# Patient Record
Sex: MALE | Race: WHITE | Employment: OTHER | URBAN - METROPOLITAN AREA
[De-identification: names, ages, dates, MRNs, and addresses within clinical notes are randomized per-mention and may not be internally consistent; named-entity substitution may affect disease eponyms.]

---

## 2017-01-10 PROBLEM — R73.03 PREDIABETES: Status: ACTIVE | Noted: 2017-01-10

## 2018-06-05 ENCOUNTER — OFFICE VISIT (OUTPATIENT)
Dept: FAMILY MEDICINE CLINIC | Age: 63
End: 2018-06-05
Payer: COMMERCIAL

## 2018-06-05 VITALS
WEIGHT: 223 LBS | DIASTOLIC BLOOD PRESSURE: 82 MMHG | SYSTOLIC BLOOD PRESSURE: 138 MMHG | OXYGEN SATURATION: 95 % | BODY MASS INDEX: 28.62 KG/M2 | TEMPERATURE: 98.5 F | HEART RATE: 105 BPM | HEIGHT: 74 IN | RESPIRATION RATE: 18 BRPM

## 2018-06-05 DIAGNOSIS — J32.9 SINOBRONCHITIS: Primary | ICD-10-CM

## 2018-06-05 DIAGNOSIS — J40 SINOBRONCHITIS: Primary | ICD-10-CM

## 2018-06-05 PROCEDURE — 99213 OFFICE O/P EST LOW 20 MIN: CPT | Performed by: PHYSICIAN ASSISTANT

## 2018-06-05 RX ORDER — LEVOFLOXACIN 500 MG/1
500 TABLET, FILM COATED ORAL DAILY
Qty: 10 TABLET | Refills: 0 | Status: SHIPPED | OUTPATIENT
Start: 2018-06-05 | End: 2018-06-15

## 2018-06-08 ENCOUNTER — HOSPITAL ENCOUNTER (EMERGENCY)
Age: 63
Discharge: HOME | End: 2018-06-08
Payer: COMMERCIAL

## 2018-06-08 ENCOUNTER — APPOINTMENT (OUTPATIENT)
Dept: ULTRASOUND IMAGING | Age: 63
End: 2018-06-08
Payer: COMMERCIAL

## 2018-06-08 VITALS
WEIGHT: 223 LBS | SYSTOLIC BLOOD PRESSURE: 112 MMHG | HEART RATE: 101 BPM | TEMPERATURE: 97.4 F | OXYGEN SATURATION: 97 % | DIASTOLIC BLOOD PRESSURE: 78 MMHG | RESPIRATION RATE: 16 BRPM | BODY MASS INDEX: 28.63 KG/M2

## 2018-06-08 DIAGNOSIS — I80.9 PHLEBITIS: ICD-10-CM

## 2018-06-08 DIAGNOSIS — I83.92 VARICOSE VEINS OF LEFT LOWER EXTREMITY: Primary | ICD-10-CM

## 2018-06-08 LAB
ANION GAP SERPL CALCULATED.3IONS-SCNC: 13 MMOL/L (ref 7–16)
BASOPHILS ABSOLUTE: 0.14 E9/L (ref 0–0.2)
BASOPHILS RELATIVE PERCENT: 1.2 % (ref 0–2)
BUN BLDV-MCNC: 22 MG/DL (ref 8–23)
CALCIUM SERPL-MCNC: 10.3 MG/DL (ref 8.6–10.2)
CHLORIDE BLD-SCNC: 104 MMOL/L (ref 98–107)
CO2: 26 MMOL/L (ref 22–29)
CREAT SERPL-MCNC: 0.9 MG/DL (ref 0.7–1.2)
EOSINOPHILS ABSOLUTE: 0.4 E9/L (ref 0.05–0.5)
EOSINOPHILS RELATIVE PERCENT: 3.6 % (ref 0–6)
GFR AFRICAN AMERICAN: >60
GFR NON-AFRICAN AMERICAN: >60 ML/MIN/1.73
GLUCOSE BLD-MCNC: 104 MG/DL (ref 74–109)
HCT VFR BLD CALC: 45.3 % (ref 37–54)
HEMOGLOBIN: 15.1 G/DL (ref 12.5–16.5)
IMMATURE GRANULOCYTES #: 0.09 E9/L
IMMATURE GRANULOCYTES %: 0.8 % (ref 0–5)
LYMPHOCYTES ABSOLUTE: 1.18 E9/L (ref 1.5–4)
LYMPHOCYTES RELATIVE PERCENT: 10.5 % (ref 20–42)
MCH RBC QN AUTO: 30.8 PG (ref 26–35)
MCHC RBC AUTO-ENTMCNC: 33.3 % (ref 32–34.5)
MCV RBC AUTO: 92.3 FL (ref 80–99.9)
MONOCYTES ABSOLUTE: 1.13 E9/L (ref 0.1–0.95)
MONOCYTES RELATIVE PERCENT: 10.1 % (ref 2–12)
NEUTROPHILS ABSOLUTE: 8.28 E9/L (ref 1.8–7.3)
NEUTROPHILS RELATIVE PERCENT: 73.8 % (ref 43–80)
PDW BLD-RTO: 12.2 FL (ref 11.5–15)
PLATELET # BLD: 624 E9/L (ref 130–450)
PMV BLD AUTO: 10 FL (ref 7–12)
POTASSIUM SERPL-SCNC: 4.3 MMOL/L (ref 3.5–5)
RBC # BLD: 4.91 E12/L (ref 3.8–5.8)
SODIUM BLD-SCNC: 143 MMOL/L (ref 132–146)
WBC # BLD: 11.2 E9/L (ref 4.5–11.5)

## 2018-06-08 PROCEDURE — 36415 COLL VENOUS BLD VENIPUNCTURE: CPT

## 2018-06-08 PROCEDURE — 80048 BASIC METABOLIC PNL TOTAL CA: CPT

## 2018-06-08 PROCEDURE — 93971 EXTREMITY STUDY: CPT

## 2018-06-08 PROCEDURE — 85025 COMPLETE CBC W/AUTO DIFF WBC: CPT

## 2018-06-08 PROCEDURE — 99212 OFFICE O/P EST SF 10 MIN: CPT

## 2018-08-24 ENCOUNTER — HOSPITAL ENCOUNTER (OUTPATIENT)
Dept: ULTRASOUND IMAGING | Age: 63
Discharge: HOME | End: 2018-08-26
Payer: COMMERCIAL

## 2018-08-24 DIAGNOSIS — M79.89 LEFT LEG SWELLING: ICD-10-CM

## 2018-08-24 PROCEDURE — 93971 EXTREMITY STUDY: CPT

## 2020-01-01 ENCOUNTER — APPOINTMENT (OUTPATIENT)
Dept: GENERAL RADIOLOGY | Age: 65
End: 2020-01-01
Payer: COMMERCIAL

## 2020-01-01 ENCOUNTER — APPOINTMENT (OUTPATIENT)
Dept: CT IMAGING | Age: 65
DRG: 023 | End: 2020-01-01
Payer: COMMERCIAL

## 2020-01-01 ENCOUNTER — HOSPITAL ENCOUNTER (INPATIENT)
Age: 65
LOS: 7 days | Discharge: HOSPICE | DRG: 871 | End: 2020-12-29
Attending: EMERGENCY MEDICINE | Admitting: INTERNAL MEDICINE
Payer: COMMERCIAL

## 2020-01-01 ENCOUNTER — HOSPITAL ENCOUNTER (EMERGENCY)
Age: 65
Discharge: HOME | End: 2020-01-02
Payer: COMMERCIAL

## 2020-01-01 ENCOUNTER — APPOINTMENT (OUTPATIENT)
Dept: GENERAL RADIOLOGY | Age: 65
DRG: 023 | End: 2020-01-01
Payer: COMMERCIAL

## 2020-01-01 ENCOUNTER — ANESTHESIA EVENT (OUTPATIENT)
Dept: INTERVENTIONAL RADIOLOGY/VASCULAR | Age: 65
DRG: 023 | End: 2020-01-01
Payer: COMMERCIAL

## 2020-01-01 ENCOUNTER — ANESTHESIA (OUTPATIENT)
Dept: INTERVENTIONAL RADIOLOGY/VASCULAR | Age: 65
DRG: 023 | End: 2020-01-01
Payer: COMMERCIAL

## 2020-01-01 ENCOUNTER — APPOINTMENT (OUTPATIENT)
Dept: CT IMAGING | Age: 65
End: 2020-01-01
Payer: COMMERCIAL

## 2020-01-01 ENCOUNTER — APPOINTMENT (OUTPATIENT)
Dept: CT IMAGING | Age: 65
DRG: 871 | End: 2020-01-01
Payer: COMMERCIAL

## 2020-01-01 ENCOUNTER — HOSPITAL ENCOUNTER (OUTPATIENT)
Age: 65
Setting detail: OBSERVATION
Discharge: OTHER INSTITUTION | End: 2020-12-03
Attending: EMERGENCY MEDICINE | Admitting: INTERNAL MEDICINE
Payer: COMMERCIAL

## 2020-01-01 ENCOUNTER — APPOINTMENT (OUTPATIENT)
Dept: INTERVENTIONAL RADIOLOGY/VASCULAR | Age: 65
DRG: 023 | End: 2020-01-01
Payer: COMMERCIAL

## 2020-01-01 ENCOUNTER — APPOINTMENT (OUTPATIENT)
Dept: MRI IMAGING | Age: 65
DRG: 023 | End: 2020-01-01
Payer: COMMERCIAL

## 2020-01-01 ENCOUNTER — HOSPITAL ENCOUNTER (EMERGENCY)
Age: 65
Discharge: NURSING, REHAB OR LONG-TERM CARE | End: 2020-11-27
Attending: EMERGENCY MEDICINE
Payer: COMMERCIAL

## 2020-01-01 ENCOUNTER — APPOINTMENT (OUTPATIENT)
Dept: GENERAL RADIOLOGY | Age: 65
DRG: 871 | End: 2020-01-01
Payer: COMMERCIAL

## 2020-01-01 ENCOUNTER — HOSPITAL ENCOUNTER (INPATIENT)
Age: 65
LOS: 11 days | Discharge: ANOTHER HOSPITAL | DRG: 023 | End: 2020-11-03
Attending: EMERGENCY MEDICINE | Admitting: INTERNAL MEDICINE
Payer: COMMERCIAL

## 2020-01-01 ENCOUNTER — HOSPITAL ENCOUNTER (OUTPATIENT)
Age: 65
Discharge: HOME | End: 2020-06-04
Payer: COMMERCIAL

## 2020-01-01 VITALS
BODY MASS INDEX: 29.15 KG/M2 | RESPIRATION RATE: 18 BRPM | WEIGHT: 227 LBS | OXYGEN SATURATION: 94 % | DIASTOLIC BLOOD PRESSURE: 86 MMHG | HEART RATE: 95 BPM | TEMPERATURE: 98.3 F | SYSTOLIC BLOOD PRESSURE: 151 MMHG

## 2020-01-01 VITALS
WEIGHT: 184.75 LBS | TEMPERATURE: 97.9 F | HEART RATE: 102 BPM | OXYGEN SATURATION: 97 % | SYSTOLIC BLOOD PRESSURE: 125 MMHG | DIASTOLIC BLOOD PRESSURE: 83 MMHG | BODY MASS INDEX: 23.71 KG/M2 | RESPIRATION RATE: 17 BRPM | HEIGHT: 74 IN

## 2020-01-01 VITALS
HEIGHT: 74 IN | TEMPERATURE: 98 F | WEIGHT: 185 LBS | RESPIRATION RATE: 41 BRPM | BODY MASS INDEX: 23.74 KG/M2 | OXYGEN SATURATION: 93 % | SYSTOLIC BLOOD PRESSURE: 74 MMHG | HEART RATE: 129 BPM | DIASTOLIC BLOOD PRESSURE: 45 MMHG

## 2020-01-01 VITALS
SYSTOLIC BLOOD PRESSURE: 110 MMHG | HEART RATE: 85 BPM | HEIGHT: 74 IN | TEMPERATURE: 97.7 F | DIASTOLIC BLOOD PRESSURE: 80 MMHG | BODY MASS INDEX: 26.56 KG/M2 | OXYGEN SATURATION: 96 % | RESPIRATION RATE: 16 BRPM | WEIGHT: 207 LBS

## 2020-01-01 VITALS
SYSTOLIC BLOOD PRESSURE: 128 MMHG | BODY MASS INDEX: 26.56 KG/M2 | WEIGHT: 207 LBS | OXYGEN SATURATION: 95 % | RESPIRATION RATE: 16 BRPM | TEMPERATURE: 97.8 F | HEIGHT: 74 IN | DIASTOLIC BLOOD PRESSURE: 81 MMHG | HEART RATE: 88 BPM

## 2020-01-01 VITALS — SYSTOLIC BLOOD PRESSURE: 170 MMHG | DIASTOLIC BLOOD PRESSURE: 95 MMHG | OXYGEN SATURATION: 100 %

## 2020-01-01 DIAGNOSIS — I63.9 ACUTE ISCHEMIC STROKE (HCC): Primary | ICD-10-CM

## 2020-01-01 LAB
AADO2: 576.1 MMHG
ADENOVIRUS BY PCR: NOT DETECTED
ADENOVIRUS BY PCR: NOT DETECTED
ALBUMIN SERPL-MCNC: 2.4 G/DL (ref 3.5–5.2)
ALBUMIN SERPL-MCNC: 2.4 G/DL (ref 3.5–5.2)
ALBUMIN SERPL-MCNC: 2.6 G/DL (ref 3.5–5.2)
ALBUMIN SERPL-MCNC: 2.8 G/DL (ref 3.5–5.2)
ALBUMIN SERPL-MCNC: 2.9 G/DL (ref 3.5–5.2)
ALBUMIN SERPL-MCNC: 3 G/DL (ref 3.5–5.2)
ALBUMIN SERPL-MCNC: 4.3 G/DL (ref 3.5–5.2)
ALBUMIN SERPL-MCNC: 4.6 G/DL (ref 3.5–5.2)
ALP BLD-CCNC: 218 U/L (ref 40–129)
ALP BLD-CCNC: 232 U/L (ref 40–129)
ALP BLD-CCNC: 244 U/L (ref 40–129)
ALP BLD-CCNC: 290 U/L (ref 40–129)
ALP BLD-CCNC: 332 U/L (ref 40–129)
ALP BLD-CCNC: 340 U/L (ref 40–129)
ALP BLD-CCNC: 76 U/L (ref 40–129)
ALP BLD-CCNC: 79 U/L (ref 40–129)
ALT SERPL-CCNC: 102 U/L (ref 0–40)
ALT SERPL-CCNC: 162 U/L (ref 0–40)
ALT SERPL-CCNC: 22 U/L (ref 0–40)
ALT SERPL-CCNC: 33 U/L (ref 0–40)
ALT SERPL-CCNC: 65 U/L (ref 0–40)
ALT SERPL-CCNC: 70 U/L (ref 0–40)
ALT SERPL-CCNC: 94 U/L (ref 0–40)
ALT SERPL-CCNC: 95 U/L (ref 0–40)
AMORPHOUS: ABNORMAL
ANION GAP SERPL CALCULATED.3IONS-SCNC: 11 MMOL/L (ref 7–16)
ANION GAP SERPL CALCULATED.3IONS-SCNC: 11 MMOL/L (ref 7–16)
ANION GAP SERPL CALCULATED.3IONS-SCNC: 12 MMOL/L (ref 7–16)
ANION GAP SERPL CALCULATED.3IONS-SCNC: 13 MMOL/L (ref 7–16)
ANION GAP SERPL CALCULATED.3IONS-SCNC: 14 MMOL/L (ref 7–16)
ANION GAP SERPL CALCULATED.3IONS-SCNC: 15 MMOL/L (ref 7–16)
ANION GAP SERPL CALCULATED.3IONS-SCNC: 15 MMOL/L (ref 7–16)
ANION GAP SERPL CALCULATED.3IONS-SCNC: 16 MMOL/L (ref 7–16)
ANION GAP SERPL CALCULATED.3IONS-SCNC: 17 MMOL/L (ref 7–16)
ANION GAP SERPL CALCULATED.3IONS-SCNC: 17 MMOL/L (ref 7–16)
ANION GAP SERPL CALCULATED.3IONS-SCNC: 18 MMOL/L (ref 7–16)
ANION GAP SERPL CALCULATED.3IONS-SCNC: 18 MMOL/L (ref 7–16)
ANION GAP SERPL CALCULATED.3IONS-SCNC: 23 MMOL/L (ref 7–16)
ANION GAP SERPL CALCULATED.3IONS-SCNC: 9 MMOL/L (ref 7–16)
ANISOCYTOSIS: ABNORMAL
APTT: 26.3 SEC (ref 24.5–35.1)
APTT: 28.2 SEC (ref 24.5–35.1)
AST SERPL-CCNC: 26 U/L (ref 0–39)
AST SERPL-CCNC: 29 U/L (ref 0–39)
AST SERPL-CCNC: 38 U/L (ref 0–39)
AST SERPL-CCNC: 43 U/L (ref 0–39)
AST SERPL-CCNC: 46 U/L (ref 0–39)
AST SERPL-CCNC: 51 U/L (ref 0–39)
AST SERPL-CCNC: 67 U/L (ref 0–39)
AST SERPL-CCNC: 81 U/L (ref 0–39)
B.E.: -3.3 MMOL/L (ref -3–3)
B.E.: 0.2 MMOL/L (ref -3–3)
BACTERIA: ABNORMAL /HPF
BASOPHILS ABSOLUTE: 0 E9/L (ref 0–0.2)
BASOPHILS ABSOLUTE: 0.05 E9/L (ref 0–0.2)
BASOPHILS ABSOLUTE: 0.08 E9/L (ref 0–0.2)
BASOPHILS ABSOLUTE: 0.09 E9/L (ref 0–0.2)
BASOPHILS ABSOLUTE: 0.09 E9/L (ref 0–0.2)
BASOPHILS ABSOLUTE: 0.15 E9/L (ref 0–0.2)
BASOPHILS RELATIVE PERCENT: 0.1 % (ref 0–2)
BASOPHILS RELATIVE PERCENT: 0.2 % (ref 0–2)
BASOPHILS RELATIVE PERCENT: 0.2 % (ref 0–2)
BASOPHILS RELATIVE PERCENT: 0.3 % (ref 0–2)
BASOPHILS RELATIVE PERCENT: 0.4 % (ref 0–2)
BASOPHILS RELATIVE PERCENT: 0.9 % (ref 0–2)
BILIRUB SERPL-MCNC: 0.5 MG/DL (ref 0–1.2)
BILIRUB SERPL-MCNC: 0.7 MG/DL (ref 0–1.2)
BILIRUB SERPL-MCNC: 0.8 MG/DL (ref 0–1.2)
BILIRUB SERPL-MCNC: 0.8 MG/DL (ref 0–1.2)
BILIRUB SERPL-MCNC: 1 MG/DL (ref 0–1.2)
BILIRUB SERPL-MCNC: 1.8 MG/DL (ref 0–1.2)
BILIRUBIN URINE: ABNORMAL
BILIRUBIN URINE: NEGATIVE
BLOOD CULTURE, ROUTINE: NORMAL
BLOOD CULTURE, ROUTINE: NORMAL
BLOOD, URINE: ABNORMAL
BLOOD, URINE: ABNORMAL
BLOOD, URINE: NEGATIVE
BLOOD, URINE: NEGATIVE
BORDETELLA PARAPERTUSSIS BY PCR: NOT DETECTED
BORDETELLA PARAPERTUSSIS BY PCR: NOT DETECTED
BORDETELLA PERTUSSIS BY PCR: NOT DETECTED
BORDETELLA PERTUSSIS BY PCR: NOT DETECTED
BUN BLDV-MCNC: 112 MG/DL (ref 8–23)
BUN BLDV-MCNC: 13 MG/DL (ref 8–23)
BUN BLDV-MCNC: 13 MG/DL (ref 8–23)
BUN BLDV-MCNC: 14 MG/DL (ref 8–23)
BUN BLDV-MCNC: 14 MG/DL (ref 8–23)
BUN BLDV-MCNC: 141 MG/DL (ref 8–23)
BUN BLDV-MCNC: 143 MG/DL (ref 8–23)
BUN BLDV-MCNC: 148 MG/DL (ref 8–23)
BUN BLDV-MCNC: 149 MG/DL (ref 8–23)
BUN BLDV-MCNC: 157 MG/DL (ref 8–23)
BUN BLDV-MCNC: 16 MG/DL (ref 8–23)
BUN BLDV-MCNC: 162 MG/DL (ref 8–23)
BUN BLDV-MCNC: 164 MG/DL (ref 8–23)
BUN BLDV-MCNC: 17 MG/DL (ref 8–23)
BUN BLDV-MCNC: 170 MG/DL (ref 8–23)
BUN BLDV-MCNC: 18 MG/DL (ref 8–23)
BUN BLDV-MCNC: 18 MG/DL (ref 8–23)
BUN BLDV-MCNC: 20 MG/DL (ref 8–23)
BUN BLDV-MCNC: 32 MG/DL (ref 8–23)
BUN BLDV-MCNC: 33 MG/DL (ref 8–23)
BUN BLDV-MCNC: 37 MG/DL (ref 8–23)
BUN BLDV-MCNC: 45 MG/DL (ref 8–23)
BUN BLDV-MCNC: 51 MG/DL (ref 8–23)
BUN BLDV-MCNC: 80 MG/DL (ref 8–23)
BUN BLDV-MCNC: 88 MG/DL (ref 8–23)
BURR CELLS: ABNORMAL
C-REACTIVE PROTEIN: 11.7 MG/DL (ref 0–0.4)
CALCIUM SERPL-MCNC: 10.3 MG/DL (ref 8.6–10.2)
CALCIUM SERPL-MCNC: 8.3 MG/DL (ref 8.6–10.2)
CALCIUM SERPL-MCNC: 8.6 MG/DL (ref 8.6–10.2)
CALCIUM SERPL-MCNC: 8.7 MG/DL (ref 8.6–10.2)
CALCIUM SERPL-MCNC: 8.8 MG/DL (ref 8.6–10.2)
CALCIUM SERPL-MCNC: 8.9 MG/DL (ref 8.6–10.2)
CALCIUM SERPL-MCNC: 8.9 MG/DL (ref 8.6–10.2)
CALCIUM SERPL-MCNC: 9 MG/DL (ref 8.6–10.2)
CALCIUM SERPL-MCNC: 9 MG/DL (ref 8.6–10.2)
CALCIUM SERPL-MCNC: 9.1 MG/DL (ref 8.6–10.2)
CALCIUM SERPL-MCNC: 9.1 MG/DL (ref 8.6–10.2)
CALCIUM SERPL-MCNC: 9.2 MG/DL (ref 8.6–10.2)
CALCIUM SERPL-MCNC: 9.4 MG/DL (ref 8.6–10.2)
CALCIUM SERPL-MCNC: 9.4 MG/DL (ref 8.6–10.2)
CALCIUM SERPL-MCNC: 9.5 MG/DL (ref 8.6–10.2)
CALCIUM SERPL-MCNC: 9.6 MG/DL (ref 8.6–10.2)
CALCIUM SERPL-MCNC: 9.6 MG/DL (ref 8.6–10.2)
CALCIUM SERPL-MCNC: 9.7 MG/DL (ref 8.6–10.2)
CALCIUM SERPL-MCNC: 9.8 MG/DL (ref 8.6–10.2)
CALCIUM SERPL-MCNC: 9.9 MG/DL (ref 8.6–10.2)
CALCIUM SERPL-MCNC: 9.9 MG/DL (ref 8.6–10.2)
CHLAMYDOPHILIA PNEUMONIAE BY PCR: NOT DETECTED
CHLAMYDOPHILIA PNEUMONIAE BY PCR: NOT DETECTED
CHLORIDE BLD-SCNC: 103 MMOL/L (ref 98–107)
CHLORIDE BLD-SCNC: 104 MMOL/L (ref 98–107)
CHLORIDE BLD-SCNC: 105 MMOL/L (ref 98–107)
CHLORIDE BLD-SCNC: 110 MMOL/L (ref 98–107)
CHLORIDE BLD-SCNC: 112 MMOL/L (ref 98–107)
CHLORIDE BLD-SCNC: 113 MMOL/L (ref 98–107)
CHLORIDE BLD-SCNC: 114 MMOL/L (ref 98–107)
CHLORIDE BLD-SCNC: 115 MMOL/L (ref 98–107)
CHLORIDE BLD-SCNC: 116 MMOL/L (ref 98–107)
CHLORIDE BLD-SCNC: 116 MMOL/L (ref 98–107)
CHLORIDE BLD-SCNC: 117 MMOL/L (ref 98–107)
CHLORIDE BLD-SCNC: 117 MMOL/L (ref 98–107)
CHLORIDE BLD-SCNC: 118 MMOL/L (ref 98–107)
CHLORIDE BLD-SCNC: 118 MMOL/L (ref 98–107)
CHLORIDE BLD-SCNC: 119 MMOL/L (ref 98–107)
CHLORIDE BLD-SCNC: 97 MMOL/L (ref 98–107)
CHLORIDE BLD-SCNC: 99 MMOL/L (ref 98–107)
CHLORIDE URINE RANDOM: <20 MMOL/L
CHOLESTEROL, TOTAL: 159 MG/DL (ref 0–199)
CHOLESTEROL, TOTAL: 253 MG/DL (ref 0–199)
CHP ED QC CHECK: YES
CLARITY: CLEAR
CO2: 20 MMOL/L (ref 22–29)
CO2: 21 MMOL/L (ref 22–29)
CO2: 21 MMOL/L (ref 22–29)
CO2: 22 MMOL/L (ref 22–29)
CO2: 23 MMOL/L (ref 22–29)
CO2: 23 MMOL/L (ref 22–29)
CO2: 24 MMOL/L (ref 22–29)
CO2: 25 MMOL/L (ref 22–29)
CO2: 26 MMOL/L (ref 22–29)
CO2: 26 MMOL/L (ref 22–29)
CO2: 27 MMOL/L (ref 22–29)
CO2: 27 MMOL/L (ref 22–29)
CO2: 28 MMOL/L (ref 22–29)
COHB: 0.1 % (ref 0–1.5)
COHB: 0.3 % (ref 0–1.5)
COLOR: ABNORMAL
COLOR: YELLOW
CORONAVIRUS 229E BY PCR: NOT DETECTED
CORONAVIRUS 229E BY PCR: NOT DETECTED
CORONAVIRUS HKU1 BY PCR: NOT DETECTED
CORONAVIRUS HKU1 BY PCR: NOT DETECTED
CORONAVIRUS NL63 BY PCR: NOT DETECTED
CORONAVIRUS NL63 BY PCR: NOT DETECTED
CORONAVIRUS OC43 BY PCR: NOT DETECTED
CORONAVIRUS OC43 BY PCR: NOT DETECTED
CREAT SERPL-MCNC: 0.6 MG/DL (ref 0.7–1.2)
CREAT SERPL-MCNC: 0.7 MG/DL (ref 0.7–1.2)
CREAT SERPL-MCNC: 0.8 MG/DL (ref 0.7–1.2)
CREAT SERPL-MCNC: 0.9 MG/DL (ref 0.7–1.2)
CREAT SERPL-MCNC: 1.3 MG/DL (ref 0.7–1.2)
CREAT SERPL-MCNC: 1.3 MG/DL (ref 0.7–1.2)
CREAT SERPL-MCNC: 1.4 MG/DL (ref 0.7–1.2)
CREAT SERPL-MCNC: 2.4 MG/DL (ref 0.7–1.2)
CREAT SERPL-MCNC: 3.5 MG/DL (ref 0.7–1.2)
CREAT SERPL-MCNC: 4.1 MG/DL (ref 0.7–1.2)
CREAT SERPL-MCNC: 4.9 MG/DL (ref 0.7–1.2)
CREAT SERPL-MCNC: 4.9 MG/DL (ref 0.7–1.2)
CREAT SERPL-MCNC: 5.3 MG/DL (ref 0.7–1.2)
CREAT SERPL-MCNC: 5.5 MG/DL (ref 0.7–1.2)
CREAT SERPL-MCNC: 5.5 MG/DL (ref 0.7–1.2)
CREAT SERPL-MCNC: 6 MG/DL (ref 0.7–1.2)
CREATININE URINE: 194 MG/DL (ref 40–278)
CRITICAL: ABNORMAL
CRITICAL: ABNORMAL
CRYSTALS, UA: ABNORMAL /HPF
CRYSTALS, UA: ABNORMAL /HPF
CULTURE, BLOOD 2: NORMAL
CULTURE, BLOOD 2: NORMAL
D DIMER: 1557 NG/ML DDU
D DIMER: 2177 NG/ML DDU
D DIMER: 2408 NG/ML DDU
D DIMER: 2552 NG/ML DDU
D DIMER: 3330 NG/ML DDU
D DIMER: 3483 NG/ML DDU
D DIMER: 4055 NG/ML DDU
D DIMER: ABNORMAL NG/ML DDU
DATE ANALYZED: ABNORMAL
DATE ANALYZED: ABNORMAL
DATE OF COLLECTION: ABNORMAL
DATE OF COLLECTION: ABNORMAL
EKG ATRIAL RATE: 119 BPM
EKG ATRIAL RATE: 137 BPM
EKG ATRIAL RATE: 86 BPM
EKG P AXIS: 65 DEGREES
EKG P AXIS: 78 DEGREES
EKG P AXIS: 87 DEGREES
EKG P-R INTERVAL: 138 MS
EKG P-R INTERVAL: 166 MS
EKG P-R INTERVAL: 200 MS
EKG Q-T INTERVAL: 270 MS
EKG Q-T INTERVAL: 340 MS
EKG Q-T INTERVAL: 396 MS
EKG QRS DURATION: 76 MS
EKG QRS DURATION: 78 MS
EKG QRS DURATION: 94 MS
EKG QTC CALCULATION (BAZETT): 407 MS
EKG QTC CALCULATION (BAZETT): 473 MS
EKG QTC CALCULATION (BAZETT): 478 MS
EKG R AXIS: 58 DEGREES
EKG R AXIS: 94 DEGREES
EKG R AXIS: 98 DEGREES
EKG T AXIS: 62 DEGREES
EKG T AXIS: 63 DEGREES
EKG T AXIS: 80 DEGREES
EKG VENTRICULAR RATE: 119 BPM
EKG VENTRICULAR RATE: 137 BPM
EKG VENTRICULAR RATE: 86 BPM
EOSINOPHILS ABSOLUTE: 0 E9/L (ref 0.05–0.5)
EOSINOPHILS ABSOLUTE: 0.01 E9/L (ref 0.05–0.5)
EOSINOPHILS ABSOLUTE: 0.11 E9/L (ref 0.05–0.5)
EOSINOPHILS ABSOLUTE: 0.21 E9/L (ref 0.05–0.5)
EOSINOPHILS ABSOLUTE: 0.3 E9/L (ref 0.05–0.5)
EOSINOPHILS ABSOLUTE: 0.33 E9/L (ref 0.05–0.5)
EOSINOPHILS RELATIVE PERCENT: 0 % (ref 0–6)
EOSINOPHILS RELATIVE PERCENT: 0 % (ref 0–6)
EOSINOPHILS RELATIVE PERCENT: 0.2 % (ref 0–6)
EOSINOPHILS RELATIVE PERCENT: 0.3 % (ref 0–6)
EOSINOPHILS RELATIVE PERCENT: 0.5 % (ref 0–6)
EOSINOPHILS RELATIVE PERCENT: 0.9 % (ref 0–6)
EOSINOPHILS RELATIVE PERCENT: 1.4 % (ref 0–6)
EOSINOPHILS RELATIVE PERCENT: 3.3 % (ref 0–6)
EPITHELIAL CELLS, UA: ABNORMAL /HPF
FERRITIN: 1489 NG/ML
FIBRINOGEN: >700 MG/DL (ref 225–540)
FIBRINOGEN: ABNORMAL MG/DL (ref 225–540)
FIO2: 100 %
GFR AFRICAN AMERICAN: 11
GFR AFRICAN AMERICAN: 13
GFR AFRICAN AMERICAN: 15
GFR AFRICAN AMERICAN: 15
GFR AFRICAN AMERICAN: 18
GFR AFRICAN AMERICAN: 21
GFR AFRICAN AMERICAN: 33
GFR AFRICAN AMERICAN: >60
GFR NON-AFRICAN AMERICAN: 10 ML/MIN/1.73
GFR NON-AFRICAN AMERICAN: 11 ML/MIN/1.73
GFR NON-AFRICAN AMERICAN: 12 ML/MIN/1.73
GFR NON-AFRICAN AMERICAN: 12 ML/MIN/1.73
GFR NON-AFRICAN AMERICAN: 15 ML/MIN/1.73
GFR NON-AFRICAN AMERICAN: 18 ML/MIN/1.73
GFR NON-AFRICAN AMERICAN: 27 ML/MIN/1.73
GFR NON-AFRICAN AMERICAN: 51 ML/MIN/1.73
GFR NON-AFRICAN AMERICAN: 56 ML/MIN/1.73
GFR NON-AFRICAN AMERICAN: 56 ML/MIN/1.73
GFR NON-AFRICAN AMERICAN: >60 ML/MIN/1.73
GLUCOSE BLD-MCNC: 105 MG/DL (ref 74–99)
GLUCOSE BLD-MCNC: 105 MG/DL (ref 74–99)
GLUCOSE BLD-MCNC: 106 MG/DL (ref 74–99)
GLUCOSE BLD-MCNC: 109 MG/DL (ref 74–99)
GLUCOSE BLD-MCNC: 110 MG/DL (ref 74–99)
GLUCOSE BLD-MCNC: 112 MG/DL (ref 74–99)
GLUCOSE BLD-MCNC: 114 MG/DL (ref 74–99)
GLUCOSE BLD-MCNC: 119 MG/DL (ref 74–99)
GLUCOSE BLD-MCNC: 121 MG/DL
GLUCOSE BLD-MCNC: 129 MG/DL (ref 74–99)
GLUCOSE BLD-MCNC: 131 MG/DL (ref 74–99)
GLUCOSE BLD-MCNC: 136 MG/DL (ref 74–99)
GLUCOSE BLD-MCNC: 138 MG/DL (ref 74–99)
GLUCOSE BLD-MCNC: 138 MG/DL (ref 74–99)
GLUCOSE BLD-MCNC: 140 MG/DL (ref 74–99)
GLUCOSE BLD-MCNC: 141 MG/DL (ref 74–99)
GLUCOSE BLD-MCNC: 141 MG/DL (ref 74–99)
GLUCOSE BLD-MCNC: 142 MG/DL (ref 74–99)
GLUCOSE BLD-MCNC: 143 MG/DL (ref 74–99)
GLUCOSE BLD-MCNC: 154 MG/DL (ref 74–99)
GLUCOSE BLD-MCNC: 174 MG/DL (ref 74–99)
GLUCOSE BLD-MCNC: 243 MG/DL (ref 74–99)
GLUCOSE BLD-MCNC: 84 MG/DL (ref 74–99)
GLUCOSE BLD-MCNC: 86 MG/DL (ref 74–99)
GLUCOSE BLD-MCNC: 95 MG/DL (ref 74–99)
GLUCOSE URINE: 100 MG/DL
GLUCOSE URINE: 100 MG/DL
GLUCOSE URINE: NEGATIVE MG/DL
GLUCOSE URINE: NEGATIVE MG/DL
HBA1C MFR BLD: 5.3 % (ref 4–5.6)
HBA1C MFR BLD: 5.8 % (ref 4–5.6)
HCO3: 20.4 MMOL/L (ref 22–26)
HCO3: 22.4 MMOL/L (ref 22–26)
HCT VFR BLD CALC: 38.5 % (ref 37–54)
HCT VFR BLD CALC: 39.6 % (ref 37–54)
HCT VFR BLD CALC: 39.6 % (ref 37–54)
HCT VFR BLD CALC: 39.7 % (ref 37–54)
HCT VFR BLD CALC: 41.9 % (ref 37–54)
HCT VFR BLD CALC: 42.7 % (ref 37–54)
HCT VFR BLD CALC: 42.9 % (ref 37–54)
HCT VFR BLD CALC: 43.1 % (ref 37–54)
HCT VFR BLD CALC: 43.4 % (ref 37–54)
HCT VFR BLD CALC: 43.5 % (ref 37–54)
HCT VFR BLD CALC: 44.1 % (ref 37–54)
HCT VFR BLD CALC: 44.6 % (ref 37–54)
HCT VFR BLD CALC: 44.9 % (ref 37–54)
HCT VFR BLD CALC: 46.3 % (ref 37–54)
HCT VFR BLD CALC: 46.4 % (ref 37–54)
HCT VFR BLD CALC: 48.7 % (ref 37–54)
HCT VFR BLD CALC: 51.4 % (ref 37–54)
HDLC SERPL-MCNC: 47 MG/DL
HDLC SERPL-MCNC: 48 MG/DL
HEMOGLOBIN: 12.6 G/DL (ref 12.5–16.5)
HEMOGLOBIN: 12.8 G/DL (ref 12.5–16.5)
HEMOGLOBIN: 12.9 G/DL (ref 12.5–16.5)
HEMOGLOBIN: 13.2 G/DL (ref 12.5–16.5)
HEMOGLOBIN: 13.2 G/DL (ref 12.5–16.5)
HEMOGLOBIN: 13.4 G/DL (ref 12.5–16.5)
HEMOGLOBIN: 13.5 G/DL (ref 12.5–16.5)
HEMOGLOBIN: 13.6 G/DL (ref 12.5–16.5)
HEMOGLOBIN: 14 G/DL (ref 12.5–16.5)
HEMOGLOBIN: 14.1 G/DL (ref 12.5–16.5)
HEMOGLOBIN: 14.3 G/DL (ref 12.5–16.5)
HEMOGLOBIN: 14.7 G/DL (ref 12.5–16.5)
HEMOGLOBIN: 14.7 G/DL (ref 12.5–16.5)
HEMOGLOBIN: 15.2 G/DL (ref 12.5–16.5)
HEMOGLOBIN: 16.4 G/DL (ref 12.5–16.5)
HHB: 4.7 % (ref 0–5)
HHB: 8.3 % (ref 0–5)
HUMAN METAPNEUMOVIRUS BY PCR: NOT DETECTED
HUMAN METAPNEUMOVIRUS BY PCR: NOT DETECTED
HUMAN RHINOVIRUS/ENTEROVIRUS BY PCR: NOT DETECTED
HUMAN RHINOVIRUS/ENTEROVIRUS BY PCR: NOT DETECTED
HYALINE CASTS: ABNORMAL /LPF (ref 0–2)
IMMATURE GRANULOCYTES #: 0.03 E9/L
IMMATURE GRANULOCYTES #: 0.11 E9/L
IMMATURE GRANULOCYTES #: 0.2 E9/L
IMMATURE GRANULOCYTES #: 0.34 E9/L
IMMATURE GRANULOCYTES #: 0.46 E9/L
IMMATURE GRANULOCYTES %: 0.3 % (ref 0–5)
IMMATURE GRANULOCYTES %: 0.5 % (ref 0–5)
IMMATURE GRANULOCYTES %: 0.8 % (ref 0–5)
IMMATURE GRANULOCYTES %: 1.1 % (ref 0–5)
IMMATURE GRANULOCYTES %: 1.4 % (ref 0–5)
INFLUENZA A BY PCR: NOT DETECTED
INFLUENZA A BY PCR: NOT DETECTED
INFLUENZA B BY PCR: NOT DETECTED
INFLUENZA B BY PCR: NOT DETECTED
INR BLD: 1
INR BLD: 1.3
KETONES, URINE: 40 MG/DL
KETONES, URINE: ABNORMAL MG/DL
KETONES, URINE: NEGATIVE MG/DL
KETONES, URINE: NEGATIVE MG/DL
LAB: ABNORMAL
LAB: ABNORMAL
LACTATE DEHYDROGENASE: 258 U/L (ref 135–225)
LACTIC ACID, SEPSIS: 2.2 MMOL/L (ref 0.5–1.9)
LACTIC ACID, SEPSIS: 3 MMOL/L (ref 0.5–1.9)
LACTIC ACID, SEPSIS: 3.2 MMOL/L (ref 0.5–1.9)
LACTIC ACID: 1 MMOL/L (ref 0.5–2.2)
LACTIC ACID: 1 MMOL/L (ref 0.5–2.2)
LDL CHOLESTEROL CALCULATED: 181 MG/DL (ref 0–99)
LDL CHOLESTEROL CALCULATED: 91 MG/DL (ref 0–99)
LEUKOCYTE ESTERASE, URINE: ABNORMAL
LEUKOCYTE ESTERASE, URINE: NEGATIVE
LV EF: 75 %
LVEF MODALITY: NORMAL
LYMPHOCYTES ABSOLUTE: 0 E9/L (ref 1.5–4)
LYMPHOCYTES ABSOLUTE: 0.47 E9/L (ref 1.5–4)
LYMPHOCYTES ABSOLUTE: 0.64 E9/L (ref 1.5–4)
LYMPHOCYTES ABSOLUTE: 0.66 E9/L (ref 1.5–4)
LYMPHOCYTES ABSOLUTE: 0.85 E9/L (ref 1.5–4)
LYMPHOCYTES ABSOLUTE: 0.98 E9/L (ref 1.5–4)
LYMPHOCYTES ABSOLUTE: 1.24 E9/L (ref 1.5–4)
LYMPHOCYTES ABSOLUTE: 1.59 E9/L (ref 1.5–4)
LYMPHOCYTES RELATIVE PERCENT: 1.7 % (ref 20–42)
LYMPHOCYTES RELATIVE PERCENT: 1.7 % (ref 20–42)
LYMPHOCYTES RELATIVE PERCENT: 15.8 % (ref 20–42)
LYMPHOCYTES RELATIVE PERCENT: 2.9 % (ref 20–42)
LYMPHOCYTES RELATIVE PERCENT: 3 % (ref 20–42)
LYMPHOCYTES RELATIVE PERCENT: 3 % (ref 20–42)
LYMPHOCYTES RELATIVE PERCENT: 3.6 % (ref 20–42)
LYMPHOCYTES RELATIVE PERCENT: 3.7 % (ref 20–42)
Lab: ABNORMAL
Lab: ABNORMAL
MAGNESIUM: 1.9 MG/DL (ref 1.6–2.6)
MAGNESIUM: 1.9 MG/DL (ref 1.6–2.6)
MAGNESIUM: 2.1 MG/DL (ref 1.6–2.6)
MAGNESIUM: 2.2 MG/DL (ref 1.6–2.6)
MAGNESIUM: 2.3 MG/DL (ref 1.6–2.6)
MAGNESIUM: 2.3 MG/DL (ref 1.6–2.6)
MAGNESIUM: 2.5 MG/DL (ref 1.6–2.6)
MAGNESIUM: 2.6 MG/DL (ref 1.6–2.6)
MAGNESIUM: 2.6 MG/DL (ref 1.6–2.6)
MAGNESIUM: 2.7 MG/DL (ref 1.6–2.6)
MCH RBC QN AUTO: 29 PG (ref 26–35)
MCH RBC QN AUTO: 29.2 PG (ref 26–35)
MCH RBC QN AUTO: 29.3 PG (ref 26–35)
MCH RBC QN AUTO: 29.4 PG (ref 26–35)
MCH RBC QN AUTO: 29.7 PG (ref 26–35)
MCH RBC QN AUTO: 29.8 PG (ref 26–35)
MCH RBC QN AUTO: 30.2 PG (ref 26–35)
MCH RBC QN AUTO: 30.6 PG (ref 26–35)
MCH RBC QN AUTO: 31.1 PG (ref 26–35)
MCH RBC QN AUTO: 31.3 PG (ref 26–35)
MCH RBC QN AUTO: 31.4 PG (ref 26–35)
MCH RBC QN AUTO: 31.5 PG (ref 26–35)
MCH RBC QN AUTO: 31.7 PG (ref 26–35)
MCH RBC QN AUTO: 31.8 PG (ref 26–35)
MCH RBC QN AUTO: 31.8 PG (ref 26–35)
MCHC RBC AUTO-ENTMCNC: 30.9 % (ref 32–34.5)
MCHC RBC AUTO-ENTMCNC: 30.9 % (ref 32–34.5)
MCHC RBC AUTO-ENTMCNC: 31.2 % (ref 32–34.5)
MCHC RBC AUTO-ENTMCNC: 31.2 % (ref 32–34.5)
MCHC RBC AUTO-ENTMCNC: 31.3 % (ref 32–34.5)
MCHC RBC AUTO-ENTMCNC: 31.4 % (ref 32–34.5)
MCHC RBC AUTO-ENTMCNC: 31.7 % (ref 32–34.5)
MCHC RBC AUTO-ENTMCNC: 31.8 % (ref 32–34.5)
MCHC RBC AUTO-ENTMCNC: 31.9 % (ref 32–34.5)
MCHC RBC AUTO-ENTMCNC: 32 % (ref 32–34.5)
MCHC RBC AUTO-ENTMCNC: 32.2 % (ref 32–34.5)
MCHC RBC AUTO-ENTMCNC: 32.3 % (ref 32–34.5)
MCHC RBC AUTO-ENTMCNC: 32.6 % (ref 32–34.5)
MCHC RBC AUTO-ENTMCNC: 32.7 % (ref 32–34.5)
MCHC RBC AUTO-ENTMCNC: 33.2 % (ref 32–34.5)
MCHC RBC AUTO-ENTMCNC: 33.2 % (ref 32–34.5)
MCHC RBC AUTO-ENTMCNC: 33.9 % (ref 32–34.5)
MCV RBC AUTO: 100 FL (ref 80–99.9)
MCV RBC AUTO: 100.5 FL (ref 80–99.9)
MCV RBC AUTO: 90.2 FL (ref 80–99.9)
MCV RBC AUTO: 91.5 FL (ref 80–99.9)
MCV RBC AUTO: 93.4 FL (ref 80–99.9)
MCV RBC AUTO: 93.8 FL (ref 80–99.9)
MCV RBC AUTO: 93.9 FL (ref 80–99.9)
MCV RBC AUTO: 94.1 FL (ref 80–99.9)
MCV RBC AUTO: 94.3 FL (ref 80–99.9)
MCV RBC AUTO: 95.1 FL (ref 80–99.9)
MCV RBC AUTO: 95.3 FL (ref 80–99.9)
MCV RBC AUTO: 95.4 FL (ref 80–99.9)
MCV RBC AUTO: 96 FL (ref 80–99.9)
MCV RBC AUTO: 97.5 FL (ref 80–99.9)
MCV RBC AUTO: 97.6 FL (ref 80–99.9)
MCV RBC AUTO: 97.7 FL (ref 80–99.9)
MCV RBC AUTO: 99.5 FL (ref 80–99.9)
METAMYELOCYTES RELATIVE PERCENT: 0.9 % (ref 0–1)
METER GLUCOSE: 107 MG/DL (ref 74–99)
METER GLUCOSE: 121 MG/DL (ref 74–99)
METHB: 0.2 % (ref 0–1.5)
METHB: 0.3 % (ref 0–1.5)
MICROALBUMIN UR-MCNC: 39.8 MG/L
MICROALBUMIN/CREAT UR-RTO: 20.5 (ref 0–30)
MODE: ABNORMAL
MODE: ABNORMAL
MONOCYTES ABSOLUTE: 0.62 E9/L (ref 0.1–0.95)
MONOCYTES ABSOLUTE: 0.7 E9/L (ref 0.1–0.95)
MONOCYTES ABSOLUTE: 0.71 E9/L (ref 0.1–0.95)
MONOCYTES ABSOLUTE: 1.05 E9/L (ref 0.1–0.95)
MONOCYTES ABSOLUTE: 1.28 E9/L (ref 0.1–0.95)
MONOCYTES ABSOLUTE: 1.44 E9/L (ref 0.1–0.95)
MONOCYTES ABSOLUTE: 1.57 E9/L (ref 0.1–0.95)
MONOCYTES ABSOLUTE: 1.59 E9/L (ref 0.1–0.95)
MONOCYTES RELATIVE PERCENT: 2.6 % (ref 2–12)
MONOCYTES RELATIVE PERCENT: 2.6 % (ref 2–12)
MONOCYTES RELATIVE PERCENT: 3.5 % (ref 2–12)
MONOCYTES RELATIVE PERCENT: 4.7 % (ref 2–12)
MONOCYTES RELATIVE PERCENT: 4.8 % (ref 2–12)
MONOCYTES RELATIVE PERCENT: 4.9 % (ref 2–12)
MONOCYTES RELATIVE PERCENT: 6 % (ref 2–12)
MONOCYTES RELATIVE PERCENT: 6.2 % (ref 2–12)
MYCOPLASMA PNEUMONIAE BY PCR: NOT DETECTED
MYCOPLASMA PNEUMONIAE BY PCR: NOT DETECTED
NEUTROPHILS ABSOLUTE: 19.61 E9/L (ref 1.8–7.3)
NEUTROPHILS ABSOLUTE: 21.23 E9/L (ref 1.8–7.3)
NEUTROPHILS ABSOLUTE: 22.66 E9/L (ref 1.8–7.3)
NEUTROPHILS ABSOLUTE: 22.7 E9/L (ref 1.8–7.3)
NEUTROPHILS ABSOLUTE: 29.24 E9/L (ref 1.8–7.3)
NEUTROPHILS ABSOLUTE: 29.98 E9/L (ref 1.8–7.3)
NEUTROPHILS ABSOLUTE: 30.5 E9/L (ref 1.8–7.3)
NEUTROPHILS ABSOLUTE: 7.41 E9/L (ref 1.8–7.3)
NEUTROPHILS RELATIVE PERCENT: 73.5 % (ref 43–80)
NEUTROPHILS RELATIVE PERCENT: 88.8 % (ref 43–80)
NEUTROPHILS RELATIVE PERCENT: 89.8 % (ref 43–80)
NEUTROPHILS RELATIVE PERCENT: 90.1 % (ref 43–80)
NEUTROPHILS RELATIVE PERCENT: 90.7 % (ref 43–80)
NEUTROPHILS RELATIVE PERCENT: 94.8 % (ref 43–80)
NEUTROPHILS RELATIVE PERCENT: 94.8 % (ref 43–80)
NEUTROPHILS RELATIVE PERCENT: 96.5 % (ref 43–80)
NITRITE, URINE: NEGATIVE
NITRITE, URINE: NEGATIVE
NITRITE, URINE: POSITIVE
NITRITE, URINE: POSITIVE
O2 CONTENT: 22.2 ML/DL
O2 CONTENT: 22.8 ML/DL
O2 SATURATION: 91.7 % (ref 92–98.5)
O2 SATURATION: 95.3 % (ref 92–98.5)
O2HB: 91.4 % (ref 94–97)
O2HB: 94.7 % (ref 94–97)
OPERATOR ID: 2577
OPERATOR ID: 2577
ORGANISM: ABNORMAL
ORGANISM: ABNORMAL
OSMOLALITY: 292 MOSM/KG (ref 285–310)
OSMOLALITY: 298 MOSM/KG (ref 285–310)
OSMOLALITY: 303 MOSM/KG (ref 285–310)
OSMOLALITY: 313 MOSM/KG (ref 285–310)
OSMOLALITY: 321 MOSM/KG (ref 285–310)
OSMOLALITY: 322 MOSM/KG (ref 285–310)
OSMOLALITY: 332 MOSM/KG (ref 285–310)
OSMOLALITY: 336 MOSM/KG (ref 285–310)
OSMOLALITY: 340 MOSM/KG (ref 285–310)
OSMOLALITY: 342 MOSM/KG (ref 285–310)
OSMOLALITY: 346 MOSM/KG (ref 285–310)
OVALOCYTES: ABNORMAL
PARAINFLUENZA VIRUS 1 BY PCR: NOT DETECTED
PARAINFLUENZA VIRUS 1 BY PCR: NOT DETECTED
PARAINFLUENZA VIRUS 2 BY PCR: NOT DETECTED
PARAINFLUENZA VIRUS 2 BY PCR: NOT DETECTED
PARAINFLUENZA VIRUS 3 BY PCR: NOT DETECTED
PARAINFLUENZA VIRUS 3 BY PCR: NOT DETECTED
PARAINFLUENZA VIRUS 4 BY PCR: NOT DETECTED
PARAINFLUENZA VIRUS 4 BY PCR: NOT DETECTED
PATIENT TEMP: 37 C
PATIENT TEMP: 37 C
PCO2: 30.9 MMHG (ref 35–45)
PCO2: 33.5 MMHG (ref 35–45)
PDW BLD-RTO: 11.9 FL (ref 11.5–15)
PDW BLD-RTO: 12.4 FL (ref 11.5–15)
PDW BLD-RTO: 12.6 FL (ref 11.5–15)
PDW BLD-RTO: 12.8 FL (ref 11.5–15)
PDW BLD-RTO: 12.9 FL (ref 11.5–15)
PDW BLD-RTO: 13 FL (ref 11.5–15)
PDW BLD-RTO: 13.2 FL (ref 11.5–15)
PDW BLD-RTO: 13.2 FL (ref 11.5–15)
PDW BLD-RTO: 13.5 FL (ref 11.5–15)
PDW BLD-RTO: 13.6 FL (ref 11.5–15)
PDW BLD-RTO: 14.1 FL (ref 11.5–15)
PDW BLD-RTO: 14.2 FL (ref 11.5–15)
PDW BLD-RTO: 14.3 FL (ref 11.5–15)
PDW BLD-RTO: 14.4 FL (ref 11.5–15)
PDW BLD-RTO: 14.6 FL (ref 11.5–15)
PERFORMED ON: ABNORMAL
PFO2: 0.78 MMHG/%
PH BLOOD GAS: 7.4 (ref 7.35–7.45)
PH BLOOD GAS: 7.48 (ref 7.35–7.45)
PH UA: 5 (ref 5–9)
PH UA: 5 (ref 5–9)
PH UA: 6 (ref 5–9)
PH UA: 6 (ref 5–9)
PHOSPHORUS: 2 MG/DL (ref 2.5–4.5)
PHOSPHORUS: 2.9 MG/DL (ref 2.5–4.5)
PHOSPHORUS: 3.4 MG/DL (ref 2.5–4.5)
PHOSPHORUS: 3.6 MG/DL (ref 2.5–4.5)
PHOSPHORUS: 3.7 MG/DL (ref 2.5–4.5)
PHOSPHORUS: 3.9 MG/DL (ref 2.5–4.5)
PHOSPHORUS: 3.9 MG/DL (ref 2.5–4.5)
PHOSPHORUS: 4.5 MG/DL (ref 2.5–4.5)
PLATELET # BLD: 195 E9/L (ref 130–450)
PLATELET # BLD: 195 E9/L (ref 130–450)
PLATELET # BLD: 212 E9/L (ref 130–450)
PLATELET # BLD: 241 E9/L (ref 130–450)
PLATELET # BLD: 259 E9/L (ref 130–450)
PLATELET # BLD: 259 E9/L (ref 130–450)
PLATELET # BLD: 261 E9/L (ref 130–450)
PLATELET # BLD: 262 E9/L (ref 130–450)
PLATELET # BLD: 268 E9/L (ref 130–450)
PLATELET # BLD: 279 E9/L (ref 130–450)
PLATELET # BLD: 282 E9/L (ref 130–450)
PLATELET # BLD: 299 E9/L (ref 130–450)
PLATELET # BLD: 305 E9/L (ref 130–450)
PLATELET # BLD: 313 E9/L (ref 130–450)
PLATELET # BLD: 314 E9/L (ref 130–450)
PLATELET # BLD: 337 E9/L (ref 130–450)
PLATELET # BLD: 394 E9/L (ref 130–450)
PMV BLD AUTO: 10 FL (ref 7–12)
PMV BLD AUTO: 10.2 FL (ref 7–12)
PMV BLD AUTO: 10.2 FL (ref 7–12)
PMV BLD AUTO: 11 FL (ref 7–12)
PMV BLD AUTO: 11.1 FL (ref 7–12)
PMV BLD AUTO: 11.2 FL (ref 7–12)
PMV BLD AUTO: 12.1 FL (ref 7–12)
PMV BLD AUTO: 12.3 FL (ref 7–12)
PMV BLD AUTO: 12.4 FL (ref 7–12)
PMV BLD AUTO: 12.7 FL (ref 7–12)
PMV BLD AUTO: 12.9 FL (ref 7–12)
PMV BLD AUTO: 13.1 FL (ref 7–12)
PMV BLD AUTO: 13.7 FL (ref 7–12)
PMV BLD AUTO: 9.7 FL (ref 7–12)
PO2: 61.1 MMHG (ref 75–100)
PO2: 78.4 MMHG (ref 75–100)
POC CHLORIDE: 107 MMOL/L (ref 100–108)
POC CREATININE: 0.6 MG/DL (ref 0.7–1.2)
POC POTASSIUM: 4.6 MMOL/L (ref 3.5–5)
POC SODIUM: 142 MMOL/L (ref 132–146)
POIKILOCYTES: ABNORMAL
POLYCHROMASIA: ABNORMAL
POTASSIUM REFLEX MAGNESIUM: 3.9 MMOL/L (ref 3.5–5)
POTASSIUM REFLEX MAGNESIUM: 4.8 MMOL/L (ref 3.5–5)
POTASSIUM REFLEX MAGNESIUM: 4.8 MMOL/L (ref 3.5–5)
POTASSIUM REFLEX MAGNESIUM: 5.2 MMOL/L (ref 3.5–5)
POTASSIUM SERPL-SCNC: 2.9 MMOL/L (ref 3.5–5)
POTASSIUM SERPL-SCNC: 3.1 MMOL/L (ref 3.5–5)
POTASSIUM SERPL-SCNC: 3.1 MMOL/L (ref 3.5–5)
POTASSIUM SERPL-SCNC: 3.2 MMOL/L (ref 3.5–5)
POTASSIUM SERPL-SCNC: 3.5 MMOL/L (ref 3.5–5)
POTASSIUM SERPL-SCNC: 3.6 MMOL/L (ref 3.5–5)
POTASSIUM SERPL-SCNC: 3.6 MMOL/L (ref 3.5–5)
POTASSIUM SERPL-SCNC: 3.8 MMOL/L (ref 3.5–5)
POTASSIUM SERPL-SCNC: 3.9 MMOL/L (ref 3.5–5)
POTASSIUM SERPL-SCNC: 4 MMOL/L (ref 3.5–5)
POTASSIUM SERPL-SCNC: 4.1 MMOL/L (ref 3.5–5)
POTASSIUM SERPL-SCNC: 4.2 MMOL/L (ref 3.5–5)
POTASSIUM SERPL-SCNC: 4.3 MMOL/L (ref 3.5–5)
POTASSIUM SERPL-SCNC: 4.3 MMOL/L (ref 3.5–5)
POTASSIUM SERPL-SCNC: 4.34 MMOL/L (ref 3.5–5)
POTASSIUM SERPL-SCNC: 4.7 MMOL/L (ref 3.5–5)
POTASSIUM SERPL-SCNC: 5 MMOL/L (ref 3.5–5)
POTASSIUM, UR: >100 MMOL/L
PRO-BNP: 120 PG/ML (ref 0–125)
PRO-BNP: 1385 PG/ML (ref 0–125)
PROCALCITONIN: 0.08 NG/ML (ref 0–0.08)
PROCALCITONIN: 0.08 NG/ML (ref 0–0.08)
PROCALCITONIN: 1.26 NG/ML (ref 0–0.08)
PROCALCITONIN: 1.46 NG/ML (ref 0–0.08)
PROSTATE SPECIFIC ANTIGEN: 1.61 NG/ML (ref 0–4)
PROTEIN UA: ABNORMAL MG/DL
PROTEIN UA: ABNORMAL MG/DL
PROTEIN UA: NEGATIVE MG/DL
PROTEIN UA: NEGATIVE MG/DL
PROTHROMBIN TIME: 11.4 SEC (ref 9.3–12.4)
PROTHROMBIN TIME: 14.6 SEC (ref 9.3–12.4)
RBC # BLD: 4.01 E12/L (ref 3.8–5.8)
RBC # BLD: 4.06 E12/L (ref 3.8–5.8)
RBC # BLD: 4.16 E12/L (ref 3.8–5.8)
RBC # BLD: 4.24 E12/L (ref 3.8–5.8)
RBC # BLD: 4.29 E12/L (ref 3.8–5.8)
RBC # BLD: 4.33 E12/L (ref 3.8–5.8)
RBC # BLD: 4.43 E12/L (ref 3.8–5.8)
RBC # BLD: 4.46 E12/L (ref 3.8–5.8)
RBC # BLD: 4.55 E12/L (ref 3.8–5.8)
RBC # BLD: 4.57 E12/L (ref 3.8–5.8)
RBC # BLD: 4.57 E12/L (ref 3.8–5.8)
RBC # BLD: 4.6 E12/L (ref 3.8–5.8)
RBC # BLD: 4.81 E12/L (ref 3.8–5.8)
RBC # BLD: 4.87 E12/L (ref 3.8–5.8)
RBC # BLD: 4.93 E12/L (ref 3.8–5.8)
RBC # BLD: 5.11 E12/L (ref 3.8–5.8)
RBC # BLD: 5.62 E12/L (ref 3.8–5.8)
RBC # BLD: NORMAL 10*6/UL
RBC UA: ABNORMAL /HPF (ref 0–2)
REASON FOR REJECTION: NORMAL
REJECTED TEST: NORMAL
RESPIRATORY SYNCYTIAL VIRUS BY PCR: NOT DETECTED
RESPIRATORY SYNCYTIAL VIRUS BY PCR: NOT DETECTED
RI(T): 735 %
SARS-COV-2, NAAT: NOT DETECTED
SARS-COV-2, PCR: DETECTED
SARS-COV-2, PCR: DETECTED
SARS-COV-2, PCR: NOT DETECTED
SARS-COV-2: NOT DETECTED
SARS-COV-2: NOT DETECTED
SEDIMENTATION RATE, ERYTHROCYTE: 24 MM/HR (ref 0–15)
SODIUM BLD-SCNC: 135 MMOL/L (ref 132–146)
SODIUM BLD-SCNC: 135 MMOL/L (ref 132–146)
SODIUM BLD-SCNC: 136 MMOL/L (ref 132–146)
SODIUM BLD-SCNC: 136 MMOL/L (ref 132–146)
SODIUM BLD-SCNC: 139 MMOL/L (ref 132–146)
SODIUM BLD-SCNC: 139 MMOL/L (ref 132–146)
SODIUM BLD-SCNC: 140 MMOL/L (ref 132–146)
SODIUM BLD-SCNC: 141 MMOL/L (ref 132–146)
SODIUM BLD-SCNC: 141 MMOL/L (ref 132–146)
SODIUM BLD-SCNC: 142 MMOL/L (ref 132–146)
SODIUM BLD-SCNC: 142 MMOL/L (ref 132–146)
SODIUM BLD-SCNC: 143 MMOL/L (ref 132–146)
SODIUM BLD-SCNC: 145 MMOL/L (ref 132–146)
SODIUM BLD-SCNC: 148 MMOL/L (ref 132–146)
SODIUM BLD-SCNC: 148 MMOL/L (ref 132–146)
SODIUM BLD-SCNC: 149 MMOL/L (ref 132–146)
SODIUM BLD-SCNC: 149 MMOL/L (ref 132–146)
SODIUM BLD-SCNC: 150 MMOL/L (ref 132–146)
SODIUM BLD-SCNC: 150 MMOL/L (ref 132–146)
SODIUM BLD-SCNC: 152 MMOL/L (ref 132–146)
SODIUM BLD-SCNC: 153 MMOL/L (ref 132–146)
SODIUM BLD-SCNC: 153 MMOL/L (ref 132–146)
SODIUM BLD-SCNC: 154 MMOL/L (ref 132–146)
SODIUM BLD-SCNC: 154 MMOL/L (ref 132–146)
SODIUM BLD-SCNC: 155 MMOL/L (ref 132–146)
SODIUM BLD-SCNC: 155 MMOL/L (ref 132–146)
SODIUM BLD-SCNC: 156 MMOL/L (ref 132–146)
SODIUM BLD-SCNC: 157 MMOL/L (ref 132–146)
SODIUM BLD-SCNC: 157 MMOL/L (ref 132–146)
SODIUM BLD-SCNC: 158 MMOL/L (ref 132–146)
SODIUM BLD-SCNC: 158 MMOL/L (ref 132–146)
SODIUM BLD-SCNC: 159 MMOL/L (ref 132–146)
SODIUM BLD-SCNC: 160 MMOL/L (ref 132–146)
SODIUM BLD-SCNC: 161 MMOL/L (ref 132–146)
SODIUM BLD-SCNC: 163 MMOL/L (ref 132–146)
SODIUM URINE: <20 MMOL/L
SOURCE, BLOOD GAS: ABNORMAL
SOURCE, BLOOD GAS: ABNORMAL
SOURCE: NORMAL
SOURCE: NORMAL
SPECIFIC GRAVITY UA: 1.02 (ref 1–1.03)
SPECIFIC GRAVITY UA: >=1.03 (ref 1–1.03)
T3 FREE: 3.3 PG/ML (ref 2–4.4)
T4 FREE: 0.99 NG/DL (ref 0.93–1.7)
THB: 17.1 G/DL (ref 11.5–16.5)
THB: 17.3 G/DL (ref 11.5–16.5)
TIME ANALYZED: 350
TIME ANALYZED: 556
TOTAL PROTEIN: 5.7 G/DL (ref 6.4–8.3)
TOTAL PROTEIN: 5.9 G/DL (ref 6.4–8.3)
TOTAL PROTEIN: 5.9 G/DL (ref 6.4–8.3)
TOTAL PROTEIN: 6.1 G/DL (ref 6.4–8.3)
TOTAL PROTEIN: 6.2 G/DL (ref 6.4–8.3)
TOTAL PROTEIN: 6.7 G/DL (ref 6.4–8.3)
TOTAL PROTEIN: 6.9 G/DL (ref 6.4–8.3)
TOTAL PROTEIN: 7 G/DL (ref 6.4–8.3)
TRIGL SERPL-MCNC: 101 MG/DL (ref 0–149)
TRIGL SERPL-MCNC: 127 MG/DL (ref 0–149)
TROPONIN: 0.1 NG/ML (ref 0–0.03)
TROPONIN: <0.01 NG/ML (ref 0–0.03)
TSH SERPL DL<=0.05 MIU/L-ACNC: 0.96 UIU/ML (ref 0.27–4.2)
TSH SERPL DL<=0.05 MIU/L-ACNC: 2.92 UIU/ML (ref 0.27–4.2)
UREA NITROGEN, UR: 767 MG/DL (ref 800–1666)
URINE CULTURE, ROUTINE: ABNORMAL
URINE CULTURE, ROUTINE: ABNORMAL
URINE CULTURE, ROUTINE: NORMAL
UROBILINOGEN, URINE: 0.2 E.U./DL
UROBILINOGEN, URINE: 1 E.U./DL
VANCOMYCIN RANDOM: 15.5 MCG/ML (ref 5–40)
VANCOMYCIN RANDOM: 21 MCG/ML (ref 5–40)
VANCOMYCIN TROUGH: 12.6 MCG/ML (ref 5–16)
VLDLC SERPL CALC-MCNC: 20 MG/DL
VLDLC SERPL CALC-MCNC: 25 MG/DL
WBC # BLD: 10.1 E9/L (ref 4.5–11.5)
WBC # BLD: 13.2 E9/L (ref 4.5–11.5)
WBC # BLD: 13.8 E9/L (ref 4.5–11.5)
WBC # BLD: 13.9 E9/L (ref 4.5–11.5)
WBC # BLD: 14.2 E9/L (ref 4.5–11.5)
WBC # BLD: 14.6 E9/L (ref 4.5–11.5)
WBC # BLD: 15.7 E9/L (ref 4.5–11.5)
WBC # BLD: 15.9 E9/L (ref 4.5–11.5)
WBC # BLD: 17.9 E9/L (ref 4.5–11.5)
WBC # BLD: 21.8 E9/L (ref 4.5–11.5)
WBC # BLD: 23.4 E9/L (ref 4.5–11.5)
WBC # BLD: 23.6 E9/L (ref 4.5–11.5)
WBC # BLD: 23.9 E9/L (ref 4.5–11.5)
WBC # BLD: 32.1 E9/L (ref 4.5–11.5)
WBC # BLD: 32.2 E9/L (ref 4.5–11.5)
WBC # BLD: 33.4 E9/L (ref 4.5–11.5)
WBC # BLD: 7.8 E9/L (ref 4.5–11.5)
WBC UA: ABNORMAL /HPF (ref 0–5)

## 2020-01-01 PROCEDURE — 84540 ASSAY OF URINE/UREA-N: CPT

## 2020-01-01 PROCEDURE — 84484 ASSAY OF TROPONIN QUANT: CPT

## 2020-01-01 PROCEDURE — 85730 THROMBOPLASTIN TIME PARTIAL: CPT

## 2020-01-01 PROCEDURE — 87077 CULTURE AEROBIC IDENTIFY: CPT

## 2020-01-01 PROCEDURE — 84300 ASSAY OF URINE SODIUM: CPT

## 2020-01-01 PROCEDURE — 6360000002 HC RX W HCPCS: Performed by: INTERNAL MEDICINE

## 2020-01-01 PROCEDURE — 84100 ASSAY OF PHOSPHORUS: CPT

## 2020-01-01 PROCEDURE — 03CH3ZZ EXTIRPATION OF MATTER FROM RIGHT COMMON CAROTID ARTERY, PERCUTANEOUS APPROACH: ICD-10-PCS | Performed by: PSYCHIATRY & NEUROLOGY

## 2020-01-01 PROCEDURE — 85610 PROTHROMBIN TIME: CPT

## 2020-01-01 PROCEDURE — 96375 TX/PRO/DX INJ NEW DRUG ADDON: CPT

## 2020-01-01 PROCEDURE — 2580000003 HC RX 258: Performed by: NURSE PRACTITIONER

## 2020-01-01 PROCEDURE — 87186 SC STD MICRODIL/AGAR DIL: CPT

## 2020-01-01 PROCEDURE — 2580000003 HC RX 258: Performed by: PSYCHIATRY & NEUROLOGY

## 2020-01-01 PROCEDURE — 99223 1ST HOSP IP/OBS HIGH 75: CPT | Performed by: PSYCHIATRY & NEUROLOGY

## 2020-01-01 PROCEDURE — 96374 THER/PROPH/DIAG INJ IV PUSH: CPT

## 2020-01-01 PROCEDURE — 80048 BASIC METABOLIC PNL TOTAL CA: CPT

## 2020-01-01 PROCEDURE — 87088 URINE BACTERIA CULTURE: CPT

## 2020-01-01 PROCEDURE — 82962 GLUCOSE BLOOD TEST: CPT

## 2020-01-01 PROCEDURE — 84295 ASSAY OF SERUM SODIUM: CPT

## 2020-01-01 PROCEDURE — 6370000000 HC RX 637 (ALT 250 FOR IP): Performed by: NURSE PRACTITIONER

## 2020-01-01 PROCEDURE — 6360000004 HC RX CONTRAST MEDICATION: Performed by: PSYCHIATRY & NEUROLOGY

## 2020-01-01 PROCEDURE — B31R1ZZ FLUOROSCOPY OF INTRACRANIAL ARTERIES USING LOW OSMOLAR CONTRAST: ICD-10-PCS | Performed by: PSYCHIATRY & NEUROLOGY

## 2020-01-01 PROCEDURE — 84132 ASSAY OF SERUM POTASSIUM: CPT

## 2020-01-01 PROCEDURE — 80053 COMPREHEN METABOLIC PANEL: CPT

## 2020-01-01 PROCEDURE — 6370000000 HC RX 637 (ALT 250 FOR IP): Performed by: INTERNAL MEDICINE

## 2020-01-01 PROCEDURE — 97530 THERAPEUTIC ACTIVITIES: CPT

## 2020-01-01 PROCEDURE — 2580000003 HC RX 258: Performed by: INTERNAL MEDICINE

## 2020-01-01 PROCEDURE — 2500000003 HC RX 250 WO HCPCS: Performed by: NURSE PRACTITIONER

## 2020-01-01 PROCEDURE — 99254 IP/OBS CNSLTJ NEW/EST MOD 60: CPT | Performed by: PHYSICAL MEDICINE & REHABILITATION

## 2020-01-01 PROCEDURE — 36415 COLL VENOUS BLD VENIPUNCTURE: CPT

## 2020-01-01 PROCEDURE — 96365 THER/PROPH/DIAG IV INF INIT: CPT

## 2020-01-01 PROCEDURE — 85384 FIBRINOGEN ACTIVITY: CPT

## 2020-01-01 PROCEDURE — 87040 BLOOD CULTURE FOR BACTERIA: CPT

## 2020-01-01 PROCEDURE — 6360000002 HC RX W HCPCS: Performed by: NURSE PRACTITIONER

## 2020-01-01 PROCEDURE — 99233 SBSQ HOSP IP/OBS HIGH 50: CPT | Performed by: NURSE PRACTITIONER

## 2020-01-01 PROCEDURE — 71045 X-RAY EXAM CHEST 1 VIEW: CPT

## 2020-01-01 PROCEDURE — 6370000000 HC RX 637 (ALT 250 FOR IP): Performed by: SURGERY

## 2020-01-01 PROCEDURE — 36410 VNPNXR 3YR/> PHY/QHP DX/THER: CPT

## 2020-01-01 PROCEDURE — 83735 ASSAY OF MAGNESIUM: CPT

## 2020-01-01 PROCEDURE — 82436 ASSAY OF URINE CHLORIDE: CPT

## 2020-01-01 PROCEDURE — 2700000000 HC OXYGEN THERAPY PER DAY

## 2020-01-01 PROCEDURE — 97129 THER IVNTJ 1ST 15 MIN: CPT

## 2020-01-01 PROCEDURE — 83880 ASSAY OF NATRIURETIC PEPTIDE: CPT

## 2020-01-01 PROCEDURE — 84443 ASSAY THYROID STIM HORMONE: CPT

## 2020-01-01 PROCEDURE — 6370000000 HC RX 637 (ALT 250 FOR IP): Performed by: PSYCHIATRY & NEUROLOGY

## 2020-01-01 PROCEDURE — 2500000003 HC RX 250 WO HCPCS

## 2020-01-01 PROCEDURE — 85025 COMPLETE CBC W/AUTO DIFF WBC: CPT

## 2020-01-01 PROCEDURE — 2000000000 HC ICU R&B

## 2020-01-01 PROCEDURE — 99284 EMERGENCY DEPT VISIT MOD MDM: CPT

## 2020-01-01 PROCEDURE — 6360000002 HC RX W HCPCS: Performed by: PSYCHIATRY & NEUROLOGY

## 2020-01-01 PROCEDURE — 80202 ASSAY OF VANCOMYCIN: CPT

## 2020-01-01 PROCEDURE — 97166 OT EVAL MOD COMPLEX 45 MIN: CPT

## 2020-01-01 PROCEDURE — 2060000000 HC ICU INTERMEDIATE R&B

## 2020-01-01 PROCEDURE — 0042T CT BRAIN PERFUSION: CPT | Performed by: RADIOLOGY

## 2020-01-01 PROCEDURE — 2140000000 HC CCU INTERMEDIATE R&B

## 2020-01-01 PROCEDURE — 1200000000 HC SEMI PRIVATE

## 2020-01-01 PROCEDURE — G0378 HOSPITAL OBSERVATION PER HR: HCPCS

## 2020-01-01 PROCEDURE — 70496 CT ANGIOGRAPHY HEAD: CPT | Performed by: RADIOLOGY

## 2020-01-01 PROCEDURE — 84145 PROCALCITONIN (PCT): CPT

## 2020-01-01 PROCEDURE — 2500000003 HC RX 250 WO HCPCS: Performed by: PSYCHIATRY & NEUROLOGY

## 2020-01-01 PROCEDURE — 85378 FIBRIN DEGRADE SEMIQUANT: CPT

## 2020-01-01 PROCEDURE — 99232 SBSQ HOSP IP/OBS MODERATE 35: CPT | Performed by: SURGERY

## 2020-01-01 PROCEDURE — 99291 CRITICAL CARE FIRST HOUR: CPT | Performed by: SURGERY

## 2020-01-01 PROCEDURE — 05HB33Z INSERTION OF INFUSION DEVICE INTO RIGHT BASILIC VEIN, PERCUTANEOUS APPROACH: ICD-10-PCS | Performed by: INTERNAL MEDICINE

## 2020-01-01 PROCEDURE — APPSS15 APP SPLIT SHARED TIME 0-15 MINUTES: Performed by: NURSE PRACTITIONER

## 2020-01-01 PROCEDURE — 6360000002 HC RX W HCPCS: Performed by: FAMILY MEDICINE

## 2020-01-01 PROCEDURE — 85027 COMPLETE CBC AUTOMATED: CPT

## 2020-01-01 PROCEDURE — 83036 HEMOGLOBIN GLYCOSYLATED A1C: CPT

## 2020-01-01 PROCEDURE — 82044 UR ALBUMIN SEMIQUANTITATIVE: CPT

## 2020-01-01 PROCEDURE — 6360000002 HC RX W HCPCS: Performed by: SURGERY

## 2020-01-01 PROCEDURE — 99285 EMERGENCY DEPT VISIT HI MDM: CPT

## 2020-01-01 PROCEDURE — 6360000002 HC RX W HCPCS: Performed by: PHYSICIAN ASSISTANT

## 2020-01-01 PROCEDURE — 0042T CT BRAIN PERFUSION: CPT

## 2020-01-01 PROCEDURE — 83605 ASSAY OF LACTIC ACID: CPT

## 2020-01-01 PROCEDURE — 82570 ASSAY OF URINE CREATININE: CPT

## 2020-01-01 PROCEDURE — 2500000003 HC RX 250 WO HCPCS: Performed by: RADIOLOGY

## 2020-01-01 PROCEDURE — 97535 SELF CARE MNGMENT TRAINING: CPT

## 2020-01-01 PROCEDURE — 99291 CRITICAL CARE FIRST HOUR: CPT | Performed by: PSYCHIATRY & NEUROLOGY

## 2020-01-01 PROCEDURE — 99231 SBSQ HOSP IP/OBS SF/LOW 25: CPT | Performed by: FAMILY MEDICINE

## 2020-01-01 PROCEDURE — 80061 LIPID PANEL: CPT

## 2020-01-01 PROCEDURE — 94760 N-INVAS EAR/PLS OXIMETRY 1: CPT

## 2020-01-01 PROCEDURE — 6360000004 HC RX CONTRAST MEDICATION: Performed by: RADIOLOGY

## 2020-01-01 PROCEDURE — G0103 PSA SCREENING: HCPCS

## 2020-01-01 PROCEDURE — 83930 ASSAY OF BLOOD OSMOLALITY: CPT

## 2020-01-01 PROCEDURE — 6360000002 HC RX W HCPCS

## 2020-01-01 PROCEDURE — 4A03X5D MEASUREMENT OF ARTERIAL FLOW, INTRACRANIAL, EXTERNAL APPROACH: ICD-10-PCS | Performed by: PSYCHIATRY & NEUROLOGY

## 2020-01-01 PROCEDURE — 70450 CT HEAD/BRAIN W/O DYE: CPT

## 2020-01-01 PROCEDURE — 74018 RADEX ABDOMEN 1 VIEW: CPT

## 2020-01-01 PROCEDURE — 93010 ELECTROCARDIOGRAM REPORT: CPT | Performed by: INTERNAL MEDICINE

## 2020-01-01 PROCEDURE — 76937 US GUIDE VASCULAR ACCESS: CPT

## 2020-01-01 PROCEDURE — 0202U NFCT DS 22 TRGT SARS-COV-2: CPT

## 2020-01-01 PROCEDURE — 70498 CT ANGIOGRAPHY NECK: CPT

## 2020-01-01 PROCEDURE — 93005 ELECTROCARDIOGRAM TRACING: CPT | Performed by: EMERGENCY MEDICINE

## 2020-01-01 PROCEDURE — 81001 URINALYSIS AUTO W/SCOPE: CPT

## 2020-01-01 PROCEDURE — 70551 MRI BRAIN STEM W/O DYE: CPT

## 2020-01-01 PROCEDURE — 51702 INSERT TEMP BLADDER CATH: CPT

## 2020-01-01 PROCEDURE — 92526 ORAL FUNCTION THERAPY: CPT | Performed by: SPEECH-LANGUAGE PATHOLOGIST

## 2020-01-01 PROCEDURE — 2580000003 HC RX 258: Performed by: PHYSICIAN ASSISTANT

## 2020-01-01 PROCEDURE — 70498 CT ANGIOGRAPHY NECK: CPT | Performed by: RADIOLOGY

## 2020-01-01 PROCEDURE — 73030 X-RAY EXAM OF SHOULDER: CPT

## 2020-01-01 PROCEDURE — 37799 UNLISTED PX VASCULAR SURGERY: CPT

## 2020-01-01 PROCEDURE — 82728 ASSAY OF FERRITIN: CPT

## 2020-01-01 PROCEDURE — 92523 SPEECH SOUND LANG COMPREHEN: CPT

## 2020-01-01 PROCEDURE — 3700000001 HC ADD 15 MINUTES (ANESTHESIA)

## 2020-01-01 PROCEDURE — 7100000000 HC PACU RECOVERY - FIRST 15 MIN

## 2020-01-01 PROCEDURE — 83615 LACTATE (LD) (LDH) ENZYME: CPT

## 2020-01-01 PROCEDURE — 86140 C-REACTIVE PROTEIN: CPT

## 2020-01-01 PROCEDURE — 99232 SBSQ HOSP IP/OBS MODERATE 35: CPT | Performed by: INTERNAL MEDICINE

## 2020-01-01 PROCEDURE — 99255 IP/OBS CONSLTJ NEW/EST HI 80: CPT | Performed by: INTERNAL MEDICINE

## 2020-01-01 PROCEDURE — 99212 OFFICE O/P EST SF 10 MIN: CPT

## 2020-01-01 PROCEDURE — 85651 RBC SED RATE NONAUTOMATED: CPT

## 2020-01-01 PROCEDURE — 96376 TX/PRO/DX INJ SAME DRUG ADON: CPT

## 2020-01-01 PROCEDURE — 6370000000 HC RX 637 (ALT 250 FOR IP): Performed by: EMERGENCY MEDICINE

## 2020-01-01 PROCEDURE — 99233 SBSQ HOSP IP/OBS HIGH 50: CPT | Performed by: INTERNAL MEDICINE

## 2020-01-01 PROCEDURE — 93306 TTE W/DOPPLER COMPLETE: CPT

## 2020-01-01 PROCEDURE — 82947 ASSAY GLUCOSE BLOOD QUANT: CPT

## 2020-01-01 PROCEDURE — 2580000003 HC RX 258: Performed by: EMERGENCY MEDICINE

## 2020-01-01 PROCEDURE — 99232 SBSQ HOSP IP/OBS MODERATE 35: CPT | Performed by: FAMILY MEDICINE

## 2020-01-01 PROCEDURE — U0003 INFECTIOUS AGENT DETECTION BY NUCLEIC ACID (DNA OR RNA); SEVERE ACUTE RESPIRATORY SYNDROME CORONAVIRUS 2 (SARS-COV-2) (CORONAVIRUS DISEASE [COVID-19]), AMPLIFIED PROBE TECHNIQUE, MAKING USE OF HIGH THROUGHPUT TECHNOLOGIES AS DESCRIBED BY CMS-2020-01-R: HCPCS

## 2020-01-01 PROCEDURE — 99283 EMERGENCY DEPT VISIT LOW MDM: CPT

## 2020-01-01 PROCEDURE — 6360000002 HC RX W HCPCS: Performed by: NURSE ANESTHETIST, CERTIFIED REGISTERED

## 2020-01-01 PROCEDURE — 36592 COLLECT BLOOD FROM PICC: CPT

## 2020-01-01 PROCEDURE — 82565 ASSAY OF CREATININE: CPT

## 2020-01-01 PROCEDURE — 6360000002 HC RX W HCPCS: Performed by: EMERGENCY MEDICINE

## 2020-01-01 PROCEDURE — 96360 HYDRATION IV INFUSION INIT: CPT

## 2020-01-01 PROCEDURE — 74230 X-RAY XM SWLNG FUNCJ C+: CPT

## 2020-01-01 PROCEDURE — 61645 PERQ ART M-THROMBECT &/NFS: CPT

## 2020-01-01 PROCEDURE — 82435 ASSAY OF BLOOD CHLORIDE: CPT

## 2020-01-01 PROCEDURE — 99233 SBSQ HOSP IP/OBS HIGH 50: CPT | Performed by: FAMILY MEDICINE

## 2020-01-01 PROCEDURE — 71250 CT THORAX DX C-: CPT

## 2020-01-01 PROCEDURE — 97530 THERAPEUTIC ACTIVITIES: CPT | Performed by: PHYSICAL THERAPIST

## 2020-01-01 PROCEDURE — APPSS30 APP SPLIT SHARED TIME 16-30 MINUTES: Performed by: NURSE PRACTITIONER

## 2020-01-01 PROCEDURE — 92611 MOTION FLUOROSCOPY/SWALLOW: CPT | Performed by: SPEECH-LANGUAGE PATHOLOGIST

## 2020-01-01 PROCEDURE — B3161ZZ FLUOROSCOPY OF RIGHT INTERNAL CAROTID ARTERY USING LOW OSMOLAR CONTRAST: ICD-10-PCS | Performed by: PSYCHIATRY & NEUROLOGY

## 2020-01-01 PROCEDURE — U0002 COVID-19 LAB TEST NON-CDC: HCPCS

## 2020-01-01 PROCEDURE — 97110 THERAPEUTIC EXERCISES: CPT

## 2020-01-01 PROCEDURE — 99232 SBSQ HOSP IP/OBS MODERATE 35: CPT | Performed by: PHYSICIAN ASSISTANT

## 2020-01-01 PROCEDURE — 99253 IP/OBS CNSLTJ NEW/EST LOW 45: CPT | Performed by: STUDENT IN AN ORGANIZED HEALTH CARE EDUCATION/TRAINING PROGRAM

## 2020-01-01 PROCEDURE — 03CG3ZZ EXTIRPATION OF MATTER FROM INTRACRANIAL ARTERY, PERCUTANEOUS APPROACH: ICD-10-PCS | Performed by: PSYCHIATRY & NEUROLOGY

## 2020-01-01 PROCEDURE — 84439 ASSAY OF FREE THYROXINE: CPT

## 2020-01-01 PROCEDURE — 84133 ASSAY OF URINE POTASSIUM: CPT

## 2020-01-01 PROCEDURE — 3700000000 HC ANESTHESIA ATTENDED CARE

## 2020-01-01 PROCEDURE — 72125 CT NECK SPINE W/O DYE: CPT

## 2020-01-01 PROCEDURE — 97161 PT EVAL LOW COMPLEX 20 MIN: CPT | Performed by: PHYSICAL THERAPIST

## 2020-01-01 PROCEDURE — 70496 CT ANGIOGRAPHY HEAD: CPT

## 2020-01-01 PROCEDURE — 84481 FREE ASSAY (FT-3): CPT

## 2020-01-01 PROCEDURE — 99448 NTRPROF PH1/NTRNET/EHR 21-30: CPT | Performed by: PSYCHIATRY & NEUROLOGY

## 2020-01-01 PROCEDURE — 7100000001 HC PACU RECOVERY - ADDTL 15 MIN

## 2020-01-01 PROCEDURE — 99233 SBSQ HOSP IP/OBS HIGH 50: CPT | Performed by: SURGERY

## 2020-01-01 PROCEDURE — C1751 CATH, INF, PER/CENT/MIDLINE: HCPCS

## 2020-01-01 PROCEDURE — 97162 PT EVAL MOD COMPLEX 30 MIN: CPT

## 2020-01-01 PROCEDURE — 73502 X-RAY EXAM HIP UNI 2-3 VIEWS: CPT

## 2020-01-01 PROCEDURE — 74176 CT ABD & PELVIS W/O CONTRAST: CPT

## 2020-01-01 PROCEDURE — 92610 EVALUATE SWALLOWING FUNCTION: CPT

## 2020-01-01 PROCEDURE — B3131ZZ FLUOROSCOPY OF RIGHT COMMON CAROTID ARTERY USING LOW OSMOLAR CONTRAST: ICD-10-PCS | Performed by: PSYCHIATRY & NEUROLOGY

## 2020-01-01 PROCEDURE — 82805 BLOOD GASES W/O2 SATURATION: CPT

## 2020-01-01 PROCEDURE — 2580000003 HC RX 258: Performed by: NURSE ANESTHETIST, CERTIFIED REGISTERED

## 2020-01-01 PROCEDURE — 2500000003 HC RX 250 WO HCPCS: Performed by: NURSE ANESTHETIST, CERTIFIED REGISTERED

## 2020-01-01 PROCEDURE — C1769 GUIDE WIRE: HCPCS

## 2020-01-01 PROCEDURE — 36569 INSJ PICC 5 YR+ W/O IMAGING: CPT

## 2020-01-01 PROCEDURE — 92526 ORAL FUNCTION THERAPY: CPT

## 2020-01-01 PROCEDURE — 99222 1ST HOSP IP/OBS MODERATE 55: CPT | Performed by: CLINICAL NURSE SPECIALIST

## 2020-01-01 RX ORDER — DEXAMETHASONE 4 MG/1
6 TABLET ORAL DAILY
Status: DISCONTINUED | OUTPATIENT
Start: 2020-01-01 | End: 2020-01-01

## 2020-01-01 RX ORDER — MIRTAZAPINE 30 MG/1
30 TABLET, FILM COATED ORAL NIGHTLY
Status: ON HOLD | COMMUNITY
End: 2020-01-01 | Stop reason: HOSPADM

## 2020-01-01 RX ORDER — ACYCLOVIR 200 MG/5ML
400 SUSPENSION ORAL 3 TIMES DAILY
Status: CANCELLED | OUTPATIENT
Start: 2020-01-01

## 2020-01-01 RX ORDER — DEXAMETHASONE 1 MG
3 TABLET ORAL DAILY
Status: DISCONTINUED | OUTPATIENT
Start: 2020-01-01 | End: 2020-01-01

## 2020-01-01 RX ORDER — SENNA PLUS 8.6 MG/1
1 TABLET ORAL NIGHTLY
Status: DISCONTINUED | OUTPATIENT
Start: 2020-01-01 | End: 2020-01-01 | Stop reason: HOSPADM

## 2020-01-01 RX ORDER — SODIUM CHLORIDE 0.9 % (FLUSH) 0.9 %
10 SYRINGE (ML) INJECTION PRN
Status: CANCELLED | OUTPATIENT
Start: 2020-01-01

## 2020-01-01 RX ORDER — SODIUM CHLORIDE 0.9 % (FLUSH) 0.9 %
10 SYRINGE (ML) INJECTION PRN
Status: DISCONTINUED | OUTPATIENT
Start: 2020-01-01 | End: 2020-01-01 | Stop reason: HOSPADM

## 2020-01-01 RX ORDER — 3% SODIUM CHLORIDE 3 G/100ML
65 INJECTION, SOLUTION INTRAVENOUS CONTINUOUS
Status: DISCONTINUED | OUTPATIENT
Start: 2020-01-01 | End: 2020-01-01

## 2020-01-01 RX ORDER — ACETAMINOPHEN 650 MG/1
650 SUPPOSITORY RECTAL EVERY 6 HOURS PRN
Status: CANCELLED | OUTPATIENT
Start: 2020-01-01

## 2020-01-01 RX ORDER — BISACODYL 10 MG
10 SUPPOSITORY, RECTAL RECTAL DAILY
Status: DISCONTINUED | OUTPATIENT
Start: 2020-01-01 | End: 2020-01-01 | Stop reason: HOSPADM

## 2020-01-01 RX ORDER — AMOXICILLIN AND CLAVULANATE POTASSIUM 250; 62.5 MG/5ML; MG/5ML
1000 POWDER, FOR SUSPENSION ORAL 2 TIMES DAILY
Qty: 400 ML | Refills: 0
Start: 2020-01-01 | End: 2020-01-01

## 2020-01-01 RX ORDER — ATORVASTATIN CALCIUM 80 MG/1
80 TABLET, FILM COATED ORAL NIGHTLY
Status: DISCONTINUED | OUTPATIENT
Start: 2020-01-01 | End: 2020-01-01

## 2020-01-01 RX ORDER — MORPHINE SULFATE 2 MG/ML
1 INJECTION, SOLUTION INTRAMUSCULAR; INTRAVENOUS
Status: DISCONTINUED | OUTPATIENT
Start: 2020-01-01 | End: 2020-01-01

## 2020-01-01 RX ORDER — POTASSIUM CHLORIDE 29.8 MG/ML
40 INJECTION INTRAVENOUS
Status: COMPLETED | OUTPATIENT
Start: 2020-01-01 | End: 2020-01-01

## 2020-01-01 RX ORDER — LEVOTHYROXINE SODIUM 0.05 MG/1
50 TABLET ORAL DAILY
Status: DISCONTINUED | OUTPATIENT
Start: 2020-01-01 | End: 2020-01-01 | Stop reason: HOSPADM

## 2020-01-01 RX ORDER — ACETAMINOPHEN 325 MG/1
650 TABLET ORAL ONCE
Status: DISCONTINUED | OUTPATIENT
Start: 2020-01-01 | End: 2020-01-01 | Stop reason: SDUPTHER

## 2020-01-01 RX ORDER — CHOLECALCIFEROL (VITAMIN D3) 50 MCG
2000 TABLET ORAL DAILY
Status: DISCONTINUED | OUTPATIENT
Start: 2020-01-01 | End: 2020-01-01 | Stop reason: HOSPADM

## 2020-01-01 RX ORDER — HEPARIN SODIUM 10000 [USP'U]/ML
5000 INJECTION, SOLUTION INTRAVENOUS; SUBCUTANEOUS EVERY 8 HOURS
Status: DISCONTINUED | OUTPATIENT
Start: 2020-01-01 | End: 2020-01-01 | Stop reason: HOSPADM

## 2020-01-01 RX ORDER — ACYCLOVIR 200 MG/5ML
400 SUSPENSION ORAL 3 TIMES DAILY
Status: DISCONTINUED | OUTPATIENT
Start: 2020-01-01 | End: 2020-01-01 | Stop reason: HOSPADM

## 2020-01-01 RX ORDER — CYCLOBENZAPRINE HCL 5 MG
5 TABLET ORAL 3 TIMES DAILY
Status: ON HOLD | COMMUNITY
End: 2020-01-01 | Stop reason: HOSPADM

## 2020-01-01 RX ORDER — FEBUXOSTAT 40 MG/1
80 TABLET, FILM COATED ORAL DAILY
Status: DISCONTINUED | OUTPATIENT
Start: 2020-01-01 | End: 2020-01-01 | Stop reason: HOSPADM

## 2020-01-01 RX ORDER — HEPARIN SODIUM (PORCINE) LOCK FLUSH IV SOLN 100 UNIT/ML 100 UNIT/ML
3 SOLUTION INTRAVENOUS EVERY 12 HOURS SCHEDULED
Status: DISCONTINUED | OUTPATIENT
Start: 2020-01-01 | End: 2020-01-01 | Stop reason: HOSPADM

## 2020-01-01 RX ORDER — POTASSIUM CHLORIDE 29.8 MG/ML
40 INJECTION INTRAVENOUS ONCE
Status: COMPLETED | OUTPATIENT
Start: 2020-01-01 | End: 2020-01-01

## 2020-01-01 RX ORDER — FENTANYL CITRATE 50 UG/ML
INJECTION, SOLUTION INTRAMUSCULAR; INTRAVENOUS PRN
Status: DISCONTINUED | OUTPATIENT
Start: 2020-01-01 | End: 2020-01-01 | Stop reason: SDUPTHER

## 2020-01-01 RX ORDER — COLCHICINE 0.6 MG/1
0.6 TABLET ORAL DAILY
Status: DISCONTINUED | OUTPATIENT
Start: 2020-01-01 | End: 2020-01-01 | Stop reason: HOSPADM

## 2020-01-01 RX ORDER — PROMETHAZINE HYDROCHLORIDE 25 MG/1
12.5 TABLET ORAL EVERY 6 HOURS PRN
Status: DISCONTINUED | OUTPATIENT
Start: 2020-01-01 | End: 2020-01-01

## 2020-01-01 RX ORDER — ACETAMINOPHEN 325 MG/1
650 TABLET ORAL EVERY 6 HOURS PRN
Status: CANCELLED | OUTPATIENT
Start: 2020-01-01

## 2020-01-01 RX ORDER — HEPARIN SODIUM (PORCINE) LOCK FLUSH IV SOLN 100 UNIT/ML 100 UNIT/ML
3 SOLUTION INTRAVENOUS EVERY 12 HOURS SCHEDULED
Status: CANCELLED | OUTPATIENT
Start: 2020-01-01

## 2020-01-01 RX ORDER — AMLODIPINE BESYLATE 10 MG/1
10 TABLET ORAL DAILY
Status: CANCELLED | OUTPATIENT
Start: 2020-01-01

## 2020-01-01 RX ORDER — FEBUXOSTAT 80 MG/1
80 TABLET, FILM COATED ORAL DAILY
Status: ON HOLD | COMMUNITY
End: 2020-01-01 | Stop reason: HOSPADM

## 2020-01-01 RX ORDER — 0.9 % SODIUM CHLORIDE 0.9 %
500 INTRAVENOUS SOLUTION INTRAVENOUS ONCE
Status: COMPLETED | OUTPATIENT
Start: 2020-01-01 | End: 2020-01-01

## 2020-01-01 RX ORDER — LANSOPRAZOLE
30 KIT
Status: CANCELLED | OUTPATIENT
Start: 2020-01-01

## 2020-01-01 RX ORDER — SENNA PLUS 8.6 MG/1
1 TABLET ORAL NIGHTLY
Status: CANCELLED | OUTPATIENT
Start: 2020-01-01

## 2020-01-01 RX ORDER — AMOXICILLIN AND CLAVULANATE POTASSIUM 875; 125 MG/1; MG/1
1 TABLET, FILM COATED ORAL 2 TIMES DAILY
Qty: 14 TABLET | Refills: 0 | Status: SHIPPED | OUTPATIENT
Start: 2020-01-01 | End: 2020-01-01

## 2020-01-01 RX ORDER — SODIUM CHLORIDE, SODIUM LACTATE, POTASSIUM CHLORIDE, CALCIUM CHLORIDE 600; 310; 30; 20 MG/100ML; MG/100ML; MG/100ML; MG/100ML
1000 INJECTION, SOLUTION INTRAVENOUS CONTINUOUS
Status: DISCONTINUED | OUTPATIENT
Start: 2020-01-01 | End: 2020-01-01

## 2020-01-01 RX ORDER — SODIUM CHLORIDE 0.9 % (FLUSH) 0.9 %
10 SYRINGE (ML) INJECTION PRN
Status: DISCONTINUED | OUTPATIENT
Start: 2020-01-01 | End: 2020-01-01

## 2020-01-01 RX ORDER — HEPARIN SODIUM (PORCINE) LOCK FLUSH IV SOLN 100 UNIT/ML 100 UNIT/ML
3 SOLUTION INTRAVENOUS PRN
Status: CANCELLED | OUTPATIENT
Start: 2020-01-01

## 2020-01-01 RX ORDER — 0.9 % SODIUM CHLORIDE 0.9 %
1000 INTRAVENOUS SOLUTION INTRAVENOUS ONCE
Status: COMPLETED | OUTPATIENT
Start: 2020-01-01 | End: 2020-01-01

## 2020-01-01 RX ORDER — DIPHENHYDRAMINE HYDROCHLORIDE 50 MG/ML
25 INJECTION INTRAMUSCULAR; INTRAVENOUS ONCE
Status: COMPLETED | OUTPATIENT
Start: 2020-01-01 | End: 2020-01-01

## 2020-01-01 RX ORDER — FLUTICASONE PROPIONATE 50 MCG
SPRAY, SUSPENSION (ML) NASAL
Qty: 1 BOTTLE | Refills: 0 | Status: SHIPPED | OUTPATIENT
Start: 2020-01-01 | End: 2020-01-01 | Stop reason: ALTCHOICE

## 2020-01-01 RX ORDER — LABETALOL HYDROCHLORIDE 5 MG/ML
10 INJECTION, SOLUTION INTRAVENOUS EVERY 4 HOURS PRN
Status: CANCELLED | OUTPATIENT
Start: 2020-01-01

## 2020-01-01 RX ORDER — DIPHENHYDRAMINE HCL 25 MG
25 TABLET ORAL ONCE
Status: DISCONTINUED | OUTPATIENT
Start: 2020-01-01 | End: 2020-01-01 | Stop reason: SDUPTHER

## 2020-01-01 RX ORDER — ASPIRIN 81 MG/1
81 TABLET, CHEWABLE ORAL DAILY
Status: ON HOLD | COMMUNITY
End: 2020-01-01 | Stop reason: HOSPADM

## 2020-01-01 RX ORDER — MORPHINE SULFATE 2 MG/ML
2 INJECTION, SOLUTION INTRAMUSCULAR; INTRAVENOUS
Status: DISCONTINUED | OUTPATIENT
Start: 2020-01-01 | End: 2020-01-01 | Stop reason: HOSPADM

## 2020-01-01 RX ORDER — HEPARIN SODIUM 10000 [USP'U]/ML
5000 INJECTION, SOLUTION INTRAVENOUS; SUBCUTANEOUS EVERY 8 HOURS SCHEDULED
Status: DISCONTINUED | OUTPATIENT
Start: 2020-01-01 | End: 2020-01-01 | Stop reason: HOSPADM

## 2020-01-01 RX ORDER — ACETAMINOPHEN 325 MG/1
650 TABLET ORAL EVERY 4 HOURS PRN
Status: ON HOLD | COMMUNITY
End: 2020-01-01 | Stop reason: HOSPADM

## 2020-01-01 RX ORDER — MORPHINE SULFATE 100 MG/5ML
10 SOLUTION ORAL
Qty: 30 ML | Refills: 0 | Status: SHIPPED | OUTPATIENT
Start: 2020-01-01 | End: 2021-01-01

## 2020-01-01 RX ORDER — CEFAZOLIN SODIUM 1 G/3ML
INJECTION, POWDER, FOR SOLUTION INTRAMUSCULAR; INTRAVENOUS PRN
Status: DISCONTINUED | OUTPATIENT
Start: 2020-01-01 | End: 2020-01-01 | Stop reason: SDUPTHER

## 2020-01-01 RX ORDER — POLYETHYLENE GLYCOL 3350 17 G/17G
17 POWDER, FOR SOLUTION ORAL DAILY
Status: DISCONTINUED | OUTPATIENT
Start: 2020-01-01 | End: 2020-01-01 | Stop reason: HOSPADM

## 2020-01-01 RX ORDER — PETROLATUM 42 G/100G
OINTMENT TOPICAL 2 TIMES DAILY PRN
Status: DISCONTINUED | OUTPATIENT
Start: 2020-01-01 | End: 2020-01-01 | Stop reason: HOSPADM

## 2020-01-01 RX ORDER — 3% SODIUM CHLORIDE 3 G/100ML
125 INJECTION, SOLUTION INTRAVENOUS CONTINUOUS
Status: DISCONTINUED | OUTPATIENT
Start: 2020-01-01 | End: 2020-01-01

## 2020-01-01 RX ORDER — SODIUM CHLORIDE 0.9 % (FLUSH) 0.9 %
10 SYRINGE (ML) INJECTION EVERY 12 HOURS SCHEDULED
Status: DISCONTINUED | OUTPATIENT
Start: 2020-01-01 | End: 2020-01-01 | Stop reason: HOSPADM

## 2020-01-01 RX ORDER — ACETAMINOPHEN 650 MG/1
650 SUPPOSITORY RECTAL EVERY 6 HOURS PRN
Status: DISCONTINUED | OUTPATIENT
Start: 2020-01-01 | End: 2020-01-01 | Stop reason: HOSPADM

## 2020-01-01 RX ORDER — GLYCOPYRROLATE 0.2 MG/ML
0.2 INJECTION INTRAMUSCULAR; INTRAVENOUS ONCE
Status: COMPLETED | OUTPATIENT
Start: 2020-01-01 | End: 2020-01-01

## 2020-01-01 RX ORDER — ASPIRIN 81 MG/1
81 TABLET, CHEWABLE ORAL DAILY
Status: CANCELLED | OUTPATIENT
Start: 2020-01-01

## 2020-01-01 RX ORDER — SODIUM CHLORIDE 9 MG/ML
INJECTION, SOLUTION INTRAVENOUS CONTINUOUS PRN
Status: DISCONTINUED | OUTPATIENT
Start: 2020-01-01 | End: 2020-01-01 | Stop reason: SDUPTHER

## 2020-01-01 RX ORDER — SODIUM CHLORIDE 0.9 % (FLUSH) 0.9 %
10 SYRINGE (ML) INJECTION EVERY 12 HOURS SCHEDULED
Status: CANCELLED | OUTPATIENT
Start: 2020-01-01

## 2020-01-01 RX ORDER — LORAZEPAM 2 MG/ML
1 INJECTION INTRAMUSCULAR EVERY 4 HOURS PRN
Status: DISCONTINUED | OUTPATIENT
Start: 2020-01-01 | End: 2020-01-01 | Stop reason: HOSPADM

## 2020-01-01 RX ORDER — FEBUXOSTAT 80 MG/1
80 TABLET, FILM COATED ORAL DAILY
Status: CANCELLED | OUTPATIENT
Start: 2020-01-01

## 2020-01-01 RX ORDER — ATORVASTATIN CALCIUM 40 MG/1
40 TABLET, FILM COATED ORAL NIGHTLY
Status: DISCONTINUED | OUTPATIENT
Start: 2020-01-01 | End: 2020-01-01 | Stop reason: HOSPADM

## 2020-01-01 RX ORDER — 3% SODIUM CHLORIDE 3 G/100ML
25 INJECTION, SOLUTION INTRAVENOUS CONTINUOUS
Status: DISCONTINUED | OUTPATIENT
Start: 2020-01-01 | End: 2020-01-01

## 2020-01-01 RX ORDER — ACETAMINOPHEN 325 MG/1
650 TABLET ORAL EVERY 6 HOURS PRN
Status: DISCONTINUED | OUTPATIENT
Start: 2020-01-01 | End: 2020-01-01 | Stop reason: HOSPADM

## 2020-01-01 RX ORDER — LACTULOSE 10 G/15ML
20 SOLUTION ORAL 3 TIMES DAILY
Status: DISCONTINUED | OUTPATIENT
Start: 2020-01-01 | End: 2020-01-01 | Stop reason: HOSPADM

## 2020-01-01 RX ORDER — VALACYCLOVIR HYDROCHLORIDE 1 G/1
1000 TABLET, FILM COATED ORAL DAILY
Status: ON HOLD | COMMUNITY
End: 2020-01-01 | Stop reason: HOSPADM

## 2020-01-01 RX ORDER — 3% SODIUM CHLORIDE 3 G/100ML
75 INJECTION, SOLUTION INTRAVENOUS CONTINUOUS
Status: DISCONTINUED | OUTPATIENT
Start: 2020-01-01 | End: 2020-01-01

## 2020-01-01 RX ORDER — ONDANSETRON 2 MG/ML
4 INJECTION INTRAMUSCULAR; INTRAVENOUS EVERY 6 HOURS PRN
Status: DISCONTINUED | OUTPATIENT
Start: 2020-01-01 | End: 2020-01-01 | Stop reason: HOSPADM

## 2020-01-01 RX ORDER — ACETAMINOPHEN 325 MG/1
650 TABLET ORAL EVERY 4 HOURS PRN
Status: DISCONTINUED | OUTPATIENT
Start: 2020-01-01 | End: 2020-01-01 | Stop reason: SDUPTHER

## 2020-01-01 RX ORDER — SODIUM CHLORIDE 9 MG/ML
INJECTION, SOLUTION INTRAVENOUS CONTINUOUS
Status: DISCONTINUED | OUTPATIENT
Start: 2020-01-01 | End: 2020-01-01

## 2020-01-01 RX ORDER — LABETALOL HYDROCHLORIDE 5 MG/ML
10 INJECTION, SOLUTION INTRAVENOUS EVERY 10 MIN PRN
Status: DISCONTINUED | OUTPATIENT
Start: 2020-01-01 | End: 2020-01-01

## 2020-01-01 RX ORDER — LEVOTHYROXINE SODIUM 0.05 MG/1
50 TABLET ORAL DAILY
Status: ON HOLD | COMMUNITY
End: 2020-01-01 | Stop reason: HOSPADM

## 2020-01-01 RX ORDER — HYDRALAZINE HYDROCHLORIDE 20 MG/ML
10 INJECTION INTRAMUSCULAR; INTRAVENOUS EVERY 4 HOURS PRN
Status: DISCONTINUED | OUTPATIENT
Start: 2020-01-01 | End: 2020-01-01 | Stop reason: HOSPADM

## 2020-01-01 RX ORDER — HEPARIN SODIUM (PORCINE) LOCK FLUSH IV SOLN 100 UNIT/ML 100 UNIT/ML
1 SOLUTION INTRAVENOUS EVERY 12 HOURS SCHEDULED
Status: DISCONTINUED | OUTPATIENT
Start: 2020-01-01 | End: 2020-01-01 | Stop reason: HOSPADM

## 2020-01-01 RX ORDER — LACTULOSE 10 G/15ML
20 SOLUTION ORAL 3 TIMES DAILY
Status: CANCELLED | OUTPATIENT
Start: 2020-01-01

## 2020-01-01 RX ORDER — DEXAMETHASONE SODIUM PHOSPHATE 10 MG/ML
6 INJECTION INTRAMUSCULAR; INTRAVENOUS ONCE
Status: COMPLETED | OUTPATIENT
Start: 2020-01-01 | End: 2020-01-01

## 2020-01-01 RX ORDER — LIDOCAINE HYDROCHLORIDE 10 MG/ML
5 INJECTION, SOLUTION EPIDURAL; INFILTRATION; INTRACAUDAL; PERINEURAL ONCE
Status: DISCONTINUED | OUTPATIENT
Start: 2020-01-01 | End: 2020-01-01

## 2020-01-01 RX ORDER — BISACODYL 10 MG
10 SUPPOSITORY, RECTAL RECTAL DAILY
Status: CANCELLED | OUTPATIENT
Start: 2020-01-01

## 2020-01-01 RX ORDER — FEBUXOSTAT 80 MG/1
80 TABLET, FILM COATED ORAL DAILY
Status: DISCONTINUED | OUTPATIENT
Start: 2020-01-01 | End: 2020-01-01 | Stop reason: CLARIF

## 2020-01-01 RX ORDER — AMLODIPINE BESYLATE 10 MG/1
10 TABLET ORAL DAILY
Status: DISCONTINUED | OUTPATIENT
Start: 2020-01-01 | End: 2020-01-01 | Stop reason: HOSPADM

## 2020-01-01 RX ORDER — DRONABINOL 2.5 MG/1
5 CAPSULE ORAL 2 TIMES DAILY
Status: DISCONTINUED | OUTPATIENT
Start: 2020-01-01 | End: 2020-01-01 | Stop reason: HOSPADM

## 2020-01-01 RX ORDER — ASPIRIN 300 MG/1
300 SUPPOSITORY RECTAL ONCE
Status: COMPLETED | OUTPATIENT
Start: 2020-01-01 | End: 2020-01-01

## 2020-01-01 RX ORDER — HEPARIN SODIUM (PORCINE) LOCK FLUSH IV SOLN 100 UNIT/ML 100 UNIT/ML
3 SOLUTION INTRAVENOUS PRN
Status: DISCONTINUED | OUTPATIENT
Start: 2020-01-01 | End: 2020-01-01 | Stop reason: HOSPADM

## 2020-01-01 RX ORDER — LABETALOL HYDROCHLORIDE 5 MG/ML
INJECTION, SOLUTION INTRAVENOUS PRN
Status: DISCONTINUED | OUTPATIENT
Start: 2020-01-01 | End: 2020-01-01 | Stop reason: SDUPTHER

## 2020-01-01 RX ORDER — LANSOPRAZOLE
30 KIT
Status: DISCONTINUED | OUTPATIENT
Start: 2020-01-01 | End: 2020-01-01 | Stop reason: HOSPADM

## 2020-01-01 RX ORDER — POLYETHYLENE GLYCOL 3350 17 G/17G
17 POWDER, FOR SOLUTION ORAL DAILY
Status: CANCELLED | OUTPATIENT
Start: 2020-01-01

## 2020-01-01 RX ORDER — HEPARIN SODIUM (PORCINE) LOCK FLUSH IV SOLN 100 UNIT/ML 100 UNIT/ML
100 SOLUTION INTRAVENOUS PRN
Status: DISCONTINUED | OUTPATIENT
Start: 2020-01-01 | End: 2020-01-01 | Stop reason: HOSPADM

## 2020-01-01 RX ORDER — LANSOPRAZOLE
15 KIT
Status: DISCONTINUED | OUTPATIENT
Start: 2020-01-01 | End: 2020-01-01

## 2020-01-01 RX ORDER — ASPIRIN 81 MG/1
81 TABLET, CHEWABLE ORAL DAILY
Status: DISCONTINUED | OUTPATIENT
Start: 2020-01-01 | End: 2020-01-01 | Stop reason: HOSPADM

## 2020-01-01 RX ORDER — ATORVASTATIN CALCIUM 40 MG/1
40 TABLET, FILM COATED ORAL NIGHTLY
Status: CANCELLED | OUTPATIENT
Start: 2020-01-01

## 2020-01-01 RX ORDER — ACETAMINOPHEN 650 MG/1
650 SUPPOSITORY RECTAL ONCE
Status: COMPLETED | OUTPATIENT
Start: 2020-01-01 | End: 2020-01-01

## 2020-01-01 RX ORDER — TRAMADOL HYDROCHLORIDE 50 MG/1
50 TABLET ORAL EVERY 4 HOURS PRN
Status: DISCONTINUED | OUTPATIENT
Start: 2020-01-01 | End: 2020-01-01 | Stop reason: HOSPADM

## 2020-01-01 RX ORDER — POLYETHYLENE GLYCOL 3350 17 G/17G
17 POWDER, FOR SOLUTION ORAL DAILY PRN
Status: DISCONTINUED | OUTPATIENT
Start: 2020-01-01 | End: 2020-01-01

## 2020-01-01 RX ORDER — TRAMADOL HYDROCHLORIDE 50 MG/1
50 TABLET ORAL EVERY 4 HOURS PRN
Status: ON HOLD | COMMUNITY
End: 2020-01-01 | Stop reason: HOSPADM

## 2020-01-01 RX ORDER — HYDRALAZINE HYDROCHLORIDE 20 MG/ML
10 INJECTION INTRAMUSCULAR; INTRAVENOUS EVERY 4 HOURS PRN
Status: CANCELLED | OUTPATIENT
Start: 2020-01-01

## 2020-01-01 RX ORDER — DEXTROSE MONOHYDRATE 50 MG/ML
INJECTION, SOLUTION INTRAVENOUS CONTINUOUS
Status: DISCONTINUED | OUTPATIENT
Start: 2020-01-01 | End: 2020-01-01 | Stop reason: HOSPADM

## 2020-01-01 RX ORDER — FEBUXOSTAT 80 MG/1
80 TABLET, FILM COATED ORAL DAILY
Status: DISCONTINUED | OUTPATIENT
Start: 2020-01-01 | End: 2020-01-01 | Stop reason: HOSPADM

## 2020-01-01 RX ORDER — BISACODYL 10 MG
10 SUPPOSITORY, RECTAL RECTAL DAILY PRN
Status: DISCONTINUED | OUTPATIENT
Start: 2020-01-01 | End: 2020-01-01 | Stop reason: HOSPADM

## 2020-01-01 RX ORDER — HEPARIN SODIUM (PORCINE) LOCK FLUSH IV SOLN 100 UNIT/ML 100 UNIT/ML
1 SOLUTION INTRAVENOUS PRN
Status: DISCONTINUED | OUTPATIENT
Start: 2020-01-01 | End: 2020-01-01 | Stop reason: HOSPADM

## 2020-01-01 RX ORDER — SODIUM CHLORIDE 0.9 % (FLUSH) 0.9 %
10 SYRINGE (ML) INJECTION
Status: DISCONTINUED | OUTPATIENT
Start: 2020-01-01 | End: 2020-01-01

## 2020-01-01 RX ORDER — BISACODYL 10 MG
10 SUPPOSITORY, RECTAL RECTAL DAILY PRN
Status: ON HOLD | COMMUNITY
End: 2020-01-01 | Stop reason: HOSPADM

## 2020-01-01 RX ORDER — HYDRALAZINE HYDROCHLORIDE 20 MG/ML
10 INJECTION INTRAMUSCULAR; INTRAVENOUS EVERY 10 MIN PRN
Status: DISCONTINUED | OUTPATIENT
Start: 2020-01-01 | End: 2020-01-01

## 2020-01-01 RX ORDER — COLCHICINE 0.6 MG/1
0.6 TABLET ORAL DAILY
Status: ON HOLD | COMMUNITY
End: 2020-01-01 | Stop reason: HOSPADM

## 2020-01-01 RX ORDER — 3% SODIUM CHLORIDE 3 G/100ML
50 INJECTION, SOLUTION INTRAVENOUS CONTINUOUS
Status: DISCONTINUED | OUTPATIENT
Start: 2020-01-01 | End: 2020-01-01

## 2020-01-01 RX ORDER — ONDANSETRON 2 MG/ML
4 INJECTION INTRAMUSCULAR; INTRAVENOUS EVERY 6 HOURS PRN
Status: CANCELLED | OUTPATIENT
Start: 2020-01-01

## 2020-01-01 RX ORDER — ATORVASTATIN CALCIUM 40 MG/1
40 TABLET, FILM COATED ORAL NIGHTLY
Status: DISCONTINUED | OUTPATIENT
Start: 2020-01-01 | End: 2020-01-01

## 2020-01-01 RX ORDER — 3% SODIUM CHLORIDE 3 G/100ML
50 INJECTION, SOLUTION INTRAVENOUS ONCE
Status: COMPLETED | OUTPATIENT
Start: 2020-01-01 | End: 2020-01-01

## 2020-01-01 RX ORDER — PROPOFOL 10 MG/ML
INJECTION, EMULSION INTRAVENOUS PRN
Status: DISCONTINUED | OUTPATIENT
Start: 2020-01-01 | End: 2020-01-01 | Stop reason: SDUPTHER

## 2020-01-01 RX ORDER — LABETALOL HYDROCHLORIDE 5 MG/ML
10 INJECTION, SOLUTION INTRAVENOUS EVERY 4 HOURS PRN
Status: DISCONTINUED | OUTPATIENT
Start: 2020-01-01 | End: 2020-01-01 | Stop reason: HOSPADM

## 2020-01-01 RX ORDER — SODIUM CHLORIDE 450 MG/100ML
INJECTION, SOLUTION INTRAVENOUS CONTINUOUS
Status: DISCONTINUED | OUTPATIENT
Start: 2020-01-01 | End: 2020-01-01

## 2020-01-01 RX ORDER — LIDOCAINE HYDROCHLORIDE 10 MG/ML
5 INJECTION, SOLUTION INFILTRATION; PERINEURAL ONCE
Status: DISCONTINUED | OUTPATIENT
Start: 2020-01-01 | End: 2020-01-01 | Stop reason: HOSPADM

## 2020-01-01 RX ORDER — HEPARIN SODIUM 10000 [USP'U]/ML
5000 INJECTION, SOLUTION INTRAVENOUS; SUBCUTANEOUS EVERY 8 HOURS
Status: CANCELLED | OUTPATIENT
Start: 2020-01-01

## 2020-01-01 RX ORDER — 3% SODIUM CHLORIDE 3 G/100ML
100 INJECTION, SOLUTION INTRAVENOUS CONTINUOUS
Status: DISPENSED | OUTPATIENT
Start: 2020-01-01 | End: 2020-01-01

## 2020-01-01 RX ORDER — COLCHICINE 0.6 MG/1
0.6 TABLET ORAL DAILY
Status: CANCELLED | OUTPATIENT
Start: 2020-01-01

## 2020-01-01 RX ORDER — PETROLATUM 42 G/100G
OINTMENT TOPICAL 2 TIMES DAILY
Status: DISCONTINUED | OUTPATIENT
Start: 2020-01-01 | End: 2020-01-01 | Stop reason: HOSPADM

## 2020-01-01 RX ORDER — LEVOTHYROXINE SODIUM 0.05 MG/1
50 TABLET ORAL DAILY
Status: CANCELLED | OUTPATIENT
Start: 2020-01-01

## 2020-01-01 RX ORDER — PETROLATUM 42 G/100G
OINTMENT TOPICAL 3 TIMES DAILY PRN
Status: DISCONTINUED | OUTPATIENT
Start: 2020-01-01 | End: 2020-01-01 | Stop reason: HOSPADM

## 2020-01-01 RX ADMIN — Medication 10 ML: at 22:39

## 2020-01-01 RX ADMIN — ACYCLOVIR 400 MG: 200 SUSPENSION ORAL at 21:35

## 2020-01-01 RX ADMIN — ACYCLOVIR 400 MG: 200 SUSPENSION ORAL at 14:47

## 2020-01-01 RX ADMIN — HEPARIN SODIUM 5000 UNITS: 10000 INJECTION INTRAVENOUS; SUBCUTANEOUS at 23:35

## 2020-01-01 RX ADMIN — FEBUXOSTAT 80 MG: 80 TABLET, FILM COATED ORAL at 08:03

## 2020-01-01 RX ADMIN — ACYCLOVIR 400 MG: 200 SUSPENSION ORAL at 14:45

## 2020-01-01 RX ADMIN — BISACODYL 10 MG: 10 SUPPOSITORY RECTAL at 09:37

## 2020-01-01 RX ADMIN — POLYETHYLENE GLYCOL 3350 17 G: 17 POWDER, FOR SOLUTION ORAL at 09:56

## 2020-01-01 RX ADMIN — Medication 10 ML: at 10:32

## 2020-01-01 RX ADMIN — BISACODYL 10 MG: 10 SUPPOSITORY RECTAL at 09:46

## 2020-01-01 RX ADMIN — SODIUM CHLORIDE: 9 INJECTION, SOLUTION INTRAVENOUS at 18:33

## 2020-01-01 RX ADMIN — PETROLATUM: 42 OINTMENT TOPICAL at 20:36

## 2020-01-01 RX ADMIN — CEFEPIME HYDROCHLORIDE 1 G: 1 INJECTION, POWDER, FOR SOLUTION INTRAMUSCULAR; INTRAVENOUS at 12:05

## 2020-01-01 RX ADMIN — HEPARIN SODIUM 5000 UNITS: 10000 INJECTION INTRAVENOUS; SUBCUTANEOUS at 16:47

## 2020-01-01 RX ADMIN — COLCHICINE 0.6 MG: 0.6 TABLET, FILM COATED ORAL at 07:36

## 2020-01-01 RX ADMIN — ASPIRIN 81 MG: 81 TABLET, CHEWABLE ORAL at 10:38

## 2020-01-01 RX ADMIN — ATORVASTATIN CALCIUM 40 MG: 40 TABLET, FILM COATED ORAL at 20:10

## 2020-01-01 RX ADMIN — COLCHICINE 0.6 MG: 0.6 TABLET, FILM COATED ORAL at 09:57

## 2020-01-01 RX ADMIN — CEFTRIAXONE 1 G: 1 INJECTION, POWDER, FOR SOLUTION INTRAMUSCULAR; INTRAVENOUS at 20:21

## 2020-01-01 RX ADMIN — HEPARIN SODIUM 5000 UNITS: 10000 INJECTION INTRAVENOUS; SUBCUTANEOUS at 06:18

## 2020-01-01 RX ADMIN — COLCHICINE 0.6 MG: 0.6 TABLET, FILM COATED ORAL at 08:56

## 2020-01-01 RX ADMIN — ACETAMINOPHEN 650 MG: 325 TABLET ORAL at 17:37

## 2020-01-01 RX ADMIN — SODIUM CHLORIDE, PRESERVATIVE FREE 300 UNITS: 5 INJECTION INTRAVENOUS at 07:36

## 2020-01-01 RX ADMIN — POLYETHYLENE GLYCOL 3350 17 G: 17 POWDER, FOR SOLUTION ORAL at 09:40

## 2020-01-01 RX ADMIN — BARIUM SULFATE 15 ML: 400 SUSPENSION ORAL at 14:51

## 2020-01-01 RX ADMIN — SODIUM CHLORIDE 125 ML/HR: 3 INJECTION, SOLUTION INTRAVENOUS at 12:57

## 2020-01-01 RX ADMIN — SODIUM CHLORIDE, PRESERVATIVE FREE 300 UNITS: 5 INJECTION INTRAVENOUS at 10:10

## 2020-01-01 RX ADMIN — CEFTRIAXONE SODIUM 1 G: 1 INJECTION, POWDER, FOR SOLUTION INTRAMUSCULAR; INTRAVENOUS at 21:18

## 2020-01-01 RX ADMIN — ACYCLOVIR 400 MG: 200 SUSPENSION ORAL at 21:15

## 2020-01-01 RX ADMIN — FEBUXOSTAT 80 MG: 80 TABLET, FILM COATED ORAL at 09:36

## 2020-01-01 RX ADMIN — AMLODIPINE BESYLATE 10 MG: 10 TABLET ORAL at 09:43

## 2020-01-01 RX ADMIN — Medication 10 ML: at 09:58

## 2020-01-01 RX ADMIN — VANCOMYCIN HYDROCHLORIDE 1750 MG: 10 INJECTION, POWDER, LYOPHILIZED, FOR SOLUTION INTRAVENOUS at 13:17

## 2020-01-01 RX ADMIN — ACYCLOVIR 400 MG: 200 SUSPENSION ORAL at 21:26

## 2020-01-01 RX ADMIN — LABETALOL HYDROCHLORIDE 10 MG: 5 INJECTION INTRAVENOUS at 14:48

## 2020-01-01 RX ADMIN — Medication 10 ML: at 22:00

## 2020-01-01 RX ADMIN — Medication 10 ML: at 08:30

## 2020-01-01 RX ADMIN — SODIUM CHLORIDE, PRESERVATIVE FREE 300 UNITS: 5 INJECTION INTRAVENOUS at 21:15

## 2020-01-01 RX ADMIN — PETROLATUM: 42 OINTMENT TOPICAL at 20:20

## 2020-01-01 RX ADMIN — IOPAMIDOL 60 ML: 612 INJECTION, SOLUTION INTRATHECAL at 14:13

## 2020-01-01 RX ADMIN — SERTRALINE 50 MG: 50 TABLET, FILM COATED ORAL at 10:38

## 2020-01-01 RX ADMIN — HEPARIN SODIUM 5000 UNITS: 10000 INJECTION INTRAVENOUS; SUBCUTANEOUS at 23:11

## 2020-01-01 RX ADMIN — ACETAMINOPHEN 650 MG: 650 SUPPOSITORY RECTAL at 17:30

## 2020-01-01 RX ADMIN — NICARDIPINE HYDROCHLORIDE 5 MG/HR: 2.5 INJECTION, SOLUTION INTRAVENOUS at 18:13

## 2020-01-01 RX ADMIN — Medication 10 ML: at 20:37

## 2020-01-01 RX ADMIN — HEPARIN SODIUM 5000 UNITS: 10000 INJECTION INTRAVENOUS; SUBCUTANEOUS at 13:50

## 2020-01-01 RX ADMIN — FENTANYL CITRATE 50 MCG: 50 INJECTION, SOLUTION INTRAMUSCULAR; INTRAVENOUS at 13:23

## 2020-01-01 RX ADMIN — HEPARIN SODIUM 5000 UNITS: 10000 INJECTION INTRAVENOUS; SUBCUTANEOUS at 08:47

## 2020-01-01 RX ADMIN — Medication 10 ML: at 09:00

## 2020-01-01 RX ADMIN — LABETALOL HYDROCHLORIDE 10 MG: 5 INJECTION INTRAVENOUS at 17:07

## 2020-01-01 RX ADMIN — ASPIRIN 81 MG CHEWABLE TABLET 81 MG: 81 TABLET CHEWABLE at 10:08

## 2020-01-01 RX ADMIN — LEVOTHYROXINE SODIUM 50 MCG: 0.05 TABLET ORAL at 05:58

## 2020-01-01 RX ADMIN — SENNOSIDES 8.6 MG: 8.6 TABLET, FILM COATED ORAL at 19:40

## 2020-01-01 RX ADMIN — POTASSIUM PHOSPHATE, MONOBASIC AND POTASSIUM PHOSPHATE, DIBASIC 30 MMOL: 224; 236 INJECTION, SOLUTION, CONCENTRATE INTRAVENOUS at 08:33

## 2020-01-01 RX ADMIN — HYDRALAZINE HYDROCHLORIDE 10 MG: 20 INJECTION, SOLUTION INTRAMUSCULAR; INTRAVENOUS at 05:23

## 2020-01-01 RX ADMIN — ACYCLOVIR 400 MG: 200 SUSPENSION ORAL at 09:46

## 2020-01-01 RX ADMIN — POLYETHYLENE GLYCOL 3350 17 G: 17 POWDER, FOR SOLUTION ORAL at 07:36

## 2020-01-01 RX ADMIN — SODIUM CHLORIDE 50 ML/HR: 3 INJECTION, SOLUTION INTRAVENOUS at 15:22

## 2020-01-01 RX ADMIN — VANCOMYCIN HYDROCHLORIDE 1000 MG: 1 INJECTION, POWDER, LYOPHILIZED, FOR SOLUTION INTRAVENOUS at 13:02

## 2020-01-01 RX ADMIN — HEPARIN SODIUM 5000 UNITS: 10000 INJECTION INTRAVENOUS; SUBCUTANEOUS at 15:02

## 2020-01-01 RX ADMIN — SODIUM CHLORIDE: 4.5 INJECTION, SOLUTION INTRAVENOUS at 03:00

## 2020-01-01 RX ADMIN — SODIUM CHLORIDE 1000 ML: 9 INJECTION, SOLUTION INTRAVENOUS at 17:30

## 2020-01-01 RX ADMIN — ASPIRIN 81 MG CHEWABLE TABLET 81 MG: 81 TABLET CHEWABLE at 08:34

## 2020-01-01 RX ADMIN — Medication 10 ML: at 21:06

## 2020-01-01 RX ADMIN — Medication 10 ML: at 22:13

## 2020-01-01 RX ADMIN — Medication 10 ML: at 20:20

## 2020-01-01 RX ADMIN — SODIUM CHLORIDE, PRESERVATIVE FREE 300 UNITS: 5 INJECTION INTRAVENOUS at 21:28

## 2020-01-01 RX ADMIN — Medication 10 ML: at 20:48

## 2020-01-01 RX ADMIN — LEVOTHYROXINE SODIUM 50 MCG: 0.05 TABLET ORAL at 06:48

## 2020-01-01 RX ADMIN — PETROLATUM: 42 OINTMENT TOPICAL at 12:38

## 2020-01-01 RX ADMIN — MORPHINE SULFATE 2 MG: 2 INJECTION, SOLUTION INTRAMUSCULAR; INTRAVENOUS at 16:43

## 2020-01-01 RX ADMIN — HYDRALAZINE HYDROCHLORIDE 10 MG: 20 INJECTION, SOLUTION INTRAMUSCULAR; INTRAVENOUS at 23:25

## 2020-01-01 RX ADMIN — MEROPENEM 500 MG: 500 INJECTION, POWDER, FOR SOLUTION INTRAVENOUS at 13:10

## 2020-01-01 RX ADMIN — ATORVASTATIN CALCIUM 40 MG: 80 TABLET, FILM COATED ORAL at 22:12

## 2020-01-01 RX ADMIN — LABETALOL HYDROCHLORIDE 10 MG: 5 INJECTION INTRAVENOUS at 00:35

## 2020-01-01 RX ADMIN — COLCHICINE 0.6 MG: 0.6 TABLET, FILM COATED ORAL at 09:40

## 2020-01-01 RX ADMIN — HEPARIN SODIUM 5000 UNITS: 10000 INJECTION INTRAVENOUS; SUBCUTANEOUS at 13:23

## 2020-01-01 RX ADMIN — ASPIRIN 81 MG CHEWABLE TABLET 81 MG: 81 TABLET CHEWABLE at 17:42

## 2020-01-01 RX ADMIN — Medication 10 ML: at 19:47

## 2020-01-01 RX ADMIN — CEFAZOLIN 2000 MG: 1 INJECTION, POWDER, FOR SOLUTION INTRAMUSCULAR; INTRAVENOUS at 13:23

## 2020-01-01 RX ADMIN — LEVOTHYROXINE SODIUM 50 MCG: 0.05 TABLET ORAL at 05:39

## 2020-01-01 RX ADMIN — COLCHICINE 0.6 MG: 0.6 TABLET, FILM COATED ORAL at 08:47

## 2020-01-01 RX ADMIN — POTASSIUM CHLORIDE 40 MEQ: 400 INJECTION, SOLUTION INTRAVENOUS at 20:48

## 2020-01-01 RX ADMIN — NICARDIPINE HYDROCHLORIDE 7.5 MG/HR: 2.5 INJECTION, SOLUTION INTRAVENOUS at 04:43

## 2020-01-01 RX ADMIN — SODIUM CHLORIDE: 4.5 INJECTION, SOLUTION INTRAVENOUS at 09:54

## 2020-01-01 RX ADMIN — LORAZEPAM 1 MG: 2 INJECTION INTRAMUSCULAR; INTRAVENOUS at 17:45

## 2020-01-01 RX ADMIN — COLCHICINE 0.6 MG: 0.6 TABLET, FILM COATED ORAL at 08:41

## 2020-01-01 RX ADMIN — POLYETHYLENE GLYCOL 3350 17 G: 17 POWDER, FOR SOLUTION ORAL at 09:44

## 2020-01-01 RX ADMIN — LABETALOL HYDROCHLORIDE 10 MG: 5 INJECTION INTRAVENOUS at 18:07

## 2020-01-01 RX ADMIN — HEPARIN SODIUM 5000 UNITS: 10000 INJECTION INTRAVENOUS; SUBCUTANEOUS at 06:38

## 2020-01-01 RX ADMIN — SENNOSIDES 8.6 MG: 8.6 TABLET, FILM COATED ORAL at 21:28

## 2020-01-01 RX ADMIN — LEVOTHYROXINE SODIUM 50 MCG: 0.05 TABLET ORAL at 06:02

## 2020-01-01 RX ADMIN — Medication 10 ML: at 20:08

## 2020-01-01 RX ADMIN — LACTULOSE 20 G: 10 SOLUTION ORAL at 14:09

## 2020-01-01 RX ADMIN — ASPIRIN 81 MG CHEWABLE TABLET 81 MG: 81 TABLET CHEWABLE at 08:29

## 2020-01-01 RX ADMIN — Medication 10 ML: at 21:15

## 2020-01-01 RX ADMIN — LABETALOL HYDROCHLORIDE 2.5 MG: 5 INJECTION INTRAVENOUS at 14:16

## 2020-01-01 RX ADMIN — LABETALOL HYDROCHLORIDE 10 MG: 5 INJECTION INTRAVENOUS at 15:26

## 2020-01-01 RX ADMIN — LABETALOL HYDROCHLORIDE 10 MG: 5 INJECTION INTRAVENOUS at 18:15

## 2020-01-01 RX ADMIN — Medication 2000 UNITS: at 10:38

## 2020-01-01 RX ADMIN — SODIUM CHLORIDE, PRESERVATIVE FREE 300 UNITS: 5 INJECTION INTRAVENOUS at 09:58

## 2020-01-01 RX ADMIN — Medication 10 ML: at 21:35

## 2020-01-01 RX ADMIN — Medication 1 MG: at 13:26

## 2020-01-01 RX ADMIN — LANSOPRAZOLE 30 MG: KIT at 05:39

## 2020-01-01 RX ADMIN — HEPARIN SODIUM 5000 UNITS: 10000 INJECTION INTRAVENOUS; SUBCUTANEOUS at 14:48

## 2020-01-01 RX ADMIN — POLYETHYLENE GLYCOL 3350 17 G: 17 POWDER, FOR SOLUTION ORAL at 16:01

## 2020-01-01 RX ADMIN — HEPARIN SODIUM 5000 UNITS: 10000 INJECTION INTRAVENOUS; SUBCUTANEOUS at 23:31

## 2020-01-01 RX ADMIN — SODIUM CHLORIDE 50 ML: 3 INJECTION, SOLUTION INTRAVENOUS at 12:08

## 2020-01-01 RX ADMIN — LACTULOSE 20 G: 10 SOLUTION ORAL at 21:26

## 2020-01-01 RX ADMIN — LABETALOL HYDROCHLORIDE 10 MG: 5 INJECTION INTRAVENOUS at 09:55

## 2020-01-01 RX ADMIN — FEBUXOSTAT 80 MG: 80 TABLET, FILM COATED ORAL at 08:33

## 2020-01-01 RX ADMIN — LACTULOSE 20 G: 10 SOLUTION ORAL at 10:10

## 2020-01-01 RX ADMIN — POLYETHYLENE GLYCOL 3350 17 G: 17 POWDER, FOR SOLUTION ORAL at 08:33

## 2020-01-01 RX ADMIN — SODIUM CHLORIDE, PRESERVATIVE FREE 300 UNITS: 5 INJECTION INTRAVENOUS at 09:00

## 2020-01-01 RX ADMIN — LEVOTHYROXINE SODIUM 50 MCG: 0.03 TABLET ORAL at 05:13

## 2020-01-01 RX ADMIN — HEPARIN SODIUM 5000 UNITS: 10000 INJECTION INTRAVENOUS; SUBCUTANEOUS at 05:27

## 2020-01-01 RX ADMIN — HEPARIN SODIUM 5000 UNITS: 10000 INJECTION INTRAVENOUS; SUBCUTANEOUS at 06:16

## 2020-01-01 RX ADMIN — SODIUM CHLORIDE, PRESERVATIVE FREE 300 UNITS: 5 INJECTION INTRAVENOUS at 20:08

## 2020-01-01 RX ADMIN — SODIUM CHLORIDE, PRESERVATIVE FREE 300 UNITS: 5 INJECTION INTRAVENOUS at 09:44

## 2020-01-01 RX ADMIN — NICARDIPINE HYDROCHLORIDE 7 MG/HR: 2.5 INJECTION, SOLUTION INTRAVENOUS at 19:04

## 2020-01-01 RX ADMIN — POLYETHYLENE GLYCOL 3350 17 G: 17 POWDER, FOR SOLUTION ORAL at 08:56

## 2020-01-01 RX ADMIN — HEPARIN SODIUM 5000 UNITS: 10000 INJECTION INTRAVENOUS; SUBCUTANEOUS at 15:13

## 2020-01-01 RX ADMIN — DEXAMETHASONE SODIUM PHOSPHATE 6 MG: 10 INJECTION INTRAMUSCULAR; INTRAVENOUS at 06:04

## 2020-01-01 RX ADMIN — AMLODIPINE BESYLATE 10 MG: 10 TABLET ORAL at 09:57

## 2020-01-01 RX ADMIN — COLCHICINE 0.6 MG: 0.6 TABLET, FILM COATED ORAL at 08:29

## 2020-01-01 RX ADMIN — LABETALOL HYDROCHLORIDE 10 MG: 5 INJECTION INTRAVENOUS at 08:19

## 2020-01-01 RX ADMIN — SENNOSIDES 8.6 MG: 8.6 TABLET, FILM COATED ORAL at 21:26

## 2020-01-01 RX ADMIN — ACYCLOVIR 400 MG: 200 SUSPENSION ORAL at 08:58

## 2020-01-01 RX ADMIN — LABETALOL HYDROCHLORIDE 10 MG: 5 INJECTION INTRAVENOUS at 16:06

## 2020-01-01 RX ADMIN — ACETAMINOPHEN 650 MG: 650 SUPPOSITORY RECTAL at 22:30

## 2020-01-01 RX ADMIN — ASPIRIN 81 MG CHEWABLE TABLET 81 MG: 81 TABLET CHEWABLE at 09:57

## 2020-01-01 RX ADMIN — LACTULOSE 20 G: 10 SOLUTION ORAL at 21:31

## 2020-01-01 RX ADMIN — FEBUXOSTAT 80 MG: 80 TABLET, FILM COATED ORAL at 08:41

## 2020-01-01 RX ADMIN — COLCHICINE 0.6 MG: 0.6 TABLET, FILM COATED ORAL at 08:34

## 2020-01-01 RX ADMIN — PETROLATUM: 42 OINTMENT TOPICAL at 22:18

## 2020-01-01 RX ADMIN — LEVOTHYROXINE SODIUM 50 MCG: 0.05 TABLET ORAL at 08:41

## 2020-01-01 RX ADMIN — BARIUM SULFATE 15 ML: 0.81 POWDER, FOR SUSPENSION ORAL at 14:51

## 2020-01-01 RX ADMIN — HEPARIN 100 UNITS: 100 SYRINGE at 13:43

## 2020-01-01 RX ADMIN — SODIUM CHLORIDE 75 ML/HR: 3 INJECTION, SOLUTION INTRAVENOUS at 17:00

## 2020-01-01 RX ADMIN — ASPIRIN 81 MG CHEWABLE TABLET 81 MG: 81 TABLET CHEWABLE at 08:47

## 2020-01-01 RX ADMIN — LABETALOL HYDROCHLORIDE 10 MG: 5 INJECTION INTRAVENOUS at 18:42

## 2020-01-01 RX ADMIN — POTASSIUM CHLORIDE 40 MEQ: 400 INJECTION, SOLUTION INTRAVENOUS at 08:46

## 2020-01-01 RX ADMIN — ACYCLOVIR 400 MG: 200 SUSPENSION ORAL at 20:09

## 2020-01-01 RX ADMIN — LACTULOSE 20 G: 10 SOLUTION ORAL at 09:59

## 2020-01-01 RX ADMIN — ATORVASTATIN CALCIUM 40 MG: 40 TABLET, FILM COATED ORAL at 21:27

## 2020-01-01 RX ADMIN — HEPARIN 300 UNITS: 100 SYRINGE at 06:36

## 2020-01-01 RX ADMIN — ASPIRIN 81 MG CHEWABLE TABLET 81 MG: 81 TABLET CHEWABLE at 08:46

## 2020-01-01 RX ADMIN — HEPARIN SODIUM 5000 UNITS: 10000 INJECTION INTRAVENOUS; SUBCUTANEOUS at 21:45

## 2020-01-01 RX ADMIN — FEBUXOSTAT 80 MG: 80 TABLET, FILM COATED ORAL at 09:45

## 2020-01-01 RX ADMIN — SODIUM CHLORIDE, PRESERVATIVE FREE 300 UNITS: 5 INJECTION INTRAVENOUS at 22:01

## 2020-01-01 RX ADMIN — SODIUM CHLORIDE 100 ML/HR: 3 INJECTION, SOLUTION INTRAVENOUS at 07:33

## 2020-01-01 RX ADMIN — ACYCLOVIR 400 MG: 200 SUSPENSION ORAL at 08:33

## 2020-01-01 RX ADMIN — ATORVASTATIN CALCIUM 40 MG: 40 TABLET, FILM COATED ORAL at 20:48

## 2020-01-01 RX ADMIN — LABETALOL HYDROCHLORIDE 10 MG: 5 INJECTION INTRAVENOUS at 17:25

## 2020-01-01 RX ADMIN — SODIUM CHLORIDE 100 ML/HR: 3 INJECTION, SOLUTION INTRAVENOUS at 14:55

## 2020-01-01 RX ADMIN — HEPARIN 100 UNITS: 100 SYRINGE at 20:37

## 2020-01-01 RX ADMIN — DEXTROSE MONOHYDRATE: 50 INJECTION, SOLUTION INTRAVENOUS at 13:43

## 2020-01-01 RX ADMIN — SODIUM CHLORIDE 50 ML/HR: 3 INJECTION, SOLUTION INTRAVENOUS at 23:20

## 2020-01-01 RX ADMIN — HEPARIN SODIUM 5000 UNITS: 10000 INJECTION INTRAVENOUS; SUBCUTANEOUS at 13:15

## 2020-01-01 RX ADMIN — LACTULOSE 20 G: 10 SOLUTION ORAL at 10:42

## 2020-01-01 RX ADMIN — POTASSIUM CHLORIDE 40 MEQ: 400 INJECTION, SOLUTION INTRAVENOUS at 16:50

## 2020-01-01 RX ADMIN — HEPARIN SODIUM 5000 UNITS: 10000 INJECTION INTRAVENOUS; SUBCUTANEOUS at 16:19

## 2020-01-01 RX ADMIN — MORPHINE SULFATE 2 MG: 2 INJECTION, SOLUTION INTRAMUSCULAR; INTRAVENOUS at 15:21

## 2020-01-01 RX ADMIN — LEVOTHYROXINE SODIUM 50 MCG: 0.05 TABLET ORAL at 06:05

## 2020-01-01 RX ADMIN — ONDANSETRON 4 MG: 2 INJECTION INTRAMUSCULAR; INTRAVENOUS at 16:43

## 2020-01-01 RX ADMIN — NICARDIPINE HYDROCHLORIDE 5 MG/HR: 2.5 INJECTION, SOLUTION INTRAVENOUS at 13:43

## 2020-01-01 RX ADMIN — LABETALOL HYDROCHLORIDE 10 MG: 5 INJECTION INTRAVENOUS at 18:28

## 2020-01-01 RX ADMIN — SODIUM CHLORIDE, PRESERVATIVE FREE 100 UNITS: 5 INJECTION INTRAVENOUS at 11:07

## 2020-01-01 RX ADMIN — SODIUM CHLORIDE 1000 ML: 9 INJECTION, SOLUTION INTRAVENOUS at 05:05

## 2020-01-01 RX ADMIN — ASPIRIN 81 MG CHEWABLE TABLET 81 MG: 81 TABLET CHEWABLE at 09:37

## 2020-01-01 RX ADMIN — LABETALOL HYDROCHLORIDE 10 MG: 5 INJECTION INTRAVENOUS at 14:38

## 2020-01-01 RX ADMIN — ATORVASTATIN CALCIUM 40 MG: 40 TABLET, FILM COATED ORAL at 21:15

## 2020-01-01 RX ADMIN — Medication 10 ML: at 10:10

## 2020-01-01 RX ADMIN — HEPARIN 100 UNITS: 100 SYRINGE at 20:32

## 2020-01-01 RX ADMIN — SODIUM CHLORIDE, PRESERVATIVE FREE 300 UNITS: 5 INJECTION INTRAVENOUS at 21:36

## 2020-01-01 RX ADMIN — HEPARIN SODIUM 5000 UNITS: 10000 INJECTION INTRAVENOUS; SUBCUTANEOUS at 21:35

## 2020-01-01 RX ADMIN — LEVOTHYROXINE SODIUM 50 MCG: 0.05 TABLET ORAL at 06:29

## 2020-01-01 RX ADMIN — LEVOTHYROXINE SODIUM 50 MCG: 0.03 TABLET ORAL at 06:12

## 2020-01-01 RX ADMIN — ACETAMINOPHEN 650 MG: 325 TABLET ORAL at 19:47

## 2020-01-01 RX ADMIN — ASPIRIN 300 MG: 300 SUPPOSITORY RECTAL at 10:10

## 2020-01-01 RX ADMIN — GLYCOPYRROLATE 0.2 MG: 0.2 INJECTION, SOLUTION INTRAMUSCULAR; INTRAVENOUS at 16:43

## 2020-01-01 RX ADMIN — HEPARIN SODIUM 5000 UNITS: 10000 INJECTION INTRAVENOUS; SUBCUTANEOUS at 14:52

## 2020-01-01 RX ADMIN — LACTULOSE 20 G: 10 SOLUTION ORAL at 08:34

## 2020-01-01 RX ADMIN — HEPARIN SODIUM 5000 UNITS: 10000 INJECTION INTRAVENOUS; SUBCUTANEOUS at 21:06

## 2020-01-01 RX ADMIN — Medication 10 ML: at 09:44

## 2020-01-01 RX ADMIN — COLCHICINE 0.6 MG: 0.6 TABLET, FILM COATED ORAL at 10:08

## 2020-01-01 RX ADMIN — PETROLATUM: 42 OINTMENT TOPICAL at 15:23

## 2020-01-01 RX ADMIN — PETROLATUM: 42 OINTMENT TOPICAL at 20:32

## 2020-01-01 RX ADMIN — LABETALOL HYDROCHLORIDE 10 MG: 5 INJECTION INTRAVENOUS at 19:06

## 2020-01-01 RX ADMIN — NICARDIPINE HYDROCHLORIDE 5 MG/HR: 2.5 INJECTION, SOLUTION INTRAVENOUS at 18:19

## 2020-01-01 RX ADMIN — HYDRALAZINE HYDROCHLORIDE 10 MG: 20 INJECTION, SOLUTION INTRAMUSCULAR; INTRAVENOUS at 17:35

## 2020-01-01 RX ADMIN — HEPARIN SODIUM 5000 UNITS: 10000 INJECTION INTRAVENOUS; SUBCUTANEOUS at 14:18

## 2020-01-01 RX ADMIN — NICARDIPINE HYDROCHLORIDE 7.5 MG/HR: 2.5 INJECTION, SOLUTION INTRAVENOUS at 21:17

## 2020-01-01 RX ADMIN — ACYCLOVIR 400 MG: 200 SUSPENSION ORAL at 09:37

## 2020-01-01 RX ADMIN — SODIUM CHLORIDE, PRESERVATIVE FREE 10 ML: 5 INJECTION INTRAVENOUS at 17:35

## 2020-01-01 RX ADMIN — HEPARIN SODIUM 5000 UNITS: 10000 INJECTION INTRAVENOUS; SUBCUTANEOUS at 00:00

## 2020-01-01 RX ADMIN — LEVOTHYROXINE SODIUM 50 MCG: 0.05 TABLET ORAL at 06:38

## 2020-01-01 RX ADMIN — PETROLATUM: 42 OINTMENT TOPICAL at 10:15

## 2020-01-01 RX ADMIN — NICARDIPINE HYDROCHLORIDE 6 MG/HR: 2.5 INJECTION, SOLUTION INTRAVENOUS at 01:24

## 2020-01-01 RX ADMIN — LANSOPRAZOLE 30 MG: KIT at 09:45

## 2020-01-01 RX ADMIN — POLYETHYLENE GLYCOL 3350 17 G: 17 POWDER, FOR SOLUTION ORAL at 09:37

## 2020-01-01 RX ADMIN — Medication 10 ML: at 21:28

## 2020-01-01 RX ADMIN — DEXTROSE MONOHYDRATE: 50 INJECTION, SOLUTION INTRAVENOUS at 02:11

## 2020-01-01 RX ADMIN — SODIUM CHLORIDE, PRESERVATIVE FREE 100 UNITS: 5 INJECTION INTRAVENOUS at 22:19

## 2020-01-01 RX ADMIN — HEPARIN SODIUM 5000 UNITS: 10000 INJECTION INTRAVENOUS; SUBCUTANEOUS at 07:01

## 2020-01-01 RX ADMIN — HEPARIN SODIUM 5000 UNITS: 10000 INJECTION INTRAVENOUS; SUBCUTANEOUS at 13:04

## 2020-01-01 RX ADMIN — SODIUM CHLORIDE: 9 INJECTION, SOLUTION INTRAVENOUS at 13:05

## 2020-01-01 RX ADMIN — AMLODIPINE BESYLATE 10 MG: 10 TABLET ORAL at 07:37

## 2020-01-01 RX ADMIN — DIPHENHYDRAMINE HYDROCHLORIDE 25 MG: 50 INJECTION, SOLUTION INTRAMUSCULAR; INTRAVENOUS at 22:30

## 2020-01-01 RX ADMIN — SODIUM CHLORIDE, PRESERVATIVE FREE 300 UNITS: 5 INJECTION INTRAVENOUS at 19:40

## 2020-01-01 RX ADMIN — AMLODIPINE BESYLATE 10 MG: 10 TABLET ORAL at 08:34

## 2020-01-01 RX ADMIN — SENNOSIDES 8.6 MG: 8.6 TABLET, FILM COATED ORAL at 21:35

## 2020-01-01 RX ADMIN — HEPARIN SODIUM 5000 UNITS: 10000 INJECTION INTRAVENOUS; SUBCUTANEOUS at 22:54

## 2020-01-01 RX ADMIN — LABETALOL HYDROCHLORIDE 10 MG: 5 INJECTION INTRAVENOUS at 23:50

## 2020-01-01 RX ADMIN — LABETALOL HYDROCHLORIDE 10 MG: 5 INJECTION INTRAVENOUS at 08:35

## 2020-01-01 RX ADMIN — SODIUM CHLORIDE, PRESERVATIVE FREE 300 UNITS: 5 INJECTION INTRAVENOUS at 08:33

## 2020-01-01 RX ADMIN — ASPIRIN 81 MG CHEWABLE TABLET 81 MG: 81 TABLET CHEWABLE at 08:03

## 2020-01-01 RX ADMIN — NICARDIPINE HYDROCHLORIDE 7 MG/HR: 2.5 INJECTION, SOLUTION INTRAVENOUS at 10:32

## 2020-01-01 RX ADMIN — BISACODYL 10 MG: 10 SUPPOSITORY RECTAL at 16:01

## 2020-01-01 RX ADMIN — HEPARIN SODIUM 5000 UNITS: 10000 INJECTION INTRAVENOUS; SUBCUTANEOUS at 23:50

## 2020-01-01 RX ADMIN — SODIUM CHLORIDE, PRESERVATIVE FREE 10 ML: 5 INJECTION INTRAVENOUS at 06:36

## 2020-01-01 RX ADMIN — LEVOTHYROXINE SODIUM 50 MCG: 0.05 TABLET ORAL at 05:30

## 2020-01-01 RX ADMIN — FEBUXOSTAT 80 MG: 80 TABLET, FILM COATED ORAL at 08:29

## 2020-01-01 RX ADMIN — FEBUXOSTAT 80 MG: 80 TABLET, FILM COATED ORAL at 09:56

## 2020-01-01 RX ADMIN — BARIUM SULFATE 15 ML: 400 PASTE ORAL at 14:50

## 2020-01-01 RX ADMIN — HEPARIN SODIUM 5000 UNITS: 10000 INJECTION INTRAVENOUS; SUBCUTANEOUS at 05:26

## 2020-01-01 RX ADMIN — SODIUM CHLORIDE, PRESERVATIVE FREE 100 UNITS: 5 INJECTION INTRAVENOUS at 09:25

## 2020-01-01 RX ADMIN — Medication 1 MG: at 13:22

## 2020-01-01 RX ADMIN — LEVOTHYROXINE SODIUM 50 MCG: 0.05 TABLET ORAL at 07:04

## 2020-01-01 RX ADMIN — VANCOMYCIN HYDROCHLORIDE 1250 MG: 10 INJECTION, POWDER, LYOPHILIZED, FOR SOLUTION INTRAVENOUS at 10:16

## 2020-01-01 RX ADMIN — LACTULOSE 20 G: 10 SOLUTION ORAL at 21:15

## 2020-01-01 RX ADMIN — LANSOPRAZOLE 30 MG: KIT at 06:48

## 2020-01-01 RX ADMIN — POTASSIUM BICARBONATE 20 MEQ: 782 TABLET, EFFERVESCENT ORAL at 16:01

## 2020-01-01 RX ADMIN — LABETALOL HYDROCHLORIDE 10 MG: 5 INJECTION INTRAVENOUS at 19:47

## 2020-01-01 RX ADMIN — SENNOSIDES 8.6 MG: 8.6 TABLET, FILM COATED ORAL at 21:14

## 2020-01-01 RX ADMIN — SODIUM CHLORIDE, PRESERVATIVE FREE 10 ML: 5 INJECTION INTRAVENOUS at 14:46

## 2020-01-01 RX ADMIN — PROPOFOL 420 MG: 10 INJECTION, EMULSION INTRAVENOUS at 13:19

## 2020-01-01 RX ADMIN — HEPARIN SODIUM 5000 UNITS: 10000 INJECTION INTRAVENOUS; SUBCUTANEOUS at 06:10

## 2020-01-01 RX ADMIN — LACTULOSE 20 G: 10 SOLUTION ORAL at 14:45

## 2020-01-01 RX ADMIN — HEPARIN SODIUM 5000 UNITS: 10000 INJECTION INTRAVENOUS; SUBCUTANEOUS at 05:36

## 2020-01-01 RX ADMIN — IOPAMIDOL 100 ML: 755 INJECTION, SOLUTION INTRAVENOUS at 12:30

## 2020-01-01 RX ADMIN — NICARDIPINE HYDROCHLORIDE 4 MG/HR: 2.5 INJECTION, SOLUTION INTRAVENOUS at 14:46

## 2020-01-01 RX ADMIN — Medication 10 ML: at 08:41

## 2020-01-01 RX ADMIN — HEPARIN SODIUM 5000 UNITS: 10000 INJECTION INTRAVENOUS; SUBCUTANEOUS at 23:16

## 2020-01-01 RX ADMIN — NICARDIPINE HYDROCHLORIDE 7.5 MG/HR: 2.5 INJECTION, SOLUTION INTRAVENOUS at 11:44

## 2020-01-01 RX ADMIN — ENOXAPARIN SODIUM 40 MG: 40 INJECTION SUBCUTANEOUS at 10:10

## 2020-01-01 RX ADMIN — SODIUM CHLORIDE: 4.5 INJECTION, SOLUTION INTRAVENOUS at 14:51

## 2020-01-01 RX ADMIN — SODIUM CHLORIDE 500 ML: 9 INJECTION, SOLUTION INTRAVENOUS at 13:20

## 2020-01-01 RX ADMIN — SODIUM CHLORIDE, PRESERVATIVE FREE 10 ML: 5 INJECTION INTRAVENOUS at 21:18

## 2020-01-01 RX ADMIN — MORPHINE SULFATE 2 MG: 2 INJECTION, SOLUTION INTRAMUSCULAR; INTRAVENOUS at 13:06

## 2020-01-01 RX ADMIN — ACYCLOVIR 400 MG: 200 SUSPENSION ORAL at 09:56

## 2020-01-01 RX ADMIN — Medication 10 ML: at 10:38

## 2020-01-01 RX ADMIN — FEBUXOSTAT 80 MG: 80 TABLET, FILM COATED ORAL at 08:47

## 2020-01-01 RX ADMIN — ACETAMINOPHEN 650 MG: 325 TABLET ORAL at 10:42

## 2020-01-01 RX ADMIN — COLCHICINE 0.6 MG: 0.6 TABLET, FILM COATED ORAL at 09:36

## 2020-01-01 RX ADMIN — ACYCLOVIR 400 MG: 200 SUSPENSION ORAL at 14:52

## 2020-01-01 RX ADMIN — HEPARIN SODIUM 5000 UNITS: 10000 INJECTION INTRAVENOUS; SUBCUTANEOUS at 18:14

## 2020-01-01 RX ADMIN — SODIUM CHLORIDE 75 ML/HR: 3 INJECTION, SOLUTION INTRAVENOUS at 09:09

## 2020-01-01 RX ADMIN — SODIUM CHLORIDE 100 ML/HR: 3 INJECTION, SOLUTION INTRAVENOUS at 09:54

## 2020-01-01 RX ADMIN — ATORVASTATIN CALCIUM 40 MG: 40 TABLET, FILM COATED ORAL at 21:35

## 2020-01-01 RX ADMIN — POTASSIUM BICARBONATE 20 MEQ: 782 TABLET, EFFERVESCENT ORAL at 07:26

## 2020-01-01 RX ADMIN — LANSOPRAZOLE 30 MG: KIT at 05:31

## 2020-01-01 RX ADMIN — ATORVASTATIN CALCIUM 40 MG: 40 TABLET, FILM COATED ORAL at 19:47

## 2020-01-01 RX ADMIN — PETROLATUM: 42 OINTMENT TOPICAL at 09:25

## 2020-01-01 RX ADMIN — ACETAMINOPHEN 650 MG: 325 TABLET ORAL at 10:10

## 2020-01-01 RX ADMIN — LABETALOL HYDROCHLORIDE 10 MG: 5 INJECTION INTRAVENOUS at 12:10

## 2020-01-01 RX ADMIN — LACTULOSE 20 G: 10 SOLUTION ORAL at 14:53

## 2020-01-01 RX ADMIN — HEPARIN SODIUM 5000 UNITS: 10000 INJECTION INTRAVENOUS; SUBCUTANEOUS at 15:07

## 2020-01-01 RX ADMIN — MEROPENEM 500 MG: 500 INJECTION, POWDER, FOR SOLUTION INTRAVENOUS at 17:05

## 2020-01-01 RX ADMIN — COLCHICINE 0.6 MG: 0.6 TABLET, FILM COATED ORAL at 09:07

## 2020-01-01 RX ADMIN — PETROLATUM: 42 OINTMENT TOPICAL at 01:21

## 2020-01-01 RX ADMIN — LABETALOL HYDROCHLORIDE 10 MG: 5 INJECTION INTRAVENOUS at 21:28

## 2020-01-01 RX ADMIN — PETROLATUM: 42 OINTMENT TOPICAL at 20:25

## 2020-01-01 RX ADMIN — ENOXAPARIN SODIUM 40 MG: 40 INJECTION SUBCUTANEOUS at 08:29

## 2020-01-01 RX ADMIN — LORAZEPAM 1 MG: 2 INJECTION INTRAMUSCULAR; INTRAVENOUS at 11:45

## 2020-01-01 RX ADMIN — SODIUM CHLORIDE, PRESERVATIVE FREE 10 ML: 5 INJECTION INTRAVENOUS at 15:27

## 2020-01-01 RX ADMIN — SODIUM CHLORIDE, POTASSIUM CHLORIDE, SODIUM LACTATE AND CALCIUM CHLORIDE 1000 ML: 600; 310; 30; 20 INJECTION, SOLUTION INTRAVENOUS at 09:29

## 2020-01-01 RX ADMIN — DEXTROSE MONOHYDRATE: 50 INJECTION, SOLUTION INTRAVENOUS at 20:32

## 2020-01-01 RX ADMIN — LACTULOSE 20 G: 10 SOLUTION ORAL at 09:45

## 2020-01-01 RX ADMIN — CEFEPIME HYDROCHLORIDE 1 G: 1 INJECTION, POWDER, FOR SOLUTION INTRAMUSCULAR; INTRAVENOUS at 22:30

## 2020-01-01 RX ADMIN — ATORVASTATIN CALCIUM 40 MG: 40 TABLET, FILM COATED ORAL at 20:30

## 2020-01-01 RX ADMIN — HEPARIN SODIUM 5000 UNITS: 10000 INJECTION INTRAVENOUS; SUBCUTANEOUS at 21:42

## 2020-01-01 RX ADMIN — ACYCLOVIR 400 MG: 200 SUSPENSION ORAL at 14:18

## 2020-01-01 RX ADMIN — Medication 1 MG: at 12:17

## 2020-01-01 RX ADMIN — ATORVASTATIN CALCIUM 40 MG: 40 TABLET, FILM COATED ORAL at 21:31

## 2020-01-01 RX ADMIN — Medication 10 ML: at 20:32

## 2020-01-01 RX ADMIN — SODIUM CHLORIDE, PRESERVATIVE FREE 300 UNITS: 5 INJECTION INTRAVENOUS at 22:00

## 2020-01-01 RX ADMIN — COLCHICINE 0.6 MG: 0.6 TABLET, FILM COATED ORAL at 17:41

## 2020-01-01 RX ADMIN — ACYCLOVIR 400 MG: 200 SUSPENSION ORAL at 10:07

## 2020-01-01 RX ADMIN — ASPIRIN 81 MG CHEWABLE TABLET 81 MG: 81 TABLET CHEWABLE at 09:43

## 2020-01-01 RX ADMIN — FEBUXOSTAT 80 MG: 80 TABLET, FILM COATED ORAL at 07:36

## 2020-01-01 RX ADMIN — DEXTROSE MONOHYDRATE: 50 INJECTION, SOLUTION INTRAVENOUS at 12:17

## 2020-01-01 RX ADMIN — ACYCLOVIR 400 MG: 200 SUSPENSION ORAL at 21:28

## 2020-01-01 RX ADMIN — ACETAMINOPHEN 650 MG: 325 TABLET ORAL at 20:48

## 2020-01-01 RX ADMIN — Medication 10 ML: at 20:30

## 2020-01-01 RX ADMIN — ATORVASTATIN CALCIUM 40 MG: 40 TABLET, FILM COATED ORAL at 19:40

## 2020-01-01 RX ADMIN — AMLODIPINE BESYLATE 10 MG: 10 TABLET ORAL at 10:07

## 2020-01-01 RX ADMIN — DEXAMETHASONE 3 MG: 1 TABLET ORAL at 10:38

## 2020-01-01 RX ADMIN — SODIUM CHLORIDE, POTASSIUM CHLORIDE, SODIUM LACTATE AND CALCIUM CHLORIDE 1000 ML: 600; 310; 30; 20 INJECTION, SOLUTION INTRAVENOUS at 06:04

## 2020-01-01 RX ADMIN — MORPHINE SULFATE 2 MG: 2 INJECTION, SOLUTION INTRAMUSCULAR; INTRAVENOUS at 17:45

## 2020-01-01 RX ADMIN — ASPIRIN 81 MG CHEWABLE TABLET 81 MG: 81 TABLET CHEWABLE at 07:36

## 2020-01-01 RX ADMIN — COLCHICINE 0.6 MG: 0.6 TABLET, FILM COATED ORAL at 08:03

## 2020-01-01 RX ADMIN — LACTULOSE 20 G: 10 SOLUTION ORAL at 14:47

## 2020-01-01 RX ADMIN — HEPARIN SODIUM 5000 UNITS: 10000 INJECTION INTRAVENOUS; SUBCUTANEOUS at 05:52

## 2020-01-01 RX ADMIN — HYDRALAZINE HYDROCHLORIDE 10 MG: 20 INJECTION, SOLUTION INTRAMUSCULAR; INTRAVENOUS at 21:35

## 2020-01-01 RX ADMIN — SODIUM CHLORIDE, PRESERVATIVE FREE 300 UNITS: 5 INJECTION INTRAVENOUS at 22:14

## 2020-01-01 RX ADMIN — HEPARIN SODIUM 5000 UNITS: 10000 INJECTION INTRAVENOUS; SUBCUTANEOUS at 22:31

## 2020-01-01 RX ADMIN — HEPARIN SODIUM 5000 UNITS: 10000 INJECTION INTRAVENOUS; SUBCUTANEOUS at 06:52

## 2020-01-01 RX ADMIN — HEPARIN SODIUM 5000 UNITS: 10000 INJECTION INTRAVENOUS; SUBCUTANEOUS at 06:48

## 2020-01-01 RX ADMIN — HEPARIN SODIUM 5000 UNITS: 10000 INJECTION INTRAVENOUS; SUBCUTANEOUS at 22:14

## 2020-01-01 RX ADMIN — SODIUM CHLORIDE: 9 INJECTION, SOLUTION INTRAVENOUS at 17:14

## 2020-01-01 RX ADMIN — ACETAMINOPHEN 650 MG: 325 TABLET ORAL at 16:54

## 2020-01-01 RX ADMIN — ACETAMINOPHEN 650 MG: 325 TABLET ORAL at 09:56

## 2020-01-01 RX ADMIN — HEPARIN SODIUM 5000 UNITS: 10000 INJECTION INTRAVENOUS; SUBCUTANEOUS at 05:25

## 2020-01-01 RX ADMIN — ATORVASTATIN CALCIUM 40 MG: 40 TABLET, FILM COATED ORAL at 22:02

## 2020-01-01 RX ADMIN — POTASSIUM CHLORIDE 40 MEQ: 400 INJECTION, SOLUTION INTRAVENOUS at 08:25

## 2020-01-01 RX ADMIN — TOCILIZUMAB 400 MG: 20 INJECTION, SOLUTION, CONCENTRATE INTRAVENOUS at 23:41

## 2020-01-01 RX ADMIN — Medication 1 MG: at 11:42

## 2020-01-01 RX ADMIN — AMLODIPINE BESYLATE 10 MG: 10 TABLET ORAL at 16:01

## 2020-01-01 RX ADMIN — DEXTROSE MONOHYDRATE: 50 INJECTION, SOLUTION INTRAVENOUS at 05:13

## 2020-01-01 RX ADMIN — CEFEPIME HYDROCHLORIDE 1 G: 1 INJECTION, POWDER, FOR SOLUTION INTRAMUSCULAR; INTRAVENOUS at 12:21

## 2020-01-01 RX ADMIN — Medication 10 ML: at 19:40

## 2020-01-01 RX ADMIN — SODIUM CHLORIDE 50 ML: 3 INJECTION, SOLUTION INTRAVENOUS at 09:13

## 2020-01-01 RX ADMIN — DEXTROSE MONOHYDRATE: 50 INJECTION, SOLUTION INTRAVENOUS at 00:41

## 2020-01-01 RX ADMIN — LABETALOL HYDROCHLORIDE 2.5 MG: 5 INJECTION INTRAVENOUS at 13:55

## 2020-01-01 RX ADMIN — SODIUM CHLORIDE 125 ML/HR: 3 INJECTION, SOLUTION INTRAVENOUS at 18:03

## 2020-01-01 RX ADMIN — HEPARIN 100 UNITS: 100 SYRINGE at 20:21

## 2020-01-01 RX ADMIN — FENTANYL CITRATE 50 MCG: 50 INJECTION, SOLUTION INTRAMUSCULAR; INTRAVENOUS at 13:19

## 2020-01-01 RX ADMIN — PETROLATUM: 42 OINTMENT TOPICAL at 10:31

## 2020-01-01 RX ADMIN — CEFEPIME HYDROCHLORIDE 1 G: 1 INJECTION, POWDER, FOR SOLUTION INTRAMUSCULAR; INTRAVENOUS at 12:53

## 2020-01-01 RX ADMIN — Medication 10 ML: at 09:54

## 2020-01-01 RX ADMIN — Medication 10 ML: at 07:37

## 2020-01-01 RX ADMIN — ACYCLOVIR 400 MG: 200 SUSPENSION ORAL at 14:09

## 2020-01-01 RX ADMIN — SENNOSIDES 8.6 MG: 8.6 TABLET, FILM COATED ORAL at 20:07

## 2020-01-01 RX ADMIN — LEVOTHYROXINE SODIUM 50 MCG: 0.05 TABLET ORAL at 05:44

## 2020-01-01 RX ADMIN — HEPARIN SODIUM 5000 UNITS: 10000 INJECTION INTRAVENOUS; SUBCUTANEOUS at 06:02

## 2020-01-01 RX ADMIN — LEVOTHYROXINE SODIUM 50 MCG: 0.05 TABLET ORAL at 05:42

## 2020-01-01 RX ADMIN — COLCHICINE 0.6 MG: 0.6 TABLET, FILM COATED ORAL at 09:43

## 2020-01-01 RX ADMIN — AMLODIPINE BESYLATE 10 MG: 10 TABLET ORAL at 09:37

## 2020-01-01 RX ADMIN — CEFTRIAXONE SODIUM 1 G: 1 INJECTION, POWDER, FOR SOLUTION INTRAMUSCULAR; INTRAVENOUS at 20:14

## 2020-01-01 RX ADMIN — HEPARIN SODIUM 5000 UNITS: 10000 INJECTION INTRAVENOUS; SUBCUTANEOUS at 22:09

## 2020-01-01 RX ADMIN — Medication 10 ML: at 08:33

## 2020-01-01 RX ADMIN — FEBUXOSTAT 80 MG: 80 TABLET, FILM COATED ORAL at 10:07

## 2020-01-01 RX ADMIN — HEPARIN SODIUM 5000 UNITS: 10000 INJECTION INTRAVENOUS; SUBCUTANEOUS at 06:58

## 2020-01-01 RX ADMIN — LACTULOSE 20 G: 10 SOLUTION ORAL at 21:35

## 2020-01-01 RX ADMIN — LANSOPRAZOLE 30 MG: KIT at 06:46

## 2020-01-01 RX ADMIN — CEFTRIAXONE 1 G: 1 INJECTION, POWDER, FOR SOLUTION INTRAMUSCULAR; INTRAVENOUS at 05:13

## 2020-01-01 RX ADMIN — SODIUM CHLORIDE, PRESERVATIVE FREE 10 ML: 5 INJECTION INTRAVENOUS at 20:14

## 2020-01-01 RX ADMIN — LABETALOL HYDROCHLORIDE 10 MG: 5 INJECTION INTRAVENOUS at 17:45

## 2020-01-01 RX ADMIN — SODIUM CHLORIDE 100 ML/HR: 3 INJECTION, SOLUTION INTRAVENOUS at 20:24

## 2020-01-01 RX ADMIN — Medication 10 ML: at 22:01

## 2020-01-01 RX ADMIN — CEFEPIME HYDROCHLORIDE 1 G: 1 INJECTION, POWDER, FOR SOLUTION INTRAMUSCULAR; INTRAVENOUS at 13:12

## 2020-01-01 RX ADMIN — LANSOPRAZOLE 30 MG: KIT at 06:30

## 2020-01-01 RX ADMIN — PETROLATUM: 42 OINTMENT TOPICAL at 09:00

## 2020-01-01 RX ADMIN — HEPARIN SODIUM 5000 UNITS: 10000 INJECTION INTRAVENOUS; SUBCUTANEOUS at 15:22

## 2020-01-01 RX ADMIN — DEXTROSE MONOHYDRATE: 50 INJECTION, SOLUTION INTRAVENOUS at 13:10

## 2020-01-01 RX ADMIN — ENOXAPARIN SODIUM 40 MG: 40 INJECTION SUBCUTANEOUS at 08:03

## 2020-01-01 RX ADMIN — SODIUM CHLORIDE: 9 INJECTION, SOLUTION INTRAVENOUS at 02:30

## 2020-01-01 RX ADMIN — HEPARIN SODIUM 5000 UNITS: 10000 INJECTION INTRAVENOUS; SUBCUTANEOUS at 15:18

## 2020-01-01 RX ADMIN — FEBUXOSTAT 80 MG: 80 TABLET, FILM COATED ORAL at 17:42

## 2020-01-01 RX ADMIN — LEVOTHYROXINE SODIUM 50 MCG: 0.05 TABLET ORAL at 06:50

## 2020-01-01 RX ADMIN — Medication 10 ML: at 08:03

## 2020-01-01 RX ADMIN — PETROLATUM: 42 OINTMENT TOPICAL at 10:37

## 2020-01-01 RX ADMIN — SODIUM CHLORIDE: 9 INJECTION, SOLUTION INTRAVENOUS at 17:06

## 2020-01-01 RX ADMIN — NICARDIPINE HYDROCHLORIDE 7 MG/HR: 2.5 INJECTION, SOLUTION INTRAVENOUS at 03:00

## 2020-01-01 RX ADMIN — Medication 10 ML: at 10:16

## 2020-01-01 ASSESSMENT — PAIN SCALES - GENERAL
PAINLEVEL_OUTOF10: 5
PAINLEVEL_OUTOF10: 0
PAINLEVEL_OUTOF10: 4
PAINLEVEL_OUTOF10: 0
PAINLEVEL_OUTOF10: 6
PAINLEVEL_OUTOF10: 0
PAINLEVEL_OUTOF10: 0
PAINLEVEL_OUTOF10: 5
PAINLEVEL_OUTOF10: 0
PAINLEVEL_OUTOF10: 3
PAINLEVEL_OUTOF10: 0
PAINLEVEL_OUTOF10: 3
PAINLEVEL_OUTOF10: 0
PAINLEVEL_OUTOF10: 8
PAINLEVEL_OUTOF10: 0
PAINLEVEL_OUTOF10: 3
PAINLEVEL_OUTOF10: 0
PAINLEVEL_OUTOF10: 4
PAINLEVEL_OUTOF10: 10
PAINLEVEL_OUTOF10: 0
PAINLEVEL_OUTOF10: 7
PAINLEVEL_OUTOF10: 0
PAINLEVEL_OUTOF10: 5
PAINLEVEL_OUTOF10: 0

## 2020-01-01 ASSESSMENT — PULMONARY FUNCTION TESTS
PIF_VALUE: 1
PIF_VALUE: 1
PIF_VALUE: 0
PIF_VALUE: 1
PIF_VALUE: 0
PIF_VALUE: 2
PIF_VALUE: 0
PIF_VALUE: 3
PIF_VALUE: 0
PIF_VALUE: 1
PIF_VALUE: 0
PIF_VALUE: 1
PIF_VALUE: 0
PIF_VALUE: 1
PIF_VALUE: 0
PIF_VALUE: 1
PIF_VALUE: 0

## 2020-01-01 ASSESSMENT — PAIN SCALES - WONG BAKER
WONGBAKER_NUMERICALRESPONSE: 0

## 2020-01-01 ASSESSMENT — PAIN DESCRIPTION - LOCATION
LOCATION: BACK;HEAD
LOCATION: SHOULDER;HIP

## 2020-01-01 ASSESSMENT — PAIN DESCRIPTION - FREQUENCY: FREQUENCY: INTERMITTENT

## 2020-01-01 ASSESSMENT — ENCOUNTER SYMPTOMS
GASTROINTESTINAL NEGATIVE: 1
SHORTNESS OF BREATH: 0
ABDOMINAL PAIN: 0
RESPIRATORY NEGATIVE: 1
CHEST TIGHTNESS: 0

## 2020-01-01 ASSESSMENT — PAIN DESCRIPTION - ORIENTATION
ORIENTATION: LEFT
ORIENTATION: LOWER
ORIENTATION: LOWER

## 2020-01-01 ASSESSMENT — VISUAL ACUITY: METHOD_CF: 1

## 2020-01-01 ASSESSMENT — PAIN - FUNCTIONAL ASSESSMENT: PAIN_FUNCTIONAL_ASSESSMENT: ACTIVITIES ARE NOT PREVENTED

## 2020-01-01 ASSESSMENT — PAIN DESCRIPTION - PAIN TYPE
TYPE: ACUTE PAIN
TYPE: ACUTE PAIN

## 2020-10-23 PROBLEM — I63.9 STROKE, ACUTE, THROMBOTIC (HCC): Status: ACTIVE | Noted: 2020-01-01

## 2020-10-23 NOTE — PROGRESS NOTES
1303NEUROINTERVENTION PROCEDURE NOTE    PATIENT NAME: Padilla Irene  MRN: [id number]  : 1955  DATE OF PROCEDURE: 10/23/20    Stroke Metrics  NIHSS prior to procedure: 21  IV TPA Administered: [] Yes  [x]  No  Consent obtained: [x] Yes  []  No  by dr Mavis Paul     Neurointerventionalist: angie  1st assistant   Time Event Device Notes   1320 Groin puncture     1330 1st pass Suction Reperfusion ndgndrndanddndend:nd nd2nd 1335 2nd pass suction Reperfusion grade:2B    1341 3rd pass suction Reperfusion thgthrthathdtheth:th4th 4th pass  Reperfusion grade:          1348 Groin closure  Sheath pulled and  Angioseal used to close arterial puncture. Puncture site cleansed and dry dressing applied. No bleeding, swelling or complications noted, no change in pulses. IA tPA adminstered: [] Yes  [x]  No       Time Event Dose     IA tPA administered      IA tPA administered      IA tPA administered       Post-procedure NIHSS  unable to assess due to anesthesia/sedation     1403  Patient placed on bed and transported to CT    1410  CT head completed.     1413  Patient transported to PACU  and handoff report given to Angela Harrison RN    Family updated: [x] Yes  []  No       Wife taken to surgery waiting area PACU aware patient wife is there

## 2020-10-23 NOTE — VIRTUAL HEALTH
Consults  Patient Location:  [address] [place] [address] Emergency Department    Provider Location ([facility]/State):   [address] [place] [address]    This virtual visit was conducted via interactive/real-time audio/video. [facility],4[facility] Stroke and Vascular Neurology Consult for  [facility] Stroke Alert through [address] @ 12:19pm  10/23/2020 2:53 PM  Pt Name: Caren Burnham  MRN: [id number]  [place]: 1955  Date of evaluation: 10/23/2020  Primary Care Physician: Jo-Ann Morales MD  Reason for Evaluation: Stroke Evaluation with Discussion with Ed or primary team with Telemedicine and stroke evaluation with Review of imaging and labs    Caren Burnham is a 60 y.o. male who was well yesterday, went to bed around 10pm, woke up at 8 and found to have left hemiplegia. Allergies  is allergic to bactrim [sulfamethoxazole-trimethoprim]. Medications  Prior to Admission medications    Medication Sig Start Date End Date Taking? Authorizing Provider   colchicine (COLCRYS) 0.6 MG tablet Take 0.6 mg by mouth daily   Yes Historical Provider, MD   Febuxostat 80 MG TABS Take 80 mg by mouth daily   Yes Historical Provider, MD   levothyroxine (SYNTHROID) 50 MCG tablet Take 50 mcg by mouth Daily   Yes Historical Provider, MD   valACYclovir (VALTREX) 1 g tablet Take 1,000 mg by mouth daily   Yes Historical Provider, MD   ibuprofen (ADVIL;MOTRIN) 800 MG tablet Take 1 tablet by mouth every 6 hours as needed for Pain 6/3/20  Yes Jo-Ann Morales MD    Scheduled Meds:   lidocaine PF  5 mL Intradermal Once    heparin flush  3 mL Intravenous 2 times per day     Continuous Infusions:   sodium chloride      niCARdipine       PRN Meds:.sodium chloride flush, heparin flush  Past Medical History   has a past medical history of Allergic rhinitis, Cancer ([address] Gout, and Thyroid disease.   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   Social Needs    Financial resource strain: Not on file    Food insecurity     Worry: Not on file     Inability: Not on file   [employer] needs     Medical: Not on file     Non-medical: Not on file   Tobacco Use    Smoking status: Never Smoker    Smokeless tobacco: Never Used   Substance and Sexual Activity    Alcohol use: No     Alcohol/week: 0.0 standard drinks     Comment: social    Drug use: No    Sexual activity: Not on file   Lifestyle    Physical activity     Days per week: Not on file     Minutes per session: Not on file    Stress: Not on file   Relationships    Social connections     Talks on phone: Not on file     Gets together: Not on file     Attends Episcopal service: Not on file     Active member of club or organization: Not on file     Attends meetings of clubs or organizations: Not on file     Relationship status: Not on file    Intimate partner violence     Fear of current or ex partner: Not on file     Emotionally abused: Not on file     Physically abused: Not on file     Forced sexual activity: Not on file   Other Topics Concern    Not on file   Social History Narrative    Not on file     Family History      Problem Relation Age of Onset    Coronary Art Dis Father         aged 61    Breast Cancer Mother     Mult Sclerosis Sister        OBJECTIVE  BP (!) 156/71   Pulse 88   Temp 98 °F (36.7 °C)   Resp 14   SpO2 96%     NIH Stroke Scale  NIH Stroke Scale Assessed: Yes  Interval: Baseline  Level of Consciousness (1a. ): Not alert, but arousable by minor stimulation to obey  LOC Questions (1b. ):  Answers one correctly  LOC Commands (1c. ): Performs neither task correctly  Best Gaze (2. ): (!) Forced deviation  Visual (3. ): (!) Complete hemianopia  Facial Palsy (4. ): (!) Complete paralysis  Motor Arm, Left (5a. ): No movement  Motor Arm, Right (5b. ): No drift  Motor Leg, Left (6a. ): No movement  Motor Leg, Right (6b. ): No drift  Limb Ataxia (7. ): (!) Present in one limb  Sensory (8. ): (!) Severe to total loss  Best Language (9. ): No aphasia  Dysarthria (10. ): Mild to moderate dysarthria  Extinction and Inattention (11): (!) Profound adolfo-inattention or extinction to more than one modality  Total: 21  Pre-Morbid mRS 0    Imaging:  Images were personally reviewed in pacs with joanne if available and used to review images including:  CT brain without contrast: dense R MCA sign with R MCA small strokes  CTA imaging: R M1 occlusion, R ICA occlusion    Assessment    61 yo man with acute R ICA/MCA thrombus and R MCA stroke. Recommendations:  1. NIH 21  2. Recommend Inpatient Neurology Consult for further assessment and evaluation   3.  consider . BSMHNOIVTPA  4. This is not a tPA candidate as he is out of tPA window, CT head already showed acute infarct, making him ineligible for extended tPA  5. However, CTP showed a sizable penumbra, spoke to neuro-endovascular attending Dr. Shine Alatorre, who also agrees this is a thrombectomy candidate as benefits outweigh the risk. 6. Recommend keeping BP below 140/90 after successful thrombectomy        Discussed with ED Physician Dr. Howie Fulton, Dr. Shine Alatorre      This is a Phone Consult, I have not seen the patient face to face, the telemedicine device was not utilized.  as patient was be seen by neuroendovascular attending Dr. Aisha Faulkner MD   Stroke, Neurocritical Care And/or [address] [place] [address] Stroke [id number] [address] [place]  Electronically signed 10/23/2020 at 2:53 PM

## 2020-10-23 NOTE — PROGRESS NOTES
Neuro Science Intensive Care Unit  Critical Care Consult 10/23/2020      Date of Admission: 10/23    CC: Left sided weakness    HPI: 60year old male with a PMH of CLL presents to the ED with c/o left sided weakness. Work up demonstrated R ICA occlusion and right MCA occlusion. Patient went for mechanical thrombectomy. Repeat CT scan did demonstrated expected reperfusion hemorrhage, patient admitted to ICU for further care. Problem List:   Patient Active Problem List   Diagnosis    Chronic lymphocytic leukemia ([facility]    Acquired hypothyroidism    Chronic idiopathic gout of left foot    Recurrent genital herpes    Prediabetes    Stroke, acute, thrombotic ([facility]       Surgical/Interventional Procedures:   10/23: IR Thrombectomy     PMH:    has a past medical history of Allergic rhinitis, Cancer ([facility] Gout, and Thyroid disease. PSH:    has a past surgical history that includes back surgery and tumor excision. Home Medications:   Prior to Admission medications    Medication Sig Start Date End Date Taking? Authorizing Provider   colchicine (COLCRYS) 0.6 MG tablet Take 0.6 mg by mouth daily   Yes Historical Provider, MD   Febuxostat 80 MG TABS Take 80 mg by mouth daily   Yes Historical Provider, MD   levothyroxine (SYNTHROID) 50 MCG tablet Take 50 mcg by mouth Daily   Yes Historical Provider, MD   valACYclovir (VALTREX) 1 g tablet Take 1,000 mg by mouth daily   Yes Historical Provider, MD   ibuprofen (ADVIL;MOTRIN) 800 MG tablet Take 1 tablet by mouth every 6 hours as needed for Pain 6/3/20  Yes García Luther MD       Allergies:      Allergies   Allergen Reactions    Bactrim [Sulfamethoxazole-Trimethoprim] Rash       Social History:  Social History     Socioeconomic History    Marital status:      Spouse name: Not on file    Number of children: 1    Years of education: Not on file    Highest education level: Not on file   Occupational History    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   Social Needs    Financial resource strain: Not on file    Food insecurity     Worry: Not on file     Inability: Not on file   [employer] needs     Medical: Not on file     Non-medical: Not on file   Tobacco Use    Smoking status: Never Smoker    Smokeless tobacco: Never Used   Substance and Sexual Activity    Alcohol use: No     Alcohol/week: 0.0 standard drinks     Comment: social    Drug use: No    Sexual activity: Not on file   Lifestyle    Physical activity     Days per week: Not on file     Minutes per session: Not on file    Stress: Not on file   Relationships    Social connections     Talks on phone: Not on file     Gets together: Not on file     Attends Jew service: Not on file     Active member of club or organization: Not on file     Attends meetings of clubs or organizations: Not on file     Relationship status: Not on file    Intimate partner violence     Fear of current or ex partner: Not on file     Emotionally abused: Not on file     Physically abused: Not on file     Forced sexual activity: Not on file   Other Topics Concern    Not on file   Social History Narrative    Not on file       Family History:   Family History   Problem Relation Age of Onset    Coronary Art Dis Father         aged 61    Breast Cancer Mother     Mult Sclerosis Sister        VITAL SIGNS:   BP (!) 156/71   Pulse 89   Temp 97.6 °F (36.4 °C)   Resp 16   SpO2 97%     PHYSICAL EXAM:    General appearance:  Comfortable. GCS:    1 - Does not open eyes   5 - Pushes away noxious stimulus  1 - Makes no noise    Pupil size:  Left 3 mm    Right 3 mm  Pupil reaction: Yes  Wiggles fingers: Left No Right No  Hand grasp:   Left absent    Right absent  Wiggles toes: Left No    Right No  Plantar flexion: Left absent   Right absent    CONSTITUTIONAL: no acute distress, lying in hospital bed.     NEUROLOGIC: PERRL, flaccid left upper and lower extremities, responds to pain with right upper and lower  CARDIOVASCULAR: S1 S2, regular rate, regular rhythm, no murmur/gallop/rub. Monitor:sinus  PULMONARY: no rhonchi/rales/wheezes, no use of accessory muscles, RA  RENAL: whitehead to gravity, clear yellow urine  ABDOMEN: soft, nontender, nondistended, nontympanic, no masses, no organomegaly, normal bowel sounds   SKIN/EXTREMITIES: no rashes/ecchymosis, no edema/clubbing, warm/dry, good capillary refill     Assessment & Plan:       · Neuro:  Right ischemic stroke with occlusion s/p thrombectomy . Monitor neuro status, Neurosurgery following, 3 %  · CV: No acute issues. Monitor hemodynamics. BP goal 120-150 PRN hydralazine & labetalol. Cardene if needed  · Pulm: High risk for acute respiratory failure. .  Monitor RR & SpO2. Pulmonary hygiene. -155, serum Osmo 310-320  · GI: Dysphagia. Gastric tube. NPO. Speech. Monitor bowel function. · Renal:  No acute issues. Monitor BUN & Cr. Monitor electrolytes & replace as needed. Monitor urine output. · ID: No acute issues  · Endocrine: No acute issues. Monitor BS. · MSK: No acute issues. ROM. turn & reposition. · Heme: No acute issues. Monitor CBC. Bowel regime: Glycolax   Pain control/Sedation:  Tylenol  DVT prophylaxis: SCDs. No Lovenox/heparin until ok with neurosurgery. Mouth/Eye care: as needed  Whitehead: Keep in place for critical care monitoring of fluid balance. Family update:  Wife updated at bedside   Code status:  Full    Disposition:  ICU      Electronically signed by SHANIA Akbar CNP on 10/23/2020 at 3:49 PM

## 2020-10-23 NOTE — ED NOTES
Bed: 07  Expected date:   Expected time:   Means of arrival:   Comments:  RAMIRO Lara RN  10/23/20 3175

## 2020-10-23 NOTE — POST SEDATION
Post Procedure Note    Patient Name: Jodie Bruno   YOB: 1955  Room/Bed: JAMI/JAMI  Medical Record Number: [id number]  Date: 10/23/2020   Time: 2:24 PM         Physicians/Assistants: Carla Torre MD    Procedure Performed:  Successful Right ICA and Right MCA thrombectomy    Post-Sedation Vital Signs:  Vitals:    10/23/20 1418   BP: (!) 156/71   Pulse: 88   Resp: 14   Temp: 98 °F (36.7 °C)   SpO2: 96%       Successful Carotid thrombectomy and Successful Right MCA thrombectomy TICI 3 recanalization     Post op CT head shows a bleed reperfusion injury that was mentioned to be highly likely due to the large core , Discussed with patient wife and she is aware of the same going into the procedure    Will discuss management with ICU team     Hold Antiplt  PICC line  Start 3%

## 2020-10-23 NOTE — ANESTHESIA POSTPROCEDURE EVALUATION
Department of Anesthesiology  Postprocedure Note    Patient: Libby Farr  MRN: [id number]  YOB: 1955  Date of evaluation: 10/23/2020  Time:  5:19 PM     Procedure Summary     Date:  10/23/20 Room / Location:  [address] [place] [address] Procedures; [facility] Radiology    Anesthesia Start:  3337 Anesthesia Stop:  4715    Procedure:  IR MECHANICAL ART THROMBECTOMY INTRACRANIAL Diagnosis:  (thrombetcomy)    Scheduled Providers:  [facility] General Radiologist Responsible Provider:  Anton Mejia DO    Anesthesia Type:  MAC ASA Status:  3 - Emergent          Anesthesia Type: No value filed. Pierre Phase I: Pierre Score: 8    Pierre Phase II:      Last vitals: Reviewed and per EMR flowsheets.        Anesthesia Post Evaluation    Patient location during evaluation: bedside  Patient participation: complete - patient cannot participate  Level of consciousness: awake and alert  Airway patency: patent  Nausea & Vomiting: no nausea and no vomiting  Complications: no  Cardiovascular status: blood pressure returned to baseline  Respiratory status: acceptable  Hydration status: euvolemic

## 2020-10-23 NOTE — PRE-PROCEDURE INSTRUCTIONS
Neurointervention Pre procedure Note      Medical record number:  [id number]      Procedure: Right ICA thrombectomy Right MCA thrombectomy   Indications:  Acute Stroke Wake up  Labs: The patient's record including history and physical, available labs and procedures, medications and allergies has been reviewed. PRE-PROCEDURE ATTESTATION STATEMENT  I have explained the potential risks, benefits, and side effects of the proposed procedure/treatment, including the risk of death (if appropriate) to the patient and/or surrogate. Also, the possibility for the transfusion of blood or blood components (only if potential for transfusion is applicable) was discussed with risks, benefits, and alternatives. This explanation included discussion of the likelihood of the patient achieving his or her goals and any potential problems that might occur during recuperation. Reasonable alternatives to the proposed procedure/treatment including risks, benefits, and side effects were also discussed, as were the risks related to not receiving the proposed procedure/treatment. The patient and/or surrogate have elected to proceed with the proposed procedure/treatment. .      Patient and wife are aware of large infarct core but  [address] is 2.3  He is 65 and highly functional     Sedation and/or anesthesia risk, benefits, and alternatives discussed and questions answered. Vital Signs:  Vitals:    10/23/20 1211 10/23/20 1234   BP: (!) 165/81 (!) 165/81   Pulse: 79 84   Resp: 16 16   Temp: 97.7 °F (36.5 °C)    TempSrc: Temporal    SpO2: 95% 97%         GENERAL: NPO: YES  ASA: ASA 1 - Normal health patient  MALLAMPATI: II (soft palate, uvula, fauces visible)    Anesthesia Plan:  Plan for conscious sedation. / Please see anesthesia plan      Impression & Plan:   1.   Proceed with thrombectomy, Two neurologist and ER physician agree for plan for thrombectomy as best chance of survival

## 2020-10-23 NOTE — ED PROVIDER NOTES
Department of Emergency Medicine   ED  Provider Note  Admit Date/RoomTime: 10/23/2020 12:05 PM  ED Room: JAMI/JAMI        10/23/20  12:13 PM EDT    Stroke Alert called: yes, converted to Rosa Pro alert when Almarie Homes was confirmed    HISTORY OF PRESENT ILLNESS:  (Nurses Notes Reviewed)    Chief Complaint:   Cerebrovascular Accident (Wife last saw pt at 10pm, Pt's mom found pt this am with L side weakness)      Source of history provided by:  spouse/SO. History/Exam Limitations: acuity of illness. Wong Rob is a 60 y.o. old male presenting to the emergency department by EMS, with sudden onset of left sided weakness and left sided neglect, which began sometime after going to sleep last night. Last known well time: 10pm last night. The episode occurred at home. Since recognized the symptoms have been persistent. He has no neurologic history. He has stroke risk factors of: hyperlipidemia and hypertension. There have been associated symptoms of nothing. There has been no history of recent trauma. Patient was normal yesterday. Wife reports that he worked outside and was walking around had no issues. She reports he was complaining for the last few days of left arm tingling and numbness and some periods where it felt weak. She reports it did not keep him from doing his workout. She reports he went to bed okay. She reports that she went to work just before 8 and second morning to him and he answered but he never got out of bed and never acknowledged her anymore. She did not see him moving any of his extremities at that time. He simply answered her question. Code Status on file: No Order. NIH Stroke Scale at time of initial evaluation:   NIH/MNHISS Stroke Scale  Interval: Baseline  Level of Consciousness (1a. ): Alert  LOC Questions (1b. ):  Answers both correctly  LOC Commands (1c. ): Performs both tasks correctly  Best Gaze (2. ): (!) Forced deviation  Visual (3. ): (!) Complete hemianopia  Facial Palsy (4. ): (!) Complete paralysis  Motor Arm, Left (5a. ): No movement  Motor Arm, Right (5b. ): No drift  Motor Leg, Left (6a. ): No movement  Motor Leg, Right (6b. ): No drift  Limb Ataxia (7. ): (!) Present in one limb  Sensory (8. ): (!) Severe to total loss  Best Language (9. ): No aphasia  Dysarthria (10. ): Mild to moderate dysarthria  Extinction and Inattention (11): (!) Profound adolfo-inattention or extinction to more than one modality  Total: 21      Past Medical History:  has a past medical history of Allergic rhinitis, Cancer ([facility] [address] Gout, and Thyroid disease. Past Surgical History:  has a past surgical history that includes back surgery and tumor excision. Social History:  reports that he has never smoked. He has never used smokeless tobacco. He reports that he does not drink alcohol or use drugs. Prior Functional Status(Modified Sera Scale):  0=No symptoms at all    Family History: family history includes Breast Cancer in his mother; Coronary Art Dis in his father; Mult Sclerosis in his sister. The patients home medications have been reviewed. Prior to Admission medications    Medication Sig Start Date End Date Taking?  Authorizing Provider   colchicine (COLCRYS) 0.6 MG tablet Take 0.6 mg by mouth daily   Yes Historical Provider, MD   Febuxostat 80 MG TABS Take 80 mg by mouth daily   Yes Historical Provider, MD   levothyroxine (SYNTHROID) 50 MCG tablet Take 50 mcg by mouth Daily   Yes Historical Provider, MD   valACYclovir (VALTREX) 1 g tablet Take 1,000 mg by mouth daily   Yes Historical Provider, MD   ibuprofen (ADVIL;MOTRIN) 800 MG tablet Take 1 tablet by mouth every 6 hours as needed for Pain 6/3/20  Yes Oralia Perez MD       Allergies: Bactrim [sulfamethoxazole-trimethoprim]       Review of Systems:   Pertinent positives and negatives are stated within HPI, all other systems reviewed and are negative.    ---------------------------------------------------PHYSICAL EXAM--------------------------------------    Constitutional/General: Alert and oriented x3, well appearing, non toxic in NAD  Head: Normocephalic and atraumatic  Eyes: PERRL, forced gaze palsy to the right  Mouth: Oropharynx clear, handling secretions, no trismus. Complete left-sided facial droop  Neck: Supple, full ROM, non tender to palpation in the midline, no stridor, no crepitus, no meningeal signs  Pulmonary: Lungs clear to auscultation bilaterally, no wheezes, rales, or rhonchi. Not in respiratory distress  Cardiovascular:  Regular rate. Regular rhythm. No murmurs, gallops, or rubs. 2+ distal pulses  Chest: no chest wall tenderness  Abdomen: Soft. Non tender. Non distended. +BS. No rebound, guarding, or rigidity. No pulsatile masses appreciated. Musculoskeletal:  Warm and well perfused, no clubbing, cyanosis, or edema. Capillary refill <3 seconds  Skin: warm and dry. No rashes. Neurologic: GCS 15, f laccid left-sided paralysis. Unable to move the left arm or leg. Left facial droop. Complete left-sided neglect. Forced gaze deviation to the right. Dysarthria as well as absent sensation on the left as well. Able to move the right side. Please also see NIH stroke scale.   Psych: Normal Affect      -------------------------------------------------- RESULTS -------------------------------------------------  All laboratory and imaging studies have been reviewed by myself    LABS:  Results for orders placed or performed during the hospital encounter of 10/23/20   CBC Auto Differential   Result Value Ref Range    WBC 10.1 4.5 - 11.5 E9/L    RBC 4.57 3.80 - 5.80 E12/L    Hemoglobin 14.3 12.5 - 16.5 g/dL    Hematocrit 43.1 37.0 - 54.0 %    MCV 94.3 80.0 - 99.9 fL    MCH 31.3 26.0 - 35.0 pg    MCHC 33.2 32.0 - 34.5 %    RDW 12.4 11.5 - 15.0 fL    Platelets 739 540 - 707 E9/L    MPV 10.0 7.0 - 12.0 fL    Neutrophils % 73.5 43.0 - 80.0 %    Immature Granulocytes % 0.3 0.0 - 5.0 %    Lymphocytes % 15.8 (L) 20.0 - 42.0 %    Monocytes % 6.2 2.0 - 12.0 %    Eosinophils % 3.3 0.0 - 6.0 %    Basophils % 0.9 0.0 - 2.0 %    Neutrophils Absolute 7.41 (H) 1.80 - 7.30 E9/L    Immature Granulocytes # 0.03 E9/L    Lymphocytes Absolute 1.59 1.50 - 4.00 E9/L    Monocytes Absolute 0.62 0.10 - 0.95 E9/L    Eosinophils Absolute 0.33 0.05 - 0.50 E9/L    Basophils Absolute 0.09 0.00 - 0.20 E9/L   Comprehensive Metabolic Panel   Result Value Ref Range    Sodium 140 132 - 146 mmol/L    Potassium 4.7 3.5 - 5.0 mmol/L    Chloride 103 98 - 107 mmol/L    CO2 25 22 - 29 mmol/L    Anion Gap 12 7 - 16 mmol/L    Glucose 141 (H) 74 - 99 mg/dL    BUN 16 8 - 23 mg/dL    CREATININE 0.8 0.7 - 1.2 mg/dL    GFR Non-African American >60 >=60 mL/min/1.73    GFR African American >60     Calcium 9.6 8.6 - 10.2 mg/dL    Total Protein 6.9 6.4 - 8.3 g/dL    Alb 4.3 3.5 - 5.2 g/dL    Total Bilirubin 0.5 0.0 - 1.2 mg/dL    Alkaline Phosphatase 79 40 - 129 U/L    ALT 22 0 - 40 U/L    AST 26 0 - 39 U/L   Protime-INR   Result Value Ref Range    Protime 11.4 9.3 - 12.4 sec    INR 1.0    APTT   Result Value Ref Range    aPTT 26.3 24.5 - 35.1 sec   POCT Glucose   Result Value Ref Range    Glucose 121 mg/dL    QC OK?  yes    POCT Glucose   Result Value Ref Range    Meter Glucose 121 (H) 74 - 99 mg/dL   POCT Venous   Result Value Ref Range    POC Sodium 142 132 - 146 mmol/L    POC Potassium 4.6 3.5 - 5.0 mmol/L    POC Chloride 107 100 - 108 mmol/L    POC Glucose 143 (H) 74 - 99 mg/dl    POC Creatinine 0.6 (L) 0.7 - 1.2 mg/dL    GFR Non-African American >60 >=60 mL/min/1.73    GFR  >60     Performed on SEE BELOW    EKG 12 Lead   Result Value Ref Range    Ventricular Rate 86 BPM    Atrial Rate 86 BPM    P-R Interval 200 ms    QRS Duration 94 ms    Q-T Interval 396 ms    QTc Calculation (Bazett) 473 ms    P Axis 65 degrees    R Axis 58 degrees    T Axis 63 degrees       RADIOLOGY:  Interpreted by Radiologist.  XR CHEST PORTABLE   Final Result   Diffuse prominent reticular markings may be related to interstitial edema. No airspace consolidation. CTA NECK W CONTRAST   Final Result   1. Occluded right internal carotid artery from the posterior margin   through the right M1 and likely into segment . Occluded right   vertebral artery from the origin to the basilar artery. 2. Estimated stenosis of the proximal left internal carotid artery by   NASCET criteria is not hemodynamically significant               CT BRAIN PERFUSION   Final Result      There is a large core infarct exceeding 50% in the region of the   distribution of the middle cerebral artery with a relatively small   region of peripheral ischemia            CTA HEAD W CONTRAST   Final Result   1. Occluded right internal carotid artery from the posterior margin   through the right M1 and likely into segment . Occluded right   vertebral artery from the origin to the basilar artery. 2. Estimated stenosis of the proximal left internal carotid artery by   NASCET criteria is not hemodynamically significant               CT HEAD WO CONTRAST   Final Result   Findings suspicious significant right MCA infarct. Hyperdensity in the right   MCA suggest thrombus. Significant amount of mucosal thickening in the sinuses particularly ethmoid   air cells. Critical finding: Results were called to Dr. Adriana Jones  on 10/23/2020 at 1250   hours         IR MECHANICAL ART THROMBECTOMY INTRACRANIAL    (Results Pending)       EKG Interpretation  Interpreted by emergency department physician. Rhythm: normal sinus   Rate: normal  Axis: normal  Conduction: normal  ST Segments: no acute change  T Waves: no acute change    Clinical Impression: Sinus rhythm, no acute ischemic changes    Comparison to Old EKG no old EKG          ------------------------- NURSING NOTES AND VITALS REVIEWED ---------------------------   The nursing notes within the ED encounter and vital signs as below have been reviewed.    BP (!) 165/81   Pulse 84   Temp 97.7 °F (36.5 °C) (Temporal)   Resp 16   SpO2 97%   Oxygen Saturation Interpretation: Normal    The patients available past medical records and past encounters were reviewed. ------------------------------ ED COURSE/MEDICAL DECISION MAKING----------------------  Medications   sodium chloride flush 0.9 % injection 10 mL (has no administration in time range)   iopamidol (ISOVUE-370) 76 % injection 100 mL (100 mLs Intravenous Given 10/23/20 1230)       Based upon patient's stroke like symptoms a stroke neurology consult is indicated. Consult to Neurology completed. [facility]. Neurology as well as [place] [facility]      Acute CVA Core Measures:   Last Known to be Well (Stroke Diagnosis)  Date Last Known Well: 10/22/20  Time Last Known Well: 200  NIH Stroke Scale Total: Total: 21  t-PA Eligibility: was not recommeded by [facility] Neurologist and under the order of the ED physician  IV t-PA was considered and not given due to violations in inclusion criteria including stroke onset was greater than 3 hours prior to presentation  No  indication for TPA. Medical Decision Making:    Stroke like symptoms, not a TPA candidate. CT scans with carotid occlusions as well as M1 occlusions. CT already shows stroke. Neurology was discussing with the family regarding possible salvage or palliative type endovascular therapy that is off label based on his age and severity of stroke. Patient needs to be admitted to the hospital regardless. Re-Evaluations:             Re-evaluation.   Patients symptoms show no change    This patient's ED course included: a personal history and physicial examination, re-evaluation prior to disposition, multiple bedside re-evaluations, IV medications, cardiac monitoring, continuous pulse oximetry, complex medical decision making and emergency management and a personal history and physicial eaxmination    This patient has remained hemodynamically stable during their ED course. Consultations: The case has been discussed with Dr. Harshad Thompson and Dr. Johnson Edwards they will admit the patient    Critical Care: CRITICAL CARE:  Please note that the withdrawal or failure to initiate urgent interventions for this patient would likely result in a life threatening deterioration or permanent disability. Accordingly this patient received 30 minutes of critical care time, including coordination of care, and direct bedside care and excluding separately billable procedures. Counseling: The emergency provider has spoken with the patient and spouse/SO and discussed todays presentation, in addition to providing specific details for the plan of care and counseling regarding the diagnosis and prognosis. Questions are answered at this time and they are agreeable with the plan.      --------------------------------- IMPRESSION AND DISPOSITION ---------------------------------    IMPRESSION  1.  Acute ischemic stroke [facility])        DISPOSITION  Disposition: Admit to CCU/ICU  Patient condition is critical             Forrest Vazquez MD  10/23/20 3298

## 2020-10-23 NOTE — PROGRESS NOTES
Vascular Access Procedure Note    Procedure Date:   10/23/2020    Pre-procedure Verification/Time-Out:  The proposed risks versus benefits of this procedure were discussed in detail by the physician with wife. written consent was obtained from the  patient's  wife. Relevant documentation was reviewed prior to procedure including signed consent form and medications. All necessary equipment for procedure is available at time of procedure yes. An audible time out was done at 1525PM by team members, correctly identifying patients name, medical record number, correct side, correct site, and correct procedure to be performed with registered nurse members of the procedure team all in agreement.         Indication for Procedure:   Reason for Insertion: other 3% saline/meds    ASA Assessment (Required for Moderate & Deep Sedation):      Procedure:   Reason for Consultation: power PICC line    Clinician Performing Procedure:   Alpheus Aschoff rn    Assistant:  nonoe    Sedation:   Analgesia Used: lidocaine 1%    Procedure Details/Findings:  Catheter Greek Size: 5 fr dl  Lot Number: [id number]  Product #: [id number]  Expiration Date: 10/31/2021  Maximum Barrier Precautions: cap, eye shield, full body drape, gloves, gown, handwashing and mask  Skin Prep: chlorhexidine  Technique: modified seldinger and ultrasound guided with VPS  Attempts: 1  Exposed (cm): 2  Total (cm): 45  Placement Site: right brachial vein  Vessel Size: 0.70 cm  Dressing: securement device, transparent dressing and biopatch  Blood Return: Yes   Ultrasound Guidance: Yes   Arm Circumference Mid-Bicep (cm): 30 cm  Chest X-Ray Ordered: VasoNova VPS  End Placement: caj    Complications:   none     Post-operative Condition:  stable  Patient Tolerated Procedure: well     Comments/Post-operative Education:   Post Procedure Interventions: no blood pressure sign placed above bed    Jenni Mckeon  10/23/20  4:01 PM

## 2020-10-23 NOTE — PROGRESS NOTES
Hilaria Wai was ordered febuxostat (Uloric) which is a nonformulary medication. The patient has indicated that the home supply of this medication will be brought in to the hospital for inpatient use. If the medication has not been administered by 1400 on the following day from the time the order was placed, a pharmacist will follow-up with the nurse of the patient to assess the capability of the patient to bring in the medication. If it is determined that the patient cannot supply the medication and it is not available to be dispensed from the pharmacy, a call will be placed to the ordering provider to discuss alternative options.      Ava Phelps, PharmD  10/23/20 6:26 PM

## 2020-10-23 NOTE — CONSULTS
Interventional Neurology - Neuro Critical Care Neurology History & Physical/Consult Note      Reason For Consultation: Consult from telestroke network and ER physician for large vessel occlusion right MCA occlusion right ICA occlusion. Consulted by [address] [place] [address] Physician : Patience Cummings MD          Assessment     Acute ischemic stroke with right ICA occlusion and right MCA occlusion with mild onset of symptoms a week ago however presenting with wake-up symptoms NIH of 21    Status post successful carotid thrombectomy and successful right MCA thrombectomy    Postprocedural reperfusion hemorrhage HI- 2 , this was to be expected and was explained to the wife and patient prior to the procedure due to the large infarct core measuring      Plan/Medical Decision Making    Patient has been having symptoms for the past week however significant change in  NIH occurred on wake up this morning. Patient is significantly disabled and normally is a healthy individual.  Reports did heavy lifting before he went to bed last night. Reviewing his scans I think he has been having TIAs and small strokes as a result of carotid stenosis. His carotid is currently occluded in his right MCA shows a tandem lesion. His infarct core is 108 cc which is extremely significant however his mismatch volume is 139 cc and mismatch ratio is 2.3 considering his age thrombectomy was offered as an off label extreme measure considering high risk up to 50% risk of bleed due to reperfusion injury and large infarct core. After extensive discussion family decided that they wish to have his function back and wished to proceed with thrombectomy rather than medical management.     Neurology  Serial Neurologic examinations every 1 hrs for 24 hours  Start 3% PICC line Na 150-155  Osm 310-320    -150  Recommend neurosurgery consult for evaluation      Cardiovascular  Maintain -150  hydralazine boluses prn  Maintain HR <120    Respiratory  Breathing by himself      Renal  Monitor I/O  Daily BMP, Mg, Phos  Replace electrolytes prn    Endocrine  Maintain blood sugar 100-180  Corrective,bolus, and basal insulin     Hematology  CBC daily   Transfuse if hgb < 7.0  Correct coagulopathy, Maintain INR < 1.4    Infectious Disease  Afebrile  Broke sterile field, given Cefazolin during procedure, watch for fever  Monitor temps, WBC count  Blood cultures as temp > 101    GI  NPO    Colace + Miralax PRN if needed     DVT prophylaxis- SCD + Hold on Lovenox for 48 hours from now  Stress Ulcer prophylaxis- pepcid    Condition-Serious          Chief Complaint-right MCA stroke    HPI-  The patient is a 60 y.o. male with wake-up onset left-sided weakness hemineglect, right eye deviation. He has been having left-sided numbness and left distal extremity weakness that has been resolving over the past 7 to 10 days that has been ignored. Objective      I/O last 3 completed shifts: In: 1000 [I.V.:1000]  Out: -   No intake/output data recorded. Vitals:    10/23/20 1418   BP: (!) 156/71   Pulse: 88   Resp: 14   Temp: 98 °F (36.7 °C)   SpO2: 96%         Physical Exam  CONST: alert sarcastic , may be confused  ENMT: external ears normal to inspection and palpation, hearing   EYE: conjunctiva/corneas clear, PERRLA, EOMI  NECK: negative  CV: RRR  PUL: negative  ABD: Abdomen soft and nontender without distention, masses , no wound infection noted. Musculoskeletal: no cyanosis, clubbing or edema present    Skin: No rashes or nodules noted. Neurologic exam         NIH Stroke Scale  NIH Stroke Scale Assessed: Yes  Interval: Baseline  Level of Consciousness (1a. ): Not alert, but arousable by minor stimulation to obey  LOC Questions (1b. ):  Answers one correctly  LOC Commands (1c. ): Performs neither task correctly  Best Gaze (2. ): (!) Forced deviation  Visual (3. ): (!) Complete hemianopia  Facial Palsy (4. ): (!) Complete paralysis  Motor Arm, Left (5a. ): No movement  Motor Arm, Right (5b. ): No drift  Motor Leg, Left (6a. ): No movement  Motor Leg, Right (6b. ): No drift  Limb Ataxia (7. ): (!) Present in one limb  Sensory (8. ): (!) Severe to total loss  Best Language (9. ): No aphasia  Dysarthria (10. ): Mild to moderate dysarthria  Extinction and Inattention (11): (!) Profound adolfo-inattention or extinction to more than one modality  Total: 21                Review of Systems-    Constitutional: Denies fever, chills, fatigue, and recent weight loss/gain  Eyes: Denies blurred vision, decreased vision, double vision and eye pain  ENT: Denies vertigo, hearing changes, and difficulty swallowing  Resp: Denies SOB, cough, sputum production, and hemoptysis  CV: Denies chest pain, claudication, irregular heart beat, lower extremity edema, palpitations and KRAMER  GI: Denies abdominal pain, n/v/d, constipation, and hematoemesis or hematochezia   : Denies dysuria, frequency, hematuria, incontinence and nocturnia  MS: Denies joint and muscle pain   Endocrine: Denies diabetes, thyroid disorder and adrenal dysfunction  Neurologic: signifcant neglect         Imaging-Reviewed and my independent evaluation differing from report will be as below if so will be indicated in my note    Ct Head Wo Contrast    Result Date: 10/23/2020  1. Hyperdensity in the right MCA distribution likely represents contrast staining from recent interventional procedure. 2. Mild edema in the right cerebral hemisphere resulting in minimal right to left shift of approximately 2 mm. Ct Head Wo Contrast    Result Date: 10/23/2020  Findings suspicious significant right MCA infarct. Hyperdensity in the right MCA suggest thrombus. Significant amount of mucosal thickening in the sinuses particularly ethmoid air cells.  Critical finding: Results were called to Dr. Norwood Police  on 10/23/2020 at 1250 hours     Xr Chest Portable    Result Date: 10/23/2020  Diffuse prominent reticular markings may be related to interstitial edema. No airspace consolidation. Cta Neck W Contrast    Result Date: 10/23/2020  1. Occluded right internal carotid artery from the posterior margin through the right M1 and likely into segment . Occluded right vertebral artery from the origin to the basilar artery. 2. Estimated stenosis of the proximal left internal carotid artery by NASCET criteria is not hemodynamically significant     Ct Brain Perfusion    Result Date: 10/23/2020  There is a large core infarct exceeding 50% in the region of the distribution of the middle cerebral artery with a relatively small region of peripheral ischemia     Cta Head W Contrast    Result Date: 10/23/2020  1. Occluded right internal carotid artery from the posterior margin through the right M1 and likely into segment . Occluded right vertebral artery from the origin to the basilar artery.  2. Estimated stenosis of the proximal left internal carotid artery by NASCET criteria is not hemodynamically significant           Labs-Reviewed , reviewed by me within normal

## 2020-10-23 NOTE — ANESTHESIA PRE PROCEDURE
Department of Anesthesiology  Preprocedure Note       Name:  Leeroy Almonte   Age:  60 y.o.  :  1955                                          MRN:  [id number]         Date:  10/23/2020      Surgeon: * No surgeons listed *    Procedure: * No procedures listed *    Medications prior to admission:   Prior to Admission medications    Medication Sig Start Date End Date Taking?  Authorizing Provider   colchicine (COLCRYS) 0.6 MG tablet Take 0.6 mg by mouth daily   Yes Historical Provider, MD   Febuxostat 80 MG TABS Take 80 mg by mouth daily   Yes Historical Provider, MD   levothyroxine (SYNTHROID) 50 MCG tablet Take 50 mcg by mouth Daily   Yes Historical Provider, MD   valACYclovir (VALTREX) 1 g tablet Take 1,000 mg by mouth daily   Yes Historical Provider, MD   ibuprofen (ADVIL;MOTRIN) 800 MG tablet Take 1 tablet by mouth every 6 hours as needed for Pain 6/3/20  Yes David Drake MD       Current medications:    Current Facility-Administered Medications   Medication Dose Route Frequency Provider Last Rate Last Dose    sodium chloride flush 0.9 % injection 10 mL  10 mL Intravenous Once PRN Saskia Bond II, MD         Current Outpatient Medications   Medication Sig Dispense Refill    colchicine (COLCRYS) 0.6 MG tablet Take 0.6 mg by mouth daily      Febuxostat 80 MG TABS Take 80 mg by mouth daily      levothyroxine (SYNTHROID) 50 MCG tablet Take 50 mcg by mouth Daily      valACYclovir (VALTREX) 1 g tablet Take 1,000 mg by mouth daily      ibuprofen (ADVIL;MOTRIN) 800 MG tablet Take 1 tablet by mouth every 6 hours as needed for Pain 120 tablet 3     Facility-Administered Medications Ordered in Other Encounters   Medication Dose Route Frequency Provider Last Rate Last Dose    0.9 % sodium chloride infusion    Continuous PRN Francella Magdaleno, APRN - CRNA        fentaNYL (SUBLIMAZE) injection    PRN Francella Magdaleno, APRN - CRNA   50 mcg at 10/23/20 1323    ceFAZolin (ANCEF) injection   Intravenous 10/23/2020    RBC 4.57 10/23/2020    HGB 14.3 10/23/2020    HCT 43.1 10/23/2020    MCV 94.3 10/23/2020    RDW 12.4 10/23/2020     10/23/2020       CMP:   Lab Results   Component Value Date     10/23/2020    K 4.7 10/23/2020     10/23/2020    CO2 25 10/23/2020    BUN 16 10/23/2020    CREATININE 0.6 10/23/2020    CREATININE 0.8 10/23/2020    GFRAA >60 10/23/2020    LABGLOM >60 10/23/2020    GLUCOSE 121 10/23/2020    GLUCOSE 112 04/27/2012    PROT 6.9 10/23/2020    CALCIUM 9.6 10/23/2020    BILITOT 0.5 10/23/2020    ALKPHOS 79 10/23/2020    AST 26 10/23/2020    ALT 22 10/23/2020       POC Tests:   Recent Labs     10/23/20  1220   POCGLU 143*   POCNA 142   POCK 4.6   POCCL 107       Coags:   Lab Results   Component Value Date    PROTIME 11.4 10/23/2020    INR 1.0 10/23/2020    APTT 26.3 10/23/2020       HCG (If Applicable): No results found for: PREGTESTUR, PREGSERUM, HCG, HCGQUANT     ABGs: No results found for: PHART, PO2ART, KYJ5YMZ, TJU4NSR, BEART, U3NNDAVK     Type & Screen (If Applicable):  No results found for: LABABO, LABRH    Drug/Infectious Status (If Applicable):  No results found for: HIV, HEPCAB    COVID-19 Screening (If Applicable): No results found for: COVID19      Anesthesia Evaluation   no history of anesthetic complications:   Airway: Mallampati: II  TM distance: >3 FB   Neck ROM: full  Mouth opening: > = 3 FB Dental: normal exam         Pulmonary:Negative Pulmonary ROS and normal exam  breath sounds clear to auscultation                             Cardiovascular:  Exercise tolerance: good (>4 METS),           Rhythm: regular  Rate: normal                    Neuro/Psych:   (+) CVA (acute CVA):,             GI/Hepatic/Renal: Neg GI/Hepatic/Renal ROS            Endo/Other:    (+) hypothyroidism::., .                 Abdominal:           Vascular:                                        Anesthesia Plan      MAC     ASA 3 - emergent       Induction: intravenous.   arterial line  MIPS: Prophylactic antiemetics administered. Anesthetic plan and risks discussed with patient. Plan discussed with CRNA.                   Job Echevarria DO   10/23/2020

## 2020-10-24 PROBLEM — E78.5 HLD (HYPERLIPIDEMIA): Status: ACTIVE | Noted: 2020-01-01

## 2020-10-24 PROBLEM — G93.6 CEREBRAL EDEMA (HCC): Status: ACTIVE | Noted: 2020-01-01

## 2020-10-24 NOTE — PROGRESS NOTES
Interventional Neurology, Vascular and Neuro critical Care      STROKE NEUROLOGY PROGRESS NOTE    CONSULT REQUESTED BY  Attending: Olga Eller MD   Admitting:@     Patient:   Dylan Amador   : 1955   MRN: [id number]   Length of Stay:  admission days. @   Date of Service: [unfilled]   -----------------------------------------------------------------------------------------------------------------  Stroke Team Contact numbers:  Henry Siu MD : Please contact via [facility] consultants  Brennon Wu: [phone]   Jaylan Mccord [phone]  -----------------------------------------------------------------------------------------------------------------    Consult Requested By: Attending physician/ED physician  Reason for Consultation: STROKE /STROKE LIKE SYMPTOMS        No bleed on follow up CT scan - 10/24  Read per radiology is an overcall/ misread of contrast staining of infarcted tissue as bleed. Impression:        1  Acute ischemic stroke in Right ICA and Right MCA distribution due to Right ICA occlusion atheroembolic, artery to artery  2. NO BLEED/ No hemorrhagic conversion  3. Cytotoxic cerebral edema+        Plan:    Interventions  TpA - not a candidate  Endovascular - TICI 3 recanalization       consult for following patient, incase he needs hemicrani down the road,     3% infusion , bolus 50cc followed by 75 cc /hour  RN informed  Target -155  Osm 310-320    Avoid dextrose containing fluids!!! Imaging  MRI- not needed  Recent CT 10/24/2020   Echo- not done    Medications:  Antiplatelet: 460WW rectal  Anticoagulation:not indicated  Statin: Lipitor 40 mg  Blood Pressure Goals:   <140/90  DVT prophylaxis: Lovenox today 10/24/2020    Primary Team:  NIH neuro checks q2h. Call ME if increase of NIH score by 4, sudden HA or decreased LOC. Stat CT w/o contrast recommended.   Cardiac Telemetry  CBC/BMP/PT/INR  HbA1c/Lipid panel  Glucose goal < 180 mg/dl  ST/ PT/OT to eval and treat    Stroke Discharge Plan:   Antithrombotic/Antiplatelet: ASA + plavix ( will eventually start plavix once he is on the stroke unit- hold for now in case he needs hemicrani)  Statin: Lipitor 40mg  Event Monitor at discharge: NO   F/u appointments: with me/Edel Ac    SUBJECTIVE  Interval History:doing well, sleepy , CT head this am shows edema, but no hemorrhage    Patient was seen and examined this AM.                 REVIEW OF SYSTEMS:    Constitutional: Denies fever, weight loss, fatigue and night sweats. Neurological: Denies headache, confusion, sleep difficulties, lightheadedness, spinning sensation, vision loss, tremor, weakness, numbness or tingling, incoordination, imbalance, and incontinence of bowel and sexual dysfunction. Psychiatric: Denies anxiety, depression and hallucinations. Eyes: Denies blurred vision, double vision and eye pain. ENMT: Denies difficulty swallowing, dental pain, hearing loss, and tinnitus. Cardiovascular: Denies chest pain and palpitations. Respiratory: Denies shortness of breath. Genitourinary: Denies urinary incontinence and retention. Integumentary: Denies rashes. Endocrine: Denies polydipsia and polyuria. Past Medical History:  Past Medical History:   Diagnosis Date    Allergic rhinitis     had allergy shots    Cancer ([facility] [address]     leukemia    Gout     Thyroid disease          surgical history on file.   Past Surgical History:   Procedure Laterality Date    BACK SURGERY      [facility]    IR MECHANICAL ART THROMBECTOMY INTRACRANIAL  10/23/2020    IR MECHANICAL ART THROMBECTOMY INTRACRANIAL 10/23/2020 [facility]    TUMOR EXCISION      melanoma on the back         Current Medication colchicine (COLCRYS) tablet 0.6 mg, Daily  Febuxostat TABS 80 mg, Daily  levothyroxine (SYNTHROID) tablet 50 mcg, Daily  aspirin chewable tablet 81 mg, Daily  atorvastatin (LIPITOR) tablet 40 mg, Nightly  lidocaine PF 1 % injection 5 mL, Once  sodium chloride flush 0.9 % injection 10 mL, PRN  heparin flush 100 UNIT/ML injection 300 Units, 2 times per day  heparin flush 100 UNIT/ML injection 300 Units, PRN  sodium chloride 3 % solution, Continuous  niCARdipine (CARDENE) 50 mg in sodium chloride 0.9 % 250 mL infusion, Continuous  sodium chloride flush 0.9 % injection 10 mL, 2 times per day  sodium chloride flush 0.9 % injection 10 mL, PRN  acetaminophen (TYLENOL) tablet 650 mg, Q6H PRN    Or  acetaminophen (TYLENOL) suppository 650 mg, Q6H PRN  polyethylene glycol (GLYCOLAX) packet 17 g, Daily PRN  ondansetron (ZOFRAN) injection 4 mg, Q6H PRN  hydrALAZINE (APRESOLINE) injection 10 mg, Q10 Min PRN  labetalol (NORMODYNE;TRANDATE) injection 10 mg, Q10 Min PRN            Allergies  Allergies   Allergen Reactions    Bactrim [Sulfamethoxazole-Trimethoprim] Rash        Family History      Problem Relation Age of Onset    Coronary Art Dis Father         aged 61    Breast Cancer Mother     Mult Sclerosis Sister          Social History  Social History     Tobacco History     Smoking Status  Never Smoker    Smokeless Tobacco Use  Never Used          Alcohol History     Alcohol Use Status  No Comment  social          Drug Use     Drug Use Status  No          Sexual Activity     Sexually Active  Not Asked                        Physical Exam    I/O last 3 completed shifts: In: 2043 [I.V.:1145; IV Piggyback:898]  Out: 8444 [Urine:1195]  No intake/output data recorded. Vitals:    10/24/20 0400 10/24/20 0500 10/24/20 0600 10/24/20 0700   BP:   132/78 (!) 140/78   Pulse: 94 88 85 85   Resp: 15 17 24 20   Temp: 98.4 °F (36.9 °C)      TempSrc: Temporal      SpO2: 95% 96% 94% 96%   Weight:       Height:           Physical Exam      NEUROLOGICAL EXAM    Constitutional: Well developed, well nourished and in no acute distress.    Vitals:        Respiratory: Clear to auscultation bilaterally with no use of accessory muscles utilized to reduce the radiation dose to as low as reasonably achievable. COMPARISON: CT head without contrast from earlier today, 12:18 hours. HISTORY: ORDERING SYSTEM PROVIDED HISTORY: post stroke intervention TECHNOLOGIST PROVIDED HISTORY: Reason for exam:->post stroke intervention Has a \"code stroke\" or \"stroke alert\" been called? ->Yes FINDINGS: BRAIN/VENTRICLES: In the interim since the CT from earlier today, approximately 45 minutes ago, the patient has undergone mechanical thrombectomy. Hyperdensity seen in the right frontal lobe, basal ganglia, thalamus, insula and anterior temporal lobar regions likely represents contrast staining. Findings indicate an evolving right MCA infarction. Edema results in mass effect on the right lateral ventricle, which is partially effaced. Minimal right-to-left shift at the level of the septum pellucidum by approximately 2 mm. ORBITS: The visualized portion of the orbits demonstrate no acute abnormality. SINUSES: Prominent mucosal thickening is seen in the ethmoid and right maxillary sinus. Mild mucosal thickening is seen in the remainder of the paranasal sinuses. The mastoids are clear. SOFT TISSUES/SKULL:  No acute abnormality of the visualized skull or soft tissues. 1. Hyperdensity in the right MCA distribution likely represents contrast staining from recent interventional procedure. 2. Mild edema in the right cerebral hemisphere resulting in minimal right to left shift of approximately 2 mm. Ct Head Wo Contrast    Result Date: 10/23/2020  EXAMINATION: CT OF THE HEAD WITHOUT CONTRAST  10/23/2020 12:28 pm TECHNIQUE: CT of the head was performed without the administration of intravenous contrast. Dose modulation, iterative reconstruction, and/or weight based adjustment of the mA/kV was utilized to reduce the radiation dose to as low as reasonably achievable. COMPARISON: None.  HISTORY: ORDERING SYSTEM PROVIDED HISTORY: CVA TECHNOLOGIST PROVIDED HISTORY: Has a \"code stroke\" or \"stroke alert\" been called? ->Yes Reason for exam:->CVA What reading provider will be dictating this exam?->CRC FINDINGS: BRAIN/VENTRICLES: There is no acute intracranial hemorrhage, or midline shift. No abnormal extra-axial fluid collection. There is no evidence of hydrocephalus. There low-attenuation in medial aspect of the right temporal suspicious of acute infarct. The right MCA is hyperdense relative to the left suspicious of an acute right MCA thrombus ORBITS: The visualized portion of the orbits demonstrate no acute abnormality. SINUSES: The visualized paranasal sinuses and mastoid air cells demonstrate no acute abnormality. There is some mucosal thickening maxillary and sphenoid sinuses and extensive mucosal thickening in ethmoid air cells SOFT TISSUES/SKULL:  No acute abnormality of the visualized skull or soft tissues. Findings suspicious significant right MCA infarct. Hyperdensity in the right MCA suggest thrombus. Significant amount of mucosal thickening in the sinuses particularly ethmoid air cells. Critical finding: Results were called to Dr. Dior Abraham  on 10/23/2020 at 1250 hours     Xr Chest Portable    Result Date: 10/23/2020  EXAMINATION: ONE XRAY VIEW OF THE CHEST 10/23/2020 11:58 am COMPARISON: 2018 HISTORY: ORDERING SYSTEM PROVIDED HISTORY: weakness, Possible Stroke TECHNOLOGIST PROVIDED HISTORY: Reason for exam:->weakness, Possible Stroke What reading provider will be dictating this exam?->CRC FINDINGS: Previous right upper lobe airspace consolidation has resolved. There are diffuse prominent reticular markings, which may be related to interstitial edema. No airspace consolidation, effusion, or pneumothorax. Cardiac silhouette is stable. No acute osseous abnormality. Diffuse prominent reticular markings may be related to interstitial edema. No airspace consolidation.      Cta Neck W Contrast    Result Date: 10/23/2020  Patient MRN:  [id number] : 1955 Age: 60 years Gender: Male Order Date:  10/23/2020 12:16 PM EXAM: CTA NECK W CONTRAST, CTA HEAD W CONTRAST NUMBER OF IMAGES:  808 INDICATION:  CVA CVA COMPARISON: None Technique: Low-dose CT  acquisition technique included one of following options; 1 . Automated exposure control, 2. Adjustment of MA and or KV according to patient's size or 3. Use of iterative reconstruction. Contiguous spiral images were obtained in the axial plane, following the administration of intravenous contrast using CT angiographic protocol. Sagittal and coronal images were reconstructed from the axial plane acquisition. Additional MIP reconstructions were presented to aid in the interpretation of this study. Images were obtained from the skull base cranially. There is evidence for moderate ethmoid sinusitis There is severe calcified plaque identified in the vessels compatible with atherosclerotic disease. There is aortic arch and great vessels demonstrate moderate atherosclerotic disease. The right carotid is occluded The left carotid is abnormal. There is no evidence for hemodynamically significant stenosis at the level the proximal internal carotid artery. 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 The basilar artery is unremarkable. The middle cerebral arteries are abnormal the right middle cerebral artery is occluded . The right M1 and M2 segments appear occluded The anterior cerebral arteries are unremarkable. The posterior cerebral arteries are unremarkable. 1.  Occluded right internal carotid artery from the posterior margin through the right M1 and likely into segment . Occluded right vertebral artery from the origin to the basilar artery.  2. Estimated stenosis of the proximal left internal carotid artery by NASCET criteria is not hemodynamically significant     Ct Brain Perfusion    Result Date: 10/23/2020  Patient MRN: [id number] : 1955 Age:  60 years Gender: Male Order Date: 10/23/2020 12:16 PM Exam: CT BRAIN PERFUSION Number of Images: 797 views Indication:   CVA CVA What reading provider will be dictating this exam?->[facility] Comparison: None. Findings: Perfusion images demonstrate asymmetric blood volume Blood flow images demonstrate asymmetric blood flow There is peripheral ischemia There is a large core infarct     There is a large core infarct exceeding 50% in the region of the distribution of the middle cerebral artery with a relatively small region of peripheral ischemia     Cta Head W Contrast    Result Date: 10/23/2020  Patient MRN:  [id number] : 1955 Age: 60 years Gender: Male Order Date:  10/23/2020 12:16 PM EXAM: CTA NECK W CONTRAST, CTA HEAD W CONTRAST NUMBER OF IMAGES:  808 INDICATION:  CVA CVA COMPARISON: None Technique: Low-dose CT  acquisition technique included one of following options; 1 . Automated exposure control, 2. Adjustment of MA and or KV according to patient's size or 3. Use of iterative reconstruction. Contiguous spiral images were obtained in the axial plane, following the administration of intravenous contrast using CT angiographic protocol. Sagittal and coronal images were reconstructed from the axial plane acquisition. Additional MIP reconstructions were presented to aid in the interpretation of this study. Images were obtained from the skull base cranially. There is evidence for moderate ethmoid sinusitis There is severe calcified plaque identified in the vessels compatible with atherosclerotic disease. There is aortic arch and great vessels demonstrate moderate atherosclerotic disease. The right carotid is occluded The left carotid is abnormal. There is no evidence for hemodynamically significant stenosis at the level the proximal internal carotid artery.  By NASCET criteria estimated stenosis is 50% or less procedure including but not limited to intracranial hemorrhage, Stroke , MI and death, the patient was brought to the angiography suite, family and was aware that this procedure was offered on a humanitarian basis. Both groins are prepped and draped in usual sterile manner. Vascular access was obtained in the right common femoral artery with a 9-Swazi sheath which was connected to continuous heparinized saline flush. A 6-Swazi Neuron MAX guide catheter was prepped and with the support of a diagnostic catheter was brought into the aorta, double flushed and connected to continuous heparinized saline flush. The  guide catheter was then telescoped into the Right common carotid artery. Fluoroscopic imaging performed at that time confirmed the presence of previously documented arterial occlusion and no visualization of the right internal carotid artery. Aspiration thrombectomy was performed through the Max initially and subsequently through the JET 7 cathter. Angiographic imaging obtained following this showed distal occlusion in the carotid terminus hence the JET 7 was advanced and continuous aspiration was performed with intravenous and suction performed through Vac-Gabriel syringe and through the penumbra suction system . Post this pass recanalization was TICI 2B with residual occlusive thrombus seen in the MCA. Following this the aspiration catheter was taken out into the MCA and aspiration thrombectomy was performed. Continuous aspiration was performed during the retrieval process. Post retrieval digital subtraction images were obtained and showed revascularization. The catheters were removed from the patient and the groin sheath was exchanged for a 8-Swazi Angio-Seal device. Hemostasis was achieved with the deployment of Angio-Seal device. The patient was transferred to the CT area with no immediately apparent complication from the procedure.  THROMBECTOMY TIME STAMPS: Preprocedure NIH stroke scale: 22 prior to being placed on the table Time in the room: 1303 Vascular access time: 1320 Time of 1st angiographic assessment: 1326 Preprocedure perfusion TICI grade:TICI 0 with complete occlusion of the distal common carotid with nonvisualization of the internal carotid Time device deployed/ First Pass: 1328 Time of first recanalization of lesion #1: 1330 Intracranial passes after carotid recanalization Seconds pass 1335 TICI 2b to be third pass 1341 TICI 3 Postprocedure perfusion grade:TICI 3 Time of vascular closure:1343 Postprocedure NH stroke scale:not assessed due to sedation significant INTERPRETATION OF IMAGES: 1. Right common/internal carotid artery injections: There is complete occlusion of the distal right common carotid artery/right internal carotid artery origin. This was noticed on roadmap imaging and aspiration was performed. Postaspiration there is occlusion noticed distally in the intracranial ICA portion with no flow seen distally. Tandem occlusion in the MCA was also noticed post thrombectomy. 2.  Postprocedure right internal carotid artery injections: There was complete restoration of flow in the Right middle cerebral artery with no evidence of contrast extravasation or other vascular complications. And complete restoration of flow to the whole right internal carotid artery. IMPRESSION RIGHT middle cerebral artery occlusion and right internal carotid artery occlusion status post successful mechanical thrombectomy. with TICI 3 recanalization RECOMMENDATIONS Post operative CT head Please see consult note placed in chart. 24 hour CT head /MRI Brain Q1 Neurochecks Neurovascular checks Patients: If you have questions regarding some of the verbiage in your report, please visit [email or web address] for a definition. If you have any other questions, please contact your physician.            LABS:    CBC:   Lab Results   Component Value Date    WBC 17.9 10/24/2020    RBC 4.16 10/24/2020    HGB 13.2 10/24/2020 HCT 39.7 10/24/2020    MCV 95.4 10/24/2020    MCH 31.7 10/24/2020    MCHC 33.2 10/24/2020    RDW 12.8 10/24/2020     10/24/2020    MPV 10.0 10/24/2020     CMP:    Lab Results   Component Value Date     10/24/2020    K 3.8 10/24/2020     10/24/2020    CO2 24 10/24/2020    BUN 13 10/24/2020    CREATININE 0.7 10/24/2020    GFRAA >60 10/24/2020    LABGLOM >60 10/24/2020    GLUCOSE 138 10/24/2020    GLUCOSE 112 04/27/2012    PROT 6.9 10/23/2020    LABALBU 4.3 10/23/2020    LABALBU 4.8 04/27/2012    CALCIUM 8.9 10/24/2020    BILITOT 0.5 10/23/2020    ALKPHOS 79 10/23/2020    AST 26 10/23/2020    ALT 22 10/23/2020     HgBA1c:    Lab Results   Component Value Date    LABA1C 5.8 06/04/2020     FLP:    Lab Results   Component Value Date    TRIG 127 06/04/2020    HDL 47 06/04/2020    LDLCALC 181 06/04/2020    LABVLDL 25 06/04/2020        CRITICAL CARE BILLING  A total of 52 minutes of critical care time was spent addressing central nervous system failure. Neurocritical care elements considered include risk of cerebral edema and management , evaluation of risk of cerebral bleeding, Secondary prevention and prevention of complications  associated with ICU stay. This time was also spent reviewing the patient's case, discussing with other physicians, evaluating imaging, and direct patient care. Greater than 30 % of the time was spent on direct, face to face,  patient care and counseling.

## 2020-10-24 NOTE — CONSULTS
[address]                  [address] [place],  [facility]                                  CONSULTATION    PATIENT NAME: Emiliano Sandhoff                      :        1955  MED REC NO:   [id number]                            ROOM:       1072  ACCOUNT NO:   [de-identified]                           ADMIT DATE: 10/23/2020  PROVIDER:     Bassem Shannon MD    CONSULT DATE:  10/24/2020    CHIEF COMPLAINT:  Left-sided weakness. HISTORY OF PRESENT ILLNESS:  This 71-year-old male apparently had sudden  onset of left-sided weakness. He was brought to the hospital, was  evaluated by the neurology team, and subsequently underwent management  of his thrombus by Dr. Svetlana Brown.  The patient is doing  better. The patient was found to have occluded right internal carotid  artery. Once the Interventional Radiology opened the blood vessel,  subsequently he had a CT scan of the brain which revealed edema in the  distribution of right middle cerebral artery with a slight midline  shift. I was consulted to keep an eye on the patient)s neurological  status and if needed a decompressive craniectomy. PAST MEDICAL HISTORY:  Allergic rhinitis, cancer, gout, and thyroid  disease. PAST SURGICAL HISTORY:  Back surgery, and tumor  excision. PERSONAL HISTORY:  Allergy to BACTRIM. SOCIAL HISTORY:  Not a smoker. Does not use alcohol or street drugs. PHYSICAL EXAMINATION:  GENERAL:  He is a well-developed, well-nourished male, who is not  completely oriented to time and place. He does answer quite a few  questions appropriately. He can count the fingers well and he has  weakness on the left side. I had reviewed the CT scan of the brain, which revealed ischemic CVA  involving the right middle cerebral artery distribution with midline  shift. RECOMMENDATION:  We will observe him closely.   Unless the patient's  neurological status deteriorates, we will continue to monitor, and if  needed we will consider decompressive craniectomy.         Loida Crow MD    D: 10/24/2020 15:05:11       T: 10/24/2020 15:58:58     [email or web address]  Job#: [id number]     Doc#: [id number]    CC:

## 2020-10-24 NOTE — PROGRESS NOTES
[facility]  Critical Care Consult 10/24/2020      Date of Admission: 10/23    Events:  10/23: IR thrombectomy for R ICA and R MCA ischemic stroke  10/24: More alert, answering questions appropriately, afebrile, VSS    VITAL SIGNS:   BP (!) 141/76   Pulse 85   Temp 98.3 °F (36.8 °C) (Oral)   Resp 18   Ht 6' 2\" (1.88 m)   Wt 220 lb (99.8 kg)   SpO2 95%   BMI 28.25 kg/m²     PHYSICAL EXAM:    General appearance:  Comfortable. GCS:    3 - Opens eyes to loud noise or command   6 - Follows simple motor commands  5 - Alert and oriented      Pupil size:  Left 3 mm    Right 3 mm  Pupil reaction: Yes  Wiggles fingers: Left No Right No  Hand grasp:   Left absent    Right absent  Wiggles toes: Left No    Right No  Plantar flexion: Left absent   Right absent    CONSTITUTIONAL: no acute distress, lying in hospital bed. Alert and cooperative   NEUROLOGIC: PERRL, flaccid left upper and lower extremities, responds to pain with right upper and lower  CARDIOVASCULAR: S1 S2, regular rate, regular rhythm, no murmur/gallop/rub. Monitor:sinus  PULMONARY: no rhonchi/rales/wheezes, no use of accessory muscles, RA  RENAL: whitehead to gravity, clear yellow urine  ABDOMEN: soft, nontender, nondistended, nontympanic, no masses, no organomegaly, normal bowel sounds   SKIN/EXTREMITIES: no rashes/ecchymosis, no edema/clubbing, warm/dry, good capillary refill     Assessment & Plan:       · Neuro:  Right ischemic stroke with occlusion s/p thrombectomy. Monitor neuro status, Neurosurgery following, 3 %  · CV: Hypertensive. Monitor hemodynamics. BP goal 120-150 PRN hydralazine & labetalol. Cardene  · Pulm: No acute issues. Monitor RR & SpO2. Pulmonary hygiene. -155, serum Osmo 310-320  · GI: Dysphagia. Gastric tube. NPO. Speech. Monitor bowel function. · Renal:  No acute issues. Monitor BUN & Cr. Monitor electrolytes & replace as needed. Monitor urine output.    · ID: No acute issues  · Endocrine: No acute issues. Monitor BS. · MSK: No acute issues. ROM. turn & reposition. · Heme: No acute issues. Monitor CBC. Bowel regime: Glycolax   Pain control/Sedation:  Tylenol  DVT prophylaxis: SCDs. Lovenox   Mouth/Eye care: as needed  [place]: Keep in place for critical care monitoring of fluid balance. Family update:  Wife updated at bedside   Code status:  Full    Disposition:  ICU      Electronically signed by SHANIA Dorado CNP on 10/24/2020 at 11:19 AM

## 2020-10-24 NOTE — H&P
[address] [facility]  History and Physical      CHIEF COMPLAINT: CVA      HISTORY OF PRESENT ILLNESS:      The patient is a 60 y.o. male patient of Dr Jose F Edwards history of leukemia, gout, hypothyroid who presents with acute CVA. Patient presents emergency department by EMS with left-sided weakness yesterday. He was last seen normal previous night at 10 PM.  Was found on floor at 11 AM with left hemiplegia. Per wife patient did have left hand weakness starting about a week ago. He did not seek attention because he thought this was secondary to working too hard moving his mother. Patient was stroke team in the ED. There was telestroke consultation. patient was considered the outside TPA window. Neuro interventionalist was consulted and patient underwent mechanical thrombectomy. Pt is a poor historian. History is largely obtained from chart review and discussions with staff/family as available.    Past Medical History:    Past Medical History:   Diagnosis Date    Allergic rhinitis     had allergy shots    Cancer ([address]     leukemia    Gout     Thyroid disease        Past Surgical History:    Past Surgical History:   Procedure Laterality Date    BACK SURGERY      [facility]    IR MECHANICAL ART THROMBECTOMY INTRACRANIAL  10/23/2020    IR MECHANICAL ART THROMBECTOMY INTRACRANIAL 10/23/2020 [facility]    TUMOR EXCISION      melanoma on the back       Medications Prior to Admission:    Medications Prior to Admission: colchicine (COLCRYS) 0.6 MG tablet, Take 0.6 mg by mouth daily  Febuxostat 80 MG TABS, Take 80 mg by mouth daily  levothyroxine (SYNTHROID) 50 MCG tablet, Take 50 mcg by mouth Daily  valACYclovir (VALTREX) 1 g tablet, Take 1,000 mg by mouth daily  ibuprofen (ADVIL;MOTRIN) 800 MG tablet, Take 1 tablet by mouth every 6 hours as needed for Pain    Allergies:    Bactrim [sulfamethoxazole-trimethoprim]    Social History:    reports that he has never smoked. He has never used smokeless tobacco. He reports that he does not drink alcohol or use drugs. Family History:   family history includes Breast Cancer in his mother; Coronary Art Dis in his father; Mult Sclerosis in his sister. REVIEW OF SYSTEMS:  As above in the HPI, otherwise negative    PHYSICAL EXAM:    Vitals:  /78   Pulse 80   Temp 98.3 °F (36.8 °C) (Oral)   Resp 15   Ht 6' 2\" (1.88 m)   Wt 220 lb (99.8 kg)   SpO2 96%   BMI 28.25 kg/m²     General:   somnolent not alert, oriented X 1. Well developed, well nourished, well groomed. No apparent distress. HEENT:  Normocephalic, atraumatic. Pupils equal, round, reactive to light. No scleral icterus. No conjunctival injection. Normal lips, teeth, and gums. No nasal discharge. Neck:  Supple  Heart:  RRR, no murmurs, gallops, rubs  Lungs:  CTA bilaterally, bilat symmetrical expansion, no wheeze, rales, or rhonchi  Abdomen:   Bowel sounds present, soft, nontender, no masses, no organomegaly, no peritoneal signs  Extremities:  No clubbing, cyanosis, or edema  Skin:  Warm and dry, no open lesions or rash  Neuro: Left hemiparesis  Breast: deferred  Rectal: deferred  Genitalia:  deferred    LABS:    CBC with Differential:    Lab Results   Component Value Date    WBC 17.9 10/24/2020    RBC 4.16 10/24/2020    HGB 13.2 10/24/2020    HCT 39.7 10/24/2020     10/24/2020    MCV 95.4 10/24/2020    MCH 31.7 10/24/2020    MCHC 33.2 10/24/2020    RDW 12.8 10/24/2020    SEGSPCT 21 04/27/2012    LYMPHOPCT 15.8 10/23/2020    MONOPCT 6.2 10/23/2020    BASOPCT 0.9 10/23/2020    MONOSABS 0.62 10/23/2020    LYMPHSABS 1.59 10/23/2020    EOSABS 0.33 10/23/2020    BASOSABS 0.09 10/23/2020     CMP:    Lab Results   Component Value Date     10/24/2020    K 3.8 10/24/2020     10/24/2020    CO2 24 10/24/2020    BUN 13 10/24/2020    CREATININE 0.7 10/24/2020    GFRAA >60 10/24/2020    LABGLOM >60 10/24/2020    GLUCOSE 138 10/24/2020    GLUCOSE 112 04/27/2012    PROT 6.9 10/23/2020    LABALBU 4.3 10/23/2020    LABALBU 4.8 04/27/2012    CALCIUM 8.9 10/24/2020    BILITOT 0.5 10/23/2020    ALKPHOS 79 10/23/2020    AST 26 10/23/2020    ALT 22 10/23/2020     Magnesium:    Lab Results   Component Value Date    MG 1.9 10/24/2020     Phosphorus:    Lab Results   Component Value Date    PHOS 3.4 10/24/2020     PT/INR:    Lab Results   Component Value Date    PROTIME 11.4 10/23/2020    INR 1.0 10/23/2020     Last 3 Troponin:  No results found for: TROPONINI  U/A:  No results found for: NITRITE, COLORU, PROTEINU, PHUR, LABCAST, WBCUA, RBCUA, MUCUS, TRICHOMONAS, YEAST, BACTERIA, CLARITYU, SPECGRAV, LEUKOCYTESUR, UROBILINOGEN, BILIRUBINUR, BLOODU, GLUCOSEU, AMORPHOUS  ABG:  No results found for: PH, PCO2, PO2, HCO3, BE, THGB, TCO2, O2SAT  HgBA1c:    Lab Results   Component Value Date    LABA1C 5.8 06/04/2020     FLP:    Lab Results   Component Value Date    TRIG 127 06/04/2020    HDL 47 06/04/2020    LDLCALC 181 06/04/2020    LABVLDL 25 06/04/2020     TSH:    Lab Results   Component Value Date    TSH 2.920 06/04/2020       CT HEAD WO CONTRAST   Final Result   1. Diffuse pattern of cytotoxic edema in the right cerebral hemisphere in the   distribution of the anterior and middle cerebral arteries likely correlating   to recent stroke. There is sulcal effacement and mass effect upon the right   lateral ventricle. 2-3 mm subfalcine herniation from right to left. 2. No evidence of intracranial hemorrhage. Decreased density of presumed   contrast observed on prior CT. CT HEAD WO CONTRAST   Final Result   1. Hyperdensity in the right MCA distribution likely represents contrast   staining from recent interventional procedure. 2. Mild edema in the right cerebral hemisphere resulting in minimal right to   left shift of approximately 2 mm.          IR MECHANICAL ART THROMBECTOMY INTRACRANIAL   Final Result      XR CHEST PORTABLE   Final Result   Diffuse prominent reticular markings may be related to interstitial edema. No airspace consolidation. CTA NECK W CONTRAST   Final Result   1. Occluded right internal carotid artery from the posterior margin   through the right M1 and likely into segment . Occluded right   vertebral artery from the origin to the basilar artery. 2. Estimated stenosis of the proximal left internal carotid artery by   NASCET criteria is not hemodynamically significant               CT BRAIN PERFUSION   Final Result      There is a large core infarct exceeding 50% in the region of the   distribution of the middle cerebral artery with a relatively small   region of peripheral ischemia            CTA HEAD W CONTRAST   Final Result   1. Occluded right internal carotid artery from the posterior margin   through the right M1 and likely into segment . Occluded right   vertebral artery from the origin to the basilar artery. 2. Estimated stenosis of the proximal left internal carotid artery by   NASCET criteria is not hemodynamically significant               CT HEAD WO CONTRAST   Final Result   Findings suspicious significant right MCA infarct. Hyperdensity in the right   MCA suggest thrombus. Significant amount of mucosal thickening in the sinuses particularly ethmoid   air cells. Critical finding: Results were called to Dr. Venecia Cabrales  on 10/23/2020 at 1250   hours             ASSESSMENT:      Principal Problem:    Stroke, acute, thrombotic ([address]  Active Problems:    Acquired hypothyroidism    Diabetes mellitus type 2 in nonobese ([address]    HLD (hyperlipidemia)    Cerebral edema ([address]  Resolved Problems:    * No resolved hospital problems.  *      PLAN:    36-year-old male history of diabetes hypertension hyperlipidemia, hypothyroidism admitted with acute right MCA stroke status post mechanical thrombectomy admitted to ICU    3% saline for intracranial edema  ASA  Nicardipine drip  High intensity statin  Check fasting lipid panel  Check hemoglobin A1c  Neurology consult appreciated  Critical care consult appreciated  Medications for other co morbidities cont as appropriate w dosage adjustments as necessary   PT/OT  DVT PPx  DC planning pending stabilization of medical conditions        Electronically signed by Dipak Ramírez MD on 10/24/2020 at 8:44 AM

## 2020-10-25 NOTE — PROGRESS NOTES
Interventional Neurology, Vascular and Neuro critical Care      STROKE NEUROLOGY PROGRESS NOTE    CONSULT REQUESTED BY  Attending: Jareth Maya MD   Admitting:@     Patient:   Bart Levin   : 1955   MRN: [id number]   Length of Stay:  admission days. Date of Service: 10/25/20  -----------------------------------------------------------------------------------------------------------------  Stroke Team Contact numbers:  Catie Knox MD :  Amie Ramos: [phone]   Ganga Langley [phone]  -----------------------------------------------------------------------------------------------------------------    Consult Requested By: Attending physician/ED physician  Reason for Consultation: STROKE /STROKE LIKE SYMPTOMS        No bleed on follow up CT scan - 10/25 shows no bleed  Slight increase in midline shift 4.5mm warranting hypertonic saline continuation               Impression:        1  Acute ischemic stroke in Right ICA and Right MCA distribution due to Right ICA occlusion atheroembolic, artery to artery  2. NO BLEED/ No hemorrhagic conversion  3. Cytotoxic cerebral edema+ midline shift         Plan:    Interventions  TpA - not a candidate  Endovascular - TICI 3 recanalization       consult for following patient, incase he needs hemicrani down the road, consult noted, appreciate recs    3% infusion , Continue At current rate 100cc/hour, target goal has been reached  RN informed  Target -155  Osm 310-320    Avoid dextrose containing fluids!!! Imaging  MRI- not needed  Recent CT 10/25 - slight increase in midline shift , no bleed    Echo- not done, ordered    Medications:  Antiplatelet: 252VJ rectal/ 81 mg oral  Anticoagulation:not indicated  Statin: Lipitor 40 mg  Blood Pressure Goals:   <140/90  DVT prophylaxis: Lovenox start 10/24/2020    Primary Team:  NIH neuro checks q2h. Call ME if increase of NIH score by 4, sudden HA or decreased LOC.  Stat CT w/o contrast flush 0.9 % injection 10 mL, PRN  acetaminophen (TYLENOL) tablet 650 mg, Q6H PRN    Or  acetaminophen (TYLENOL) suppository 650 mg, Q6H PRN  polyethylene glycol (GLYCOLAX) packet 17 g, Daily PRN  ondansetron (ZOFRAN) injection 4 mg, Q6H PRN  hydrALAZINE (APRESOLINE) injection 10 mg, Q10 Min PRN  labetalol (NORMODYNE;TRANDATE) injection 10 mg, Q10 Min PRN            Allergies  Allergies   Allergen Reactions    Bactrim [Sulfamethoxazole-Trimethoprim] Rash        Family History      Problem Relation Age of Onset    Coronary Art Dis Father         aged 61    Breast Cancer Mother     Mult Sclerosis Sister          Social History  Social History     Tobacco History     Smoking Status  Never Smoker    Smokeless Tobacco Use  Never Used          Alcohol History     Alcohol Use Status  No Comment  social          Drug Use     Drug Use Status  No          Sexual Activity     Sexually Active  Not Asked                        Physical Exam    I/O last 3 completed shifts: In: 2996 [I.V.:377; NG/GT:110; IV Piggyback:2509]  Out: 4760 [Urine:4760]  I/O this shift:  In: 60 [NG/GT:60]  Out: 80 [Urine:80]    Vitals:    10/25/20 0700 10/25/20 0800 10/25/20 0810 10/25/20 0830   BP: 125/70  (!) 157/81 138/74   Pulse: 83  90    Resp: 17  14    Temp:  98.2 °F (36.8 °C)     TempSrc:       SpO2: 94%  94%    Weight:       Height:           Physical Exam      NEUROLOGICAL EXAM    Constitutional: Well developed, well nourished and in no acute distress. Vitals:        Respiratory: Clear to auscultation bilaterally with no use of accessory muscles during respiration. Cardiovascular:  No murmurs auscultated. Carotid arteries without bruits. Pedal pulses and radial pulses 2+ bilaterally. No edema in all four extremities. Mental Status:    Alert and oriented to person, place, and time.   Recent and remote memory: Intact  Attention and concentration: Intact  Speech and language : SLURRED no aphasia      Cranial Nerves:     II: Visual Fields: Full to confrontation bilaterally prefers right, some vsual neglect +         III, IV, VI: Pupils: equally round and reactive to light, 3  to 2  mm bilaterally. EOMs: full with no nystagmus. V: Sensation intact to light touch and pin prick sensation bilaterally  VII: left facial droop  VIII: Hearing intact to finger rub bilaterally   IX,X: Palate elevates symmetrically. XI: head turn (sternocleidomastoid) and shoulder shrug (trapezius) 5/5 bilaterally   XII: Tongue is midline upon protrusion    Motor:   Normal tone and bulk. Upper Extremity Right Left  Lower Extremity Right Left  Deltoid              5 1      Hip Flexion             5 1  Biceps              5 1   Hip Extension             5 1  Triceps                      5 1   Hip Adduction             5 1  Wrist Extension 5 1   Knee Extension 5 1  Wrist Flexion           5 1                Knee Flexion 5 1  Index Finger Flexion 5 1  Ankle Dorsiflexion 5 1  Finger Extension 5 1  Ankle Plantarflexion 5 1  APB                        5 1   Extensor Hallucis Longus 5 1         Deep Tendon Reflexes:    Not tested    Sensory:  Feels pain on the left                                            IMAGING  Ct Head Wo Contrast    Result Date: 10/24/2020  EXAMINATION: CT OF THE HEAD WITHOUT CONTRAST  10/24/2020 5:02 am TECHNIQUE: CT of the head was performed without the administration of intravenous contrast. Dose modulation, iterative reconstruction, and/or weight based adjustment of the mA/kV was utilized to reduce the radiation dose to as low as reasonably achievable. COMPARISON: 10/23/2020 CT HISTORY: ORDERING SYSTEM PROVIDED HISTORY: follow up post stroke, post thrombectomy TECHNOLOGIST PROVIDED HISTORY: Reason for exam:->follow up post stroke, post thrombectomy Has a \"code stroke\" or \"stroke alert\" been called? ->No What reading provider will be dictating this exam?->CRC FINDINGS: BRAIN/VENTRICLES: Near complete resolution of high attenuation in the right cerebral hemisphere that was felt to be sequela of contrast during an interventional procedure. On the current exam, there is no evidence of acute intracranial hemorrhage. The patient underwent right carotid thrombectomy on 10/23/2020. There is diffuse cytotoxic edema in the right cerebral hemisphere in the distribution of the anterior and middle cerebral arteries. Sulcal effacement and subfalcine herniation from right to left measuring 2-3 mm. There is also compression of the right lateral ventricle. Left cerebral hemisphere is normal. ORBITS: The visualized portion of the orbits demonstrate no acute abnormality. SINUSES: Moderate mucosal thickening throughout the ethmoid air cells and sphenoid sinuses. SOFT TISSUES/SKULL:  No acute abnormality of the visualized skull or soft tissues. 1. Diffuse pattern of cytotoxic edema in the right cerebral hemisphere in the distribution of the anterior and middle cerebral arteries likely correlating to recent stroke. There is sulcal effacement and mass effect upon the right lateral ventricle. 2-3 mm subfalcine herniation from right to left. 2. No evidence of intracranial hemorrhage. Decreased density of presumed contrast observed on prior CT. Ct Head Wo Contrast    Result Date: 10/23/2020  EXAMINATION: CT OF THE HEAD WITHOUT CONTRAST  10/23/2020 2:05 pm TECHNIQUE: CT of the head was performed without the administration of intravenous contrast. Dose modulation, iterative reconstruction, and/or weight based adjustment of the mA/kV was utilized to reduce the radiation dose to as low as reasonably achievable. COMPARISON: CT head without contrast from earlier today, 12:18 hours. HISTORY: ORDERING SYSTEM PROVIDED HISTORY: post stroke intervention TECHNOLOGIST PROVIDED HISTORY: Reason for exam:->post stroke intervention Has a \"code stroke\" or \"stroke alert\" been called? ->Yes FINDINGS: BRAIN/VENTRICLES: In the interim since the CT from earlier today, approximately 45 minutes ago, the patient has undergone mechanical thrombectomy. Hyperdensity seen in the right frontal lobe, basal ganglia, thalamus, insula and anterior temporal lobar regions likely represents contrast staining. Findings indicate an evolving right MCA infarction. Edema results in mass effect on the right lateral ventricle, which is partially effaced. Minimal right-to-left shift at the level of the septum pellucidum by approximately 2 mm. ORBITS: The visualized portion of the orbits demonstrate no acute abnormality. SINUSES: Prominent mucosal thickening is seen in the ethmoid and right maxillary sinus. Mild mucosal thickening is seen in the remainder of the paranasal sinuses. The mastoids are clear. SOFT TISSUES/SKULL:  No acute abnormality of the visualized skull or soft tissues. 1. Hyperdensity in the right MCA distribution likely represents contrast staining from recent interventional procedure. 2. Mild edema in the right cerebral hemisphere resulting in minimal right to left shift of approximately 2 mm. Ct Head Wo Contrast    Result Date: 10/23/2020  EXAMINATION: CT OF THE HEAD WITHOUT CONTRAST  10/23/2020 12:28 pm TECHNIQUE: CT of the head was performed without the administration of intravenous contrast. Dose modulation, iterative reconstruction, and/or weight based adjustment of the mA/kV was utilized to reduce the radiation dose to as low as reasonably achievable. COMPARISON: None. HISTORY: ORDERING SYSTEM PROVIDED HISTORY: CVA TECHNOLOGIST PROVIDED HISTORY: Has a \"code stroke\" or \"stroke alert\" been called? ->Yes Reason for exam:->CVA What reading provider will be dictating this exam?->CRC FINDINGS: BRAIN/VENTRICLES: There is no acute intracranial hemorrhage, or midline shift. No abnormal extra-axial fluid collection. There is no evidence of hydrocephalus. There low-attenuation in medial aspect of the right temporal suspicious of acute infarct.   The right MCA is hyperdense relative to the left suspicious of an acute right MCA thrombus ORBITS: The visualized portion of the orbits demonstrate no acute abnormality. SINUSES: The visualized paranasal sinuses and mastoid air cells demonstrate no acute abnormality. There is some mucosal thickening maxillary and sphenoid sinuses and extensive mucosal thickening in ethmoid air cells SOFT TISSUES/SKULL:  No acute abnormality of the visualized skull or soft tissues. Findings suspicious significant right MCA infarct. Hyperdensity in the right MCA suggest thrombus. Significant amount of mucosal thickening in the sinuses particularly ethmoid air cells. Critical finding: Results were called to Dr. Libby Dexter  on 10/23/2020 at 1250 hours     Xr Chest Portable    Result Date: 10/23/2020  EXAMINATION: ONE XRAY VIEW OF THE CHEST 10/23/2020 11:58 am COMPARISON: 2018 HISTORY: ORDERING SYSTEM PROVIDED HISTORY: weakness, Possible Stroke TECHNOLOGIST PROVIDED HISTORY: Reason for exam:->weakness, Possible Stroke What reading provider will be dictating this exam?->CRC FINDINGS: Previous right upper lobe airspace consolidation has resolved. There are diffuse prominent reticular markings, which may be related to interstitial edema. No airspace consolidation, effusion, or pneumothorax. Cardiac silhouette is stable. No acute osseous abnormality. Diffuse prominent reticular markings may be related to interstitial edema. No airspace consolidation. Cta Neck W Contrast    Result Date: 10/23/2020  Patient MRN:  [id number] : 1955 Age: 60 years Gender: Male Order Date:  10/23/2020 12:16 PM EXAM: CTA NECK W CONTRAST, CTA HEAD W CONTRAST NUMBER OF IMAGES:  808 INDICATION:  CVA CVA COMPARISON: None Technique: Low-dose CT  acquisition technique included one of following options; 1 . Automated exposure control, 2. Adjustment of MA and or KV according to patient's size or 3. Use of iterative reconstruction.  Contiguous spiral images were obtained in the axial plane, following the administration of intravenous contrast using CT angiographic protocol. Sagittal and coronal images were reconstructed from the axial plane acquisition. Additional MIP reconstructions were presented to aid in the interpretation of this study. Images were obtained from the skull base cranially. There is evidence for moderate ethmoid sinusitis There is severe calcified plaque identified in the vessels compatible with atherosclerotic disease. There is aortic arch and great vessels demonstrate moderate atherosclerotic disease. The right carotid is occluded The left carotid is abnormal. There is no evidence for hemodynamically significant stenosis at the level the proximal internal carotid artery. 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 The basilar artery is unremarkable. The middle cerebral arteries are abnormal the right middle cerebral artery is occluded . The right M1 and M2 segments appear occluded The anterior cerebral arteries are unremarkable. The posterior cerebral arteries are unremarkable. 1.  Occluded right internal carotid artery from the posterior margin through the right M1 and likely into segment . Occluded right vertebral artery from the origin to the basilar artery. 2. Estimated stenosis of the proximal left internal carotid artery by NASCET criteria is not hemodynamically significant     Ct Brain Perfusion    Result Date: 10/23/2020  Patient MRN: [id number] : 1955 Age:  60 years Gender: Male Order Date: 10/23/2020 12:16 PM Exam: CT BRAIN PERFUSION Number of Images: 329 views Indication:   CVA CVA What reading provider will be dictating this exam?->[facility] Comparison: None.  Findings: Perfusion images demonstrate asymmetric blood volume Blood flow images demonstrate asymmetric blood flow There is peripheral ischemia There is a large core infarct     There is a large core infarct exceeding 50% in the region of the distribution of the middle cerebral artery with a relatively small region of peripheral ischemia     Cta Head W Contrast    Result Date: 10/23/2020  Patient MRN:  [id number] : 1955 Age: 60 years Gender: Male Order Date:  10/23/2020 12:16 PM EXAM: CTA NECK W CONTRAST, CTA HEAD W CONTRAST NUMBER OF IMAGES:  808 INDICATION:  CVA CVA COMPARISON: None Technique: Low-dose CT  acquisition technique included one of following options; 1 . Automated exposure control, 2. Adjustment of MA and or KV according to patient's size or 3. Use of iterative reconstruction. Contiguous spiral images were obtained in the axial plane, following the administration of intravenous contrast using CT angiographic protocol. Sagittal and coronal images were reconstructed from the axial plane acquisition. Additional MIP reconstructions were presented to aid in the interpretation of this study. Images were obtained from the skull base cranially. There is evidence for moderate ethmoid sinusitis There is severe calcified plaque identified in the vessels compatible with atherosclerotic disease. There is aortic arch and great vessels demonstrate moderate atherosclerotic disease. The right carotid is occluded The left carotid is abnormal. There is no evidence for hemodynamically significant stenosis at the level the proximal internal carotid artery. 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 The basilar artery is unremarkable. The middle cerebral arteries are abnormal the right middle cerebral artery is occluded . The right M1 and M2 segments appear occluded The anterior cerebral arteries are unremarkable.  The posterior cerebral arteries are unremarkable. 1.  Occluded right internal carotid artery from the posterior margin through the right M1 and likely into segment . Occluded right vertebral artery from the origin to the basilar artery. 2. Estimated stenosis of the proximal left internal carotid artery by NASCET criteria is not hemodynamically significant     Ir Mechanical Art Thrombectomy Intracranial    Result Date: 10/23/2020  IR MECHANICAL ART THROMBECTOMY INTRACRANIAL PROCEDURE PERFORMED: Cerebral angiogram with mechanical thrombectomy PERFORMED BY : Annette Thomas MD Assisted By [facility] DATE:  10/23/2020 1:00 PM CLINICAL HISTORY/PRE-PROCEDURE DIAGNOSIS: Acute ischemic stroke. CTA showing large vessel occlusion , CTP showing salvageable penumbra  CTP showing infarct or greater than 100 mL however patient reports clinical symptoms on waking up. Procedure was considered on a humanitarian basis due to significant deficit and NIH score being  21 with neurological deterioration POST-PROCEDURE DIAGNOSIS: Successful mechanical thrombectomy with TICI3 recanalization COMPARISON: CT angiographic documentation on file ANESTHESIA: Conscious sedation and local anesthesia VESSELS CATHETERIZED: 1. Right common carotid artery. 2. Right internal carotid artery. 3. Right middle cerebral artery. RADIATION EXPOSURE DATA:  378.1 mGy TOTAL FLUOROSCOPY TIME: 10 minutes 21 seconds CONTRAST USED: 60 mL of Isovue-300 PROCEDURE: Following informed consent with patient/family if available and explanation of risk and benefits of the procedure including but not limited to intracranial hemorrhage, Stroke , MI and death, the patient was brought to the angiography suite, family and was aware that this procedure was offered on a humanitarian basis. Both groins are prepped and draped in usual sterile manner. Vascular access was obtained in the right common femoral artery with a 9-Malay sheath which was connected to continuous heparinized saline flush.   A be third pass 1341 TICI 3 Postprocedure perfusion grade:TICI 3 Time of vascular closure:1343 Postprocedure NH stroke scale:not assessed due to sedation significant INTERPRETATION OF IMAGES: 1. Right common/internal carotid artery injections: There is complete occlusion of the distal right common carotid artery/right internal carotid artery origin. This was noticed on roadmap imaging and aspiration was performed. Postaspiration there is occlusion noticed distally in the intracranial ICA portion with no flow seen distally. Tandem occlusion in the MCA was also noticed post thrombectomy. 2.  Postprocedure right internal carotid artery injections: There was complete restoration of flow in the Right middle cerebral artery with no evidence of contrast extravasation or other vascular complications. And complete restoration of flow to the whole right internal carotid artery. IMPRESSION RIGHT middle cerebral artery occlusion and right internal carotid artery occlusion status post successful mechanical thrombectomy. with TICI 3 recanalization RECOMMENDATIONS Post operative CT head Please see consult note placed in chart. 24 hour CT head /MRI Brain Q1 Neurochecks Neurovascular checks Patients: If you have questions regarding some of the verbiage in your report, please visit [email or web address] for a definition. If you have any other questions, please contact your physician.            LABS:    CBC:   Lab Results   Component Value Date    WBC 15.9 10/25/2020    RBC 4.01 10/25/2020    HGB 12.6 10/25/2020    HCT 38.5 10/25/2020    MCV 96.0 10/25/2020    MCH 31.4 10/25/2020    MCHC 32.7 10/25/2020    RDW 12.9 10/25/2020     10/25/2020    MPV 10.0 10/25/2020     CMP:    Lab Results   Component Value Date     10/25/2020    K 3.5 10/25/2020     10/25/2020    CO2 22 10/25/2020    BUN 18 10/25/2020    CREATININE 0.6 10/25/2020    GFRAA >60 10/25/2020    LABGLOM >60 10/25/2020    GLUCOSE 141 10/25/2020    GLUCOSE 112 04/27/2012    PROT 6.9 10/23/2020    LABALBU 4.3 10/23/2020    LABALBU 4.8 04/27/2012    CALCIUM 9.1 10/25/2020    BILITOT 0.5 10/23/2020    ALKPHOS 79 10/23/2020    AST 26 10/23/2020    ALT 22 10/23/2020     HgBA1c:    Lab Results   Component Value Date    LABA1C 5.3 10/25/2020     FLP:    Lab Results   Component Value Date    TRIG 101 10/25/2020    HDL 48 10/25/2020    LDLCALC 91 10/25/2020    LABVLDL 20 10/25/2020        CRITICAL CARE BILLING  A total of 35 minutes of critical care time was spent addressing central nervous system failure. Neurocritical care elements considered include risk of cerebral edema and management , evaluation of risk of cerebral bleeding, Secondary prevention and prevention of complications  associated with ICU stay. This time was also spent reviewing the patient's case, discussing with other physicians, evaluating imaging, and direct patient care. Greater than 50 % of the time was spent on direct, face to face,  patient care and counseling with daughter who arrived from [address] [place] [address].

## 2020-10-25 NOTE — PROGRESS NOTES
Subjective:  Arousable answers questions appropriately denies complaints. Family at bedside all questions answered   Discussed with staff  Objective:    /72   Pulse 82   Temp 98.2 °F (36.8 °C)   Resp 16   Ht 6' 2\" (1.88 m)   Wt 220 lb (99.8 kg)   SpO2 93%   BMI 28.25 kg/m²     24HR INTAKE/OUTPUT:      Intake/Output Summary (Last 24 hours) at 10/25/2020 0919  Last data filed at 10/25/2020 0900  Gross per 24 hour   Intake 3006 ml   Output 4870 ml   Net -1864 ml       General appearance: NAD, answering questions following commands on right side flaccid on the left  Neck: FROM, supple   Lungs: Clear bilaterally no wheezes, no rhonchi, no crackles  CV: RRR, no MRGs; normal carotid upstroke and amplitude without Bruits  Abdomen: Soft, non-tender; no masses or HSM  Extremities: No edema, no cyanosis, no clubbing  Skin: Intact no rash, no lesions, no ulcers    Psych: Alert and oriented normal affect  Neuro: Left-sided hemiparesis minimal withdrawal left lower extremity to noxious stimuli  Most Recent Labs  Lab Results   Component Value Date    WBC 15.9 (H) 10/25/2020    HGB 12.6 10/25/2020    HCT 38.5 10/25/2020     10/25/2020     (H) 10/25/2020    K 3.5 10/25/2020     (H) 10/25/2020    CREATININE 0.6 (L) 10/25/2020    BUN 18 10/25/2020    CO2 22 10/25/2020    GLUCOSE 141 (H) 10/25/2020    ALT 22 10/23/2020    AST 26 10/23/2020    INR 1.0 10/23/2020    TSH 2.920 06/04/2020    LABA1C 5.3 10/25/2020    LABMICR <12.0 03/16/2015     Recent Labs     10/25/20  0509   MG 2.1     Lab Results   Component Value Date    CALCIUM 9.1 10/25/2020    PHOS 2.0 (L) 10/25/2020        CT HEAD WO CONTRAST   Final Result   1. Encephalomalacia is seen within the right cerebral hemisphere consistent   with a subacute large right MCA and BASSAM distribution infarct. 2. Cytotoxic edema seen throughout the right cerebral hemisphere with   approximately 5 mm right to left subfalcine herniation.    3. There is no acute intracranial hemorrhage. 4.      XR ABDOMEN (KUB) (SINGLE AP VIEW)   Final Result   Enteric tube in the stomach as above. No evidence of bowel obstruction. CT HEAD WO CONTRAST   Final Result   1. Diffuse pattern of cytotoxic edema in the right cerebral hemisphere in the   distribution of the anterior and middle cerebral arteries likely correlating   to recent stroke. There is sulcal effacement and mass effect upon the right   lateral ventricle. 2-3 mm subfalcine herniation from right to left. 2. No evidence of intracranial hemorrhage. Decreased density of presumed   contrast observed on prior CT. CT HEAD WO CONTRAST   Final Result   1. Hyperdensity in the right MCA distribution likely represents contrast   staining from recent interventional procedure. 2. Mild edema in the right cerebral hemisphere resulting in minimal right to   left shift of approximately 2 mm. IR MECHANICAL ART THROMBECTOMY INTRACRANIAL   Final Result      XR CHEST PORTABLE   Final Result   Diffuse prominent reticular markings may be related to interstitial edema. No airspace consolidation. CTA NECK W CONTRAST   Final Result   1. Occluded right internal carotid artery from the posterior margin   through the right M1 and likely into segment . Occluded right   vertebral artery from the origin to the basilar artery. 2. Estimated stenosis of the proximal left internal carotid artery by   NASCET criteria is not hemodynamically significant               CT BRAIN PERFUSION   Final Result      There is a large core infarct exceeding 50% in the region of the   distribution of the middle cerebral artery with a relatively small   region of peripheral ischemia            CTA HEAD W CONTRAST   Final Result   1. Occluded right internal carotid artery from the posterior margin   through the right M1 and likely into segment . Occluded right   vertebral artery from the origin to the basilar artery.    2. Estimated stenosis of the proximal left internal carotid artery by   NASCET criteria is not hemodynamically significant               CT HEAD WO CONTRAST   Final Result   Findings suspicious significant right MCA infarct. Hyperdensity in the right   MCA suggest thrombus. Significant amount of mucosal thickening in the sinuses particularly ethmoid   air cells. Critical finding: Results were called to Dr. Adriana Jones  on 10/23/2020 at 1250   hours           Repeat CT head reviewed cerebral edema edema with subfalcine herniation  Assessment    Principal Problem:    Stroke, acute, thrombotic ([address]  Active Problems:    Acquired hypothyroidism    Diabetes mellitus type 2 in nonobese ([address]    HLD (hyperlipidemia)    Cerebral edema ([address]  Resolved Problems:    * No resolved hospital problems.  *      Plan:  26-year-old male history of diabetes hypertension hyperlipidemia, hypothyroidism admitted with acute right MCA stroke status post mechanical thrombectomy admitted to ICU     3% saline for cerebral edema  Monitor labs closely  ASA  Nicardipine drip-  Continue to monitor closely in ICU  High intensity statin  Check fasting lipid panel-LDL 91  Check hemoglobin A1c 5.3  Neurology consult appreciated  Critical care consult appreciated  Neurosurgery consult appreciated  Palliative care consult appreciated  Medications for other co morbidities cont as appropriate w dosage adjustments as necessary   PT/OT  DVT PPx  DC planning pending stabilization of medical conditions    Electronically signed by Mikala Mensah MD on 10/25/2020 at 9:19 AM

## 2020-10-25 NOTE — PROGRESS NOTES
CVA  Right MCA with edema. Repeat CT scan from today reviewed. Increase in midline shift  Patient obtunded but responds to commands. Opens eyes. Right hand  fair. Patient on 3% saline per Dr Jaskaran Beavers.   Spoke with family & explained the need for possible craniectomy if neuro status worsens

## 2020-10-25 NOTE — PLAN OF CARE
Problem: Skin Integrity:  Goal: Will show no infection signs and symptoms  Description: Will show no infection signs and symptoms  10/25/2020 0720 by Felice Clark RN  Outcome: Met This Shift  10/25/2020 0025 by Katie Martin RN  Outcome: Met This Shift  Goal: Absence of new skin breakdown  Description: Absence of new skin breakdown  10/25/2020 0720 by Felice Clark RN  Outcome: Met This Shift  10/25/2020 0025 by Katie Martin RN  Outcome: Met This Shift     Problem: Falls - Risk of:  Goal: Will remain free from falls  Description: Will remain free from falls  10/25/2020 0720 by Felice Clark RN  Outcome: Met This Shift  10/25/2020 0025 by Katie Martin RN  Outcome: Met This Shift  Goal: Absence of physical injury  Description: Absence of physical injury  10/25/2020 0720 by Felice Clark RN  Outcome: Met This Shift  10/25/2020 0025 by Katie Martin RN  Outcome: Met This Shift

## 2020-10-25 NOTE — PROGRESS NOTES
[facility]  PROGRESS NOTE  ATTENDING NOTE    Care deferred to Interventional Neurology, Vascular and Neuro critical Care  Patient more lethargic today and speech a little more slurred  Tolerating tube feeds  Dr. Brayden Ibarra notified of CT head  Daughter updated at bedside  Monitor Na, SBP    Wesly Villalobos MD, MSc, FACS  10/25/2020  11:07 AM

## 2020-10-25 NOTE — PLAN OF CARE
Problem: Skin Integrity:  Goal: Will show no infection signs and symptoms  Description: Will show no infection signs and symptoms  Outcome: Met This Shift     Problem: Skin Integrity:  Goal: Absence of new skin breakdown  Description: Absence of new skin breakdown  Outcome: Met This Shift     Problem: Falls - Risk of:  Goal: Will remain free from falls  Description: Will remain free from falls  Outcome: Met This Shift     Problem: Falls - Risk of:  Goal: Absence of physical injury  Description: Absence of physical injury  Outcome: Met This Shift

## 2020-10-26 NOTE — PLAN OF CARE
Problem: Skin Integrity:  Goal: Absence of new skin breakdown  Description: Absence of new skin breakdown  10/26/2020 0816 by Kellee Archer RN  Outcome: Met This Shift     Problem: Falls - Risk of:  Goal: Will remain free from falls  Description: Will remain free from falls  10/26/2020 0816 by Kellee Arhcer RN  Outcome: Met This Shift (0) independent

## 2020-10-26 NOTE — PROGRESS NOTES
SPEECH/LANGUAGE PATHOLOGY  SPEECH/LANGUAGE/COGNITIVE EVALUATION      PATIENT NAME:  Chandrakant Sanchez      :  1955          TODAY'S DATE:  10/26/2020 ROOM:  3815/3815-A       ADMITTING DIAGNOSIS: Stroke, acute, thrombotic ([facility] [facility] [I63.9]  Stroke, acute, thrombotic ([facility] [I63.9]  Stroke, acute, thrombotic ([facility] [address] [I63.9]    SPEECH PATHOLOGY DIAGNOSIS:    Mild-moderate cognitive deficit, moderate dysarthria     THERAPY RECOMMENDATIONS:        Speech Pathology intervention is recommended 3-6 times per week for LOS or when goals are met with emphasis on the following: To Improve attention to structured tasks and environment with minimal  cuing  To Improve Orientation to spatial and temporal surroundings with use of external memory aides. To Improve comprehension of questions, directions and conversation during structured and non-structured activities with minimal  cuing  To Improve response time to material presented in both auditory/written format (1-3 level commands, conversation level) on initial presentation with decreased latency. To Improve oral motor strength and ROM  To Improve speech intelligibility through use of compensatory strategies with minimal  cuing                                                                                                                                        MOTOR SPEECH                  Oral Peripheral Examination   Left labiobuccal weakness and Left lingual deviation      Parameters of Speech Production  Respiration:     Adequate for speech production  Articulation:     Distortion  Resonance:     Within functional limits  Quality:            Strained  Pitch: Within functional limits  Intensity:          Within functional limits  Fluency:          Intact  Prosody           Intact     RECEPTIVE LANGUAGE     Comprehension of Yes/No Questions:    Within functional limits     Process  Simple Verbal Commands:   Latent  Process Intermediate Verbal Commands: Latent  Process Complex Verbal Commands:                           Incomplete and Perseveration     Comprehension of Conversation:                           Latent        EXPRESSIVE LANGUAGE      Serials: Functional     Imitation:  Words              Functional         Sentences       Functional     Naming:           (Modality used:  Verbal)  Confrontation Naming  Functional  Functional Description  Functional  Response Naming: Functional     Conversation:                 Confusion was noted during conversation     COGNITION      Attention/Orientation  Attention: Easily Distracted  Orientation:  Oriented to Person, Place, Date, Reason for hospitalization     Memory   Immediate Recall:       Repeated 3/3     Delayed Recall:           Recalled 3/3     Long Term Recall:      Recalled Address, Birthdate, Age and Family        CLINICAL OBSERVATIONS NOTED DURING THE EVALUATION  Latent responses and Cueing was required                                                                                                                                                       Prognosis for improvements is good  This plan will be re-evaluated and revised in 1 week  if warranted. Patient stated goals: Agreed with above  Treatment goals discussed with Patient and Family   The Patient and Family understand(s) the diagnosis, prognosis and plan of care         CPT code:    42135  eval speech sound lang comprehension           The admitting diagnosis and active problem list, as listed below have been reviewed prior to initiation of this evaluation.       ACTIVE PROBLEM LIST:   Patient Active Problem List   Diagnosis    [facility]ocytic [facility]    Acquired hypothyroidism    Chronic idiopathic gout of left foot    Recurrent genital herpes    Diabetes mellitus type 2 in nonobese ([address]    Prediabetes    Stroke, acute, thrombotic ([address]    HLD (hyperlipidemia)    Cerebral edema ([address]

## 2020-10-26 NOTE — PROGRESS NOTES
Subjective:    Awake  Does follow commands  Wife is by the bedside    Objective:    BP (!) 154/81   Pulse 110   Temp 98.2 °F (36.8 °C) (Oral)   Resp 18   Ht 6' 2\" (1.88 m)   Wt 207 lb 14.3 oz (94.3 kg)   SpO2 95%   BMI 26.69 kg/m²     24HR INTAKE/OUTPUT:      Intake/Output Summary (Last 24 hours) at 10/26/2020 1620  Last data filed at 10/26/2020 1600  Gross per 24 hour   Intake 3700 ml   Output 5960 ml   Net -2260 ml       General appearance: NAD, answering questions following commands on right side flaccid on the left  Neck: FROM, supple   Lungs: Clear bilaterally no wheezes, no rhonchi, no crackles  CV: RRR, no MRGs; normal carotid upstroke and amplitude without Bruits  Abdomen: Soft, non-tender; no masses or HSM  Extremities: No edema, no cyanosis, no clubbing  Skin: Intact no rash, no lesions, no ulcers    Psych: Alert and oriented normal affect  Neuro: Left-sided hemiparesis minimal withdrawal left lower extremity to noxious stimuli  Most Recent Labs  Lab Results   Component Value Date    WBC 14.2 (H) 10/26/2020    HGB 12.9 10/26/2020    HCT 39.6 10/26/2020     10/26/2020     (H) 10/26/2020    K 3.1 (L) 10/26/2020     (H) 10/26/2020    CREATININE 0.6 (L) 10/26/2020    BUN 20 10/26/2020    CO2 24 10/26/2020    GLUCOSE 138 (H) 10/26/2020    ALT 22 10/23/2020    AST 26 10/23/2020    INR 1.0 10/23/2020    TSH 2.920 06/04/2020    LABA1C 5.3 10/25/2020    LABMICR <12.0 03/16/2015     Recent Labs     10/26/20  0508   MG 2.2     Lab Results   Component Value Date    CALCIUM 8.9 10/26/2020    PHOS 2.9 10/26/2020        CT HEAD WO CONTRAST   Final Result   1. Encephalomalacia is seen within the right cerebral hemisphere consistent   with a subacute large right MCA and BASSAM distribution infarct. 2. Cytotoxic edema seen throughout the right cerebral hemisphere with   approximately 5 mm right to left subfalcine herniation. 3. There is no acute intracranial hemorrhage.    4.      XR ABDOMEN (KUB) (SINGLE AP VIEW)   Final Result   Enteric tube in the stomach as above. No evidence of bowel obstruction. CT HEAD WO CONTRAST   Final Result   1. Diffuse pattern of cytotoxic edema in the right cerebral hemisphere in the   distribution of the anterior and middle cerebral arteries likely correlating   to recent stroke. There is sulcal effacement and mass effect upon the right   lateral ventricle. 2-3 mm subfalcine herniation from right to left. 2. No evidence of intracranial hemorrhage. Decreased density of presumed   contrast observed on prior CT. CT HEAD WO CONTRAST   Final Result   1. Hyperdensity in the right MCA distribution likely represents contrast   staining from recent interventional procedure. 2. Mild edema in the right cerebral hemisphere resulting in minimal right to   left shift of approximately 2 mm. IR MECHANICAL ART THROMBECTOMY INTRACRANIAL   Final Result      XR CHEST PORTABLE   Final Result   Diffuse prominent reticular markings may be related to interstitial edema. No airspace consolidation. CTA NECK W CONTRAST   Final Result   1. Occluded right internal carotid artery from the posterior margin   through the right M1 and likely into segment . Occluded right   vertebral artery from the origin to the basilar artery. 2. Estimated stenosis of the proximal left internal carotid artery by   NASCET criteria is not hemodynamically significant               CT BRAIN PERFUSION   Final Result      There is a large core infarct exceeding 50% in the region of the   distribution of the middle cerebral artery with a relatively small   region of peripheral ischemia            CTA HEAD W CONTRAST   Final Result   1. Occluded right internal carotid artery from the posterior margin   through the right M1 and likely into segment . Occluded right   vertebral artery from the origin to the basilar artery.    2. Estimated stenosis of the proximal left internal carotid artery by   NASCET criteria is not hemodynamically significant               CT HEAD WO CONTRAST   Final Result   Findings suspicious significant right MCA infarct. Hyperdensity in the right   MCA suggest thrombus. Significant amount of mucosal thickening in the sinuses particularly ethmoid   air cells. Critical finding: Results were called to Dr. Radha Campbell  on 10/23/2020 at 1250   hours         MRI BRAIN WO CONTRAST    (Results Pending)     Repeat CT head reviewed cerebral edema edema with subfalcine herniation  Assessment    Principal Problem:    Stroke, acute, thrombotic ([address]  Active Problems:    Acquired hypothyroidism    Diabetes mellitus type 2 in nonobese ([address]    HLD (hyperlipidemia)    Cerebral edema ([address]  Resolved Problems:    * No resolved hospital problems.  *      Plan:  59-year-old male history of diabetes hypertension hyperlipidemia, hypothyroidism admitted with acute right MCA stroke status post mechanical thrombectomy admitted to ICU     3% saline for cerebral edema  Monitor labs closely  ASA  Nicardipine drip-  Continue to monitor closely in ICU  High intensity statin  Check fasting lipid panel-LDL 91  Check hemoglobin A1c 5.3  Neurology consult appreciated  Critical care consult appreciated  Neurosurgery consult appreciated  Palliative care consult appreciated  3% saline is being weaned  Continue supportive care    Discussed with nursing  Electronically signed by Chad Mancilla MD on 10/26/2020 at 4:20 PM

## 2020-10-26 NOTE — CONSULTS
John Molina is a 60 y.o. male past medical history of hypothyroidism, GERD, leukemia, was brought in on 10/23/2020 after he had a left-sided weakness with left facial paralysis and left hemineglect. Noticed by wife. He has been complaining of left hand weakness about a week before presentation. He was out of the TPA window. He did get mechanical thrombectomy. Neurology was consulted for stroke evaluation. Chief Complaint   Patient presents with    Cerebrovascular Accident     Wife last saw pt at 10pm, Pt's mom found pt this am with L side weakness       Prior to Visit Medications    Medication Sig Taking?  Authorizing Provider   colchicine (COLCRYS) 0.6 MG tablet Take 0.6 mg by mouth daily Yes Historical Provider, MD   Febuxostat 80 MG TABS Take 80 mg by mouth daily Yes Historical Provider, MD   levothyroxine (SYNTHROID) 50 MCG tablet Take 50 mcg by mouth Daily Yes Historical Provider, MD   valACYclovir (VALTREX) 1 g tablet Take 1,000 mg by mouth daily Yes Historical Provider, MD   ibuprofen (ADVIL;MOTRIN) 800 MG tablet Take 1 tablet by mouth every 6 hours as needed for Pain Yes Fredy Haque MD     Social History     Tobacco Use    Smoking status: Never Smoker    Smokeless tobacco: Never Used   Substance Use Topics    Alcohol use: No     Alcohol/week: 0.0 standard drinks     Comment: social    Drug use: No     Family History   Problem Relation Age of Onset    Coronary Art Dis Father         aged 61    Breast Cancer Mother     Mult Sclerosis Sister      Past Surgical History:   Procedure Laterality Date    BACK SURGERY      for spinal stenosis [facility]    IR MECHANICAL ART THROMBECTOMY INTRACRANIAL  10/23/2020    IR MECHANICAL ART THROMBECTOMY INTRACRANIAL 10/23/2020 [facility]    TUMOR EXCISION      melanoma on the back     Past Medical History:   Diagnosis Date    Allergic rhinitis     had allergy shots    Cancer ([facility] [address]     leukemia    Gout     Thyroid disease Review of Systems   HENT: Negative. Respiratory: Negative. Cardiovascular: Negative. Gastrointestinal: Negative. Musculoskeletal: Negative. Objective:   BP (!) 141/75   Pulse 92   Temp 98.2 °F (36.8 °C) (Oral)   Resp 17   Ht 6' 2\" (1.88 m)   Wt 207 lb 14.3 oz (94.3 kg)   SpO2 94%   BMI 26.69 kg/m²     Physical Exam  HENT:      Head: Normocephalic. Eyes:      Extraocular Movements: No nystagmus. Conjunctiva/sclera: Conjunctivae normal.   Cardiovascular:      Rate and Rhythm: Normal rate and regular rhythm. Pulmonary:      Effort: Pulmonary effort is normal.      Breath sounds: Normal breath sounds. Abdominal:      General: Bowel sounds are normal.   Skin:     General: Skin is warm and dry. Neurological:      Cranial Nerves: Dysarthria present. Psychiatric:         Mood and Affect: Mood normal.         Behavior: Behavior normal.                 Neurological Exam  Mental Status  Awake, alert and oriented to person, place and time. Moderate dysarthria present. Cranial Nerves  CN II: Right visual acuity: counts fingers. Right normal visual field. CN III, IV, VI: No nystagmus. CN V:  Right: Facial sensation is normal.  Left: Diminished sensation of the entire left side of the face. CN VII:  Right: There is no facial weakness. Taste is normal.  Left: There is central facial weakness. CN XI:  Right: Sternocleidomastoid strength is normal.  Left: Sternocleidomastoid strength is weak. Motor  Normal muscle bulk throughout. Normal muscle tone. Strength is 5/5 in all four extremities except as noted. Complete Left sided paralysis with no movement at all. .    Sensory  Light touch abnormality:   Sensory loss noted on left side.       Laboratory/Radiology:     CBC with Differential:    Lab Results   Component Value Date    WBC 14.2 10/26/2020    RBC 4.06 10/26/2020    HGB 12.9 10/26/2020    HCT 39.6 10/26/2020     10/26/2020    MCV 97.5 10/26/2020    MCH 31.8 10/26/2020    MCHC 32.6 10/26/2020    RDW 13.0 10/26/2020    SEGSPCT 21 04/27/2012    LYMPHOPCT 15.8 10/23/2020    MONOPCT 6.2 10/23/2020    BASOPCT 0.9 10/23/2020    MONOSABS 0.62 10/23/2020    LYMPHSABS 1.59 10/23/2020    EOSABS 0.33 10/23/2020    BASOSABS 0.09 10/23/2020     BMP:    Lab Results   Component Value Date     10/26/2020    K 3.1 10/26/2020     10/26/2020    CO2 24 10/26/2020    BUN 20 10/26/2020    LABALBU 4.3 10/23/2020    LABALBU 4.8 04/27/2012    CREATININE 0.6 10/26/2020    CALCIUM 8.9 10/26/2020    GFRAA >60 10/26/2020    LABGLOM >60 10/26/2020    GLUCOSE 138 10/26/2020    GLUCOSE 112 04/27/2012       CT head:  10/23/2020: Findings suspicious for significant right MCA infarct, hypodensity in the right MCA suggest thrombus. 10/23/2020: post stroke intervention:  Hypodensity in the right MCA distribution likely represents contrast staining from recent interventional procedure, mild edema in the right cerebral hemisphere resulting in minimal right-to-left shift of approximately 2 mm    10/24/2020:   Diffuse pattern of cytotoxic edema in the right cerebral hemisphere distribution of BASSAM, MCA, there is sulcal effacement and mass-effect upon the right lateral ventricle, 2 to 3 mm subfalcine herniation from right to left    10/25/2020  Encephalomalacia is seen in the right cerebral hemisphere consistent with subacute large right MCA and BASSAM distribution infarct, cytotoxic edema seen in right right cerebral hemisphere approximately 5 mm right to left subfalcine herniation,, no ICH    CT brain perfusion  Large core infarct exceeding 50% in the regional distribution of MCA with a relatively small region of peripheral ischemia    CTAs head/neck: No hemodynamically significant stenosis, occluded right ICA from posterior margin throughout the right M1 and likely into segment, occluded right vertebral artery from the origin to the basilar artery.     Echo w bubble:  Pending    I independently reviewed the labs and imaging studies at today's appointment.      Assessment:     Right MCA stroke s/p mechanical thrombectomy  Left-sided weakness, facial palsy with hemineglect s/p stroke  Cerebral edema  Hypertension  Hyperlipidemia  Hypothyroidism        Plan:     MRI brain without contrast  Serial CT scans recommended  Continue hypertonic saline   Monitor neuro status  PT/OT  Continue aspirin, statin  Change lovenox to heparin,as heparin is easier to reverse in case of bleeding or hemorrhage      Sergio Roach  10:36 AM  10/26/2020

## 2020-10-26 NOTE — PROGRESS NOTES
Discussed hypertonic saline orders with .  Sodium goal achieved at 0400, Na 156 and 3% decreased to 100 mL/hr at 0400. Plan:  Decrease infusion by 25 mL/hr every 6 hours. See orders.

## 2020-10-26 NOTE — PLAN OF CARE
Problem: Skin Integrity:  Goal: Will show no infection signs and symptoms  Description: Will show no infection signs and symptoms  Outcome: Met This Shift     Problem: Skin Integrity:  Goal: Absence of new skin breakdown  Description: Absence of new skin breakdown  10/26/2020 1949 by Radha Webber RN  Outcome: Met This Shift     Problem: Falls - Risk of:  Goal: Will remain free from falls  Description: Will remain free from falls  10/26/2020 1949 by Radha Webber RN  Outcome: Met This Shift     Problem: Falls - Risk of:  Goal: Absence of physical injury  Description: Absence of physical injury  Outcome: Met This Shift     Problem: COMMUNICATION IMPAIRMENT  Goal: Ability to express needs and understand communication  Outcome: Met This Shift     Problem: HEMODYNAMIC STATUS  Goal: Patient has stable vital signs and fluid balance  Outcome: Ongoing     Problem: ACTIVITY INTOLERANCE/IMPAIRED MOBILITY  Goal: Mobility/activity is maintained at optimum level for patient  Outcome: Not Met This Shift

## 2020-10-26 NOTE — PROGRESS NOTES
SPEECH/LANGUAGE PATHOLOGY  BEDSIDE SWALLOWING EVALUATION    PATIENT NAME:  Padilla Irene      :  1955          TODAY'S DATE:  10/26/2020 ROOM:  3815/3815-A      SUMMARY OF EVALUATION                 DYSPHAGIA DIAGNOSIS:  Mild oral dysphagia                            DIET RECOMMENDATIONS:  Dental soft (Easy to Chew) solids with  thin liquids                 FEEDING RECOMMENDATIONS:                           Assistance level:  Set-up is required for all oral intake                             Compensatory strategies recommended: Check for oral pocketing     THERAPY RECOMMENDATIONS:                  Dysphagia therapy is recommended 3-5 times per week for LOS or when goals are met with emphasis on the following:  Pt will complete oral motor strength/ coordination exercises to improve bolus prep/ control and mastication with minimal  cuing.                                                                                                                                                          PROCEDURE      Consistencies Administered During the Evaluation   Liquids:            thin liquid           Solids:             pureed foods and solid foods       Method of Intake:   cup, straw, spoon  Fed by clinician       Position:   Seated, upright                                                                                                                                                       RESULTS      Oral Stage:                       Inadequate labial seal resulting anterior labial spillage on the right (due to positioning) and Oral residuals were noted :  left buccal cavity          Pharyngeal Stage:         No signs of aspiration were noted during this evaluation however, silent aspiration cannot be ruled out at bedside. If silent aspiration is suspected, a Videofluoroscopic Study of Swallowing (MBS) is recommended and requires a physician order.

## 2020-10-26 NOTE — PROGRESS NOTES
I have seen and examined the patient with the Resident Physician on 10/26/2020. The clinical findings and decision making for this patient were discussed in detatl. I agree with the current plan of care including the workup, evaluation, management, and diagnosis. I have reviewed the documentation included and I agree with evaluation and plan of care as detailed. Patient s/p right MCA/BASSAM stroke s/p thrombectomy. Patient with right gaze preference with left neglect along with left hemiplegia and hemisensory loss. CT scan of head done 10/25/202 showing cerebral edema in right MCA/BASSAM territory with obliteration of lateral ventricle with 5 mm right to left shift. Patient on Hypertonic Saline and now entering into peak edema period (days3-7). MRI or CT scan this evening with gradual reduction in hypertonic saline. Will need to [facility] rCNS status closely given risk for herniation. Further pending MRI/CT results. Spoke to patient's wife with regard to plan of care. Expressed concern about his cerebral edema and fact that he is entering peak edema period. He is at risk for herniation. He is not a candidate for antiplatelet therapy or anticoagulation at this time. He is high risk for stroke extension and bleed complications from his large hemispheric stroke. He is on ASA 81 mg daily and will switch from Lovenox to heparin for help with DVT prophylaxis. Prognosis is poor at this time and will need to start consideration of DNR status given degree of stroke involvement and likely severe impairment as a result of his stroke.      Niles Miramontes MD

## 2020-10-27 NOTE — CARE COORDINATION
Met with wife and pt at bedside. Pt able to answer some questions, but dozes off. Speech is slurred. Per wife, they live in a 1 story home with a basement. 3 steps to enter. Pt was very active and independent in adl's pta. Explained plan to transition to rehab when medically stable. Provided wife with acute rehab and joao lists to review. Will need PT/OT ordered when medically appropriate.

## 2020-10-27 NOTE — PROGRESS NOTES
Subjective:    A lot more conversive today  Wife is by the bedside    Objective:    /81   Pulse 100   Temp 98.6 °F (37 °C) (Axillary)   Resp 17   Ht 6' 2\" (1.88 m)   Wt 203 lb 0.7 oz (92.1 kg)   SpO2 96%   BMI 26.07 kg/m²     24HR INTAKE/OUTPUT:      Intake/Output Summary (Last 24 hours) at 10/27/2020 1649  Last data filed at 10/27/2020 1600  Gross per 24 hour   Intake 860 ml   Output 2455 ml   Net -1595 ml       General appearance: NAD, answering questions following commands on right side flaccid on the left  Neck: FROM, supple   Lungs: Clear bilaterally no wheezes, no rhonchi, no crackles  CV: RRR, no MRGs; normal carotid upstroke and amplitude without Bruits  Abdomen: Soft, non-tender; no masses or HSM  Extremities: No edema, no cyanosis, no clubbing  Skin: Intact no rash, no lesions, no ulcers    Psych: Alert and oriented normal affect  Neuro: Left-sided hemiparesis minimal withdrawal left lower extremity to noxious stimuli  Most Recent Labs  Lab Results   Component Value Date    WBC 15.7 (H) 10/27/2020    HGB 14.3 10/27/2020    HCT 44.9 10/27/2020     10/27/2020     (H) 10/27/2020    K 3.1 (L) 10/27/2020     (H) 10/27/2020    CREATININE 0.7 10/27/2020    BUN 32 (H) 10/27/2020    CO2 25 10/27/2020    GLUCOSE 119 (H) 10/27/2020    ALT 22 10/23/2020    AST 26 10/23/2020    INR 1.0 10/23/2020    TSH 2.920 06/04/2020    LABA1C 5.3 10/25/2020    LABMICR <12.0 03/16/2015     Recent Labs     10/27/20  0400   MG 2.6     Lab Results   Component Value Date    CALCIUM 9.4 10/27/2020    PHOS 3.9 10/27/2020        MRI BRAIN WO CONTRAST   Final Result   Acute infarct right middle cerebral artery territory with edema, midline   shift and petechial hemorrhage. This demonstrates some interval progression   allowing for difference in technique. CT HEAD WO CONTRAST   Final Result   1.  Encephalomalacia is seen within the right cerebral hemisphere consistent   with a subacute large right MCA internal carotid artery from the posterior margin   through the right M1 and likely into segment . Occluded right   vertebral artery from the origin to the basilar artery. 2. Estimated stenosis of the proximal left internal carotid artery by   NASCET criteria is not hemodynamically significant               CT HEAD WO CONTRAST   Final Result   Findings suspicious significant right MCA infarct. Hyperdensity in the right   MCA suggest thrombus. Significant amount of mucosal thickening in the sinuses particularly ethmoid   air cells. Critical finding: Results were called to Dr. Radha Campbell  on 10/23/2020 at 1250   hours           Repeat CT head reviewed cerebral edema edema with subfalcine herniation  Assessment    Principal Problem:    Stroke, acute, thrombotic ([address]  Active Problems:    Acquired hypothyroidism    Diabetes mellitus type 2 in nonobese ([address]    HLD (hyperlipidemia)    Cerebral edema ([address]  Resolved Problems:    * No resolved hospital problems.  *      Plan:  29-year-old male history of diabetes hypertension hyperlipidemia, hypothyroidism admitted with acute right MCA stroke status post mechanical thrombectomy admitted to ICU     3% saline for cerebral edema  Monitor labs closely  ASA  Nicardipine drip-  Continue to monitor closely in ICU  High intensity statin  Check fasting lipid panel-LDL 91  Check hemoglobin A1c 5.3  Neurology consult appreciated    Speech recommendations noted  Likely would be good candidate for acute rehab once stable    Discussed with nursing  Electronically signed by Chad Mancilla MD on 10/27/2020 at 4:49 PM

## 2020-10-27 NOTE — PROGRESS NOTES
understand(s) diagnosis, prognosis, and plan of care. Yes     PLAN:    Current Treatment Recommendations     [x] Strengthening     [x] ROM   [x] Balance Training   [x] Endurance Training   [x] Transfer Training   [x] Gait Training   [] Stair Training   [x] Positioning   [x] Safety and Education Training   [x] Patient/Caregiver Education   [] HEP  [] Other     Frequency of treatments: 2-5x/week x 1-2 weeks. Time in  1355  Time out  1425    Total Treatment Time  23 minutes     Evaluation Time includes thorough review of current medical information, gathering information on past medical history/social history and prior level of function, completion of standardized testing/informal observation of tasks, assessment of data and education on plan of care and goals.     CPT codes:  [] Low Complexity PT evaluation 60549  [x] Moderate Complexity PT evaluation 49084  [] High Complexity PT evaluation 17174  [] PT Re-evaluation 71787  [] Gait training 23270 -- minutes  [] Manual therapy 18648 -- minutes  [x] Therapeutic activities 24983 23 minutes  [] Therapeutic exercises 38342 - minutes  [] Neuromuscular reeducation 85693 -- minutes     Mariana Hackett, PT, DPT  [id number]

## 2020-10-27 NOTE — PROGRESS NOTES
Henna Esteves is a 60 y.o. right handed male     Neurology following for stroke    PMH of hypothyroidism, GERD and leukemia    Presented to ED on 10/23/2020 with left sided weakness, left facial paralysis and left hemineglect. He was complaining of left hand weakness the week before, therefore out of tPA window. He was a thrombectomy candidate with MARIA TERESA occlusion. CTH hypodensity in RMCA suggestive of thrombus. CTH post mechanical thrombectomy hypodensity in RMCA with mild edema and right to left 2 mm shift. CTA head/neck occluded MARIA TERESA posterior margin throughout right M1, occluded right vertebral artery. CTP brain large core infarct > 50% in RMCA territory. MRI brain shows RMCA with edema, 8 mm midline shift and petechial hemorrhage. Wife at bedside and patient working with SLP. Patient alert and oriented and following commands on the right side, right gaze and left hemineglect. Passed bedside swallow. Spoke with wife regarding MRI brain with shift and we will need to wait and see how pt does as these are critical days of recovery. Addressed questions and concerns.       No chest pain or palpitations  No coughing or wheezing    No itching or bruising appreciated  + left side flaccid     ROS otherwise negative      Objective:     BP (!) 144/82   Pulse 103   Temp 98.4 °F (36.9 °C) (Axillary)   Resp 20   Ht 6' 2\" (1.88 m)   Wt 203 lb 0.7 oz (92.1 kg)   SpO2 96%   BMI 26.07 kg/m²     General appearance: alert, appears stated age, cooperative and no distress  Head: normocephalic, without obvious abnormality, atraumatic  Eyes: conjunctivae/corneas clear  Neck: symmetrical, trachea midline   Lungs: respirations unlabored   Heart: sinus tach on monitor  Abdomen: soft, non-tender  Extremities:  normal, atraumatic, no cyanosis, left hand 1+ edema no pitting   Pulses: 2+ and symmetric  Skin: color, texture, turgor normal---no rashes or lesions      Mental Status: Alert and oriented to self, time and place.  Follows commands on the right. Appropriate attention/concentration  Intact fundus of knowledge    Speech: moderate dysarthria   Language: no aphasias noted     Cranial Nerves:  I: smell NA   II: visual acuity  NA   II: visual fields Possible left HH?    II: pupils JEAN-PAUL   III,VII: ptosis None   III,IV,VI: extraocular muscles  Right gaze, right eye exotropic and cannot cross midline    V: mastication Normal   V: facial light touch sensation  Decreased on left   V,VII: corneal reflex     VII: facial muscle function - upper  Normal   VII: facial muscle function - lower Left droop   VIII: hearing Normal   IX: soft palate elevation  Normal   IX,X: gag reflex Present   XI: trapezius strength  5/5 right, 0/5 left    XI: sternocleidomastoid strength 5/5 right, 0/5 left   XI: neck extension strength  5/5 right, 0/5 left    XII: tongue strength  Deviated left      Motor:  5/5 strength on right side and flaccid on left   Normal bulk and tone  + drift right leg   No abnormal movements    Sensory:  LT normal on right   No sensation noted left     Reflexes:   RUE 2+, LUE BE  BLE 2+    Coordination:   FN attempted on right   HS intact right     ? left Babinskis    Laboratory/Radiology:     CBC with Differential:    Lab Results   Component Value Date    WBC 15.7 10/27/2020    RBC 4.60 10/27/2020    HGB 14.3 10/27/2020    HCT 44.9 10/27/2020     10/27/2020    MCV 97.6 10/27/2020    MCH 31.1 10/27/2020    MCHC 31.8 10/27/2020    RDW 13.2 10/27/2020    SEGSPCT 21 04/27/2012    LYMPHOPCT 15.8 10/23/2020    MONOPCT 6.2 10/23/2020    BASOPCT 0.9 10/23/2020    MONOSABS 0.62 10/23/2020    LYMPHSABS 1.59 10/23/2020    EOSABS 0.33 10/23/2020    BASOSABS 0.09 10/23/2020     CMP:    Lab Results   Component Value Date     10/27/2020    K 2.9 10/27/2020     10/27/2020    CO2 25 10/27/2020    BUN 32 10/27/2020    CREATININE 0.7 10/27/2020    GFRAA >60 10/27/2020    LABGLOM >60 10/27/2020    GLUCOSE 119 10/27/2020 GLUCOSE 112 04/27/2012    PROT 6.9 10/23/2020    LABALBU 4.3 10/23/2020    LABALBU 4.8 04/27/2012    CALCIUM 9.4 10/27/2020    BILITOT 0.5 10/23/2020    ALKPHOS 79 10/23/2020    AST 26 10/23/2020    ALT 22 10/23/2020     Hepatic Function Panel:    Lab Results   Component Value Date    ALKPHOS 79 10/23/2020    ALT 22 10/23/2020    AST 26 10/23/2020    PROT 6.9 10/23/2020    BILITOT 0.5 10/23/2020    LABALBU 4.3 10/23/2020    LABALBU 4.8 04/27/2012     HgBA1c:    Lab Results   Component Value Date    LABA1C 5.3 10/25/2020     FLP:    Lab Results   Component Value Date    TRIG 101 10/25/2020    HDL 48 10/25/2020    LDLCALC 91 10/25/2020    LABVLDL 20 10/25/2020     CTH: Findings suspicious significant right MCA infarct.  Hyperdensity in the right   MCA suggest thrombus. Significant amount of mucosal thickening in the sinuses particularly ethmoid air cells. IR mechanical art thrombectomy: RIGHT middle cerebral artery occlusion and right internal carotid artery occlusion status post successful mechanical thrombectomy. with   TICI 3 recanalization     Repeat CTH 10/25/20: 1. Encephalomalacia is seen within the right cerebral hemisphere consistent with a subacute large right MCA and BASSAM distribution infarct. 2. Cytotoxic edema seen throughout the right cerebral hemisphere with   approximately 5 mm right to left subfalcine herniation. 3. There is no acute intracranial hemorrhage. CTA head/neck: 1.  Occluded right internal carotid artery from the posterior margin   through the right M1 and likely into segment . Occluded right   vertebral artery from the origin to the basilar artery.    2. Estimated stenosis of the proximal left internal carotid artery by   NASCET criteria is not hemodynamically significant     CTP brain: There is a large core infarct exceeding 50% in the region of the   distribution of the middle cerebral artery with a relatively small   region of peripheral ischemia     MRI brain: Acute infarct right middle cerebral artery territory with edema, midline   shift and petechial hemorrhage.  This demonstrates some interval progression   allowing for difference in technique.      All labs and imaging studies reviewed independently    Assessment:     Right MCA/BASSAM stroke due to MARIA TERESA occlusion, atheroembolic, artery to artery s/p thrombectomy  --- TICI 3 recanalization   --- MRI brain shows right MCA territory infarct with cerebral edema, 8 mm midline shift and petechial hemorrhage---evolving stroke  --- LDL 91 and A1c 5.3  --- stroke risk factors include: recent stroke  --- neuro assessment patient doing well continues with left adolfo neglect/flaccid     Plan:     Serial CTH to monitor evolving stroke  Neuro checks   ECHO pending  Continue aspirin and statin   Stroke education  PT/OT  DVT prophylaxis   Neurology to follow       SHANIA Costa, CNP  11:17 AM  10/27/2020

## 2020-10-27 NOTE — PROGRESS NOTES
Pt seen for mild-mod cognitive linguistic deficit and moderate dysarthria. Pt completed oral motor exercises with fair- accuracy given mod v/c. Required repetition and intermittent encouragement from SLP and wife. Pt completed auditory comprehension/verbal expression task with fair accuracy. Pt was asked to use a picture scene to answer questions. Benefited from min-mod v/c. Continue with POC.      Laure Hess, Graduate Clinician

## 2020-10-27 NOTE — PROGRESS NOTES
Comprehensive Nutrition Assessment    Type and Reason for Visit:  Initial    Nutrition Recommendations/Plan: Continue Current Diet, Start Oral Nutrition Supplement  To order Magic Cup TID to aide in nutritional status. Nutrition Assessment:  Pt admit 2/2 acute R MCA stroke s/p mechanical thrombectomy. H/o DM however BSL WNL. Passed bedside swallow, continues to work with SLP. PO diet advanced w/ poor intake. Malnutrition Assessment:  Malnutrition Status:  Insufficient data    Context:  Acute Illness     Findings of the 6 clinical characteristics of malnutrition:  Energy Intake:  7 - 50% or less of estimated energy requirements for 5 or more days  Weight Loss:  Unable to assess     Body Fat Loss:  Unable to assess     Muscle Mass Loss:  Unable to assess    Fluid Accumulation:  No significant fluid accumulation     Strength:  Not Performed    Estimated Daily Nutrient Needs:  Energy (kcal):  [facility] [place]872 x1.1= 2137; ; Weight Used for Energy Requirements:  Current     Protein (g):  110-130(1.3-1.5gm/kg IBW); Weight Used for Protein Requirements:  Ideal          Nutrition Related Findings:  hypernatremia, active BS, soft abd, no noted edema, - I/Os      Wounds:  None       Current Nutrition Therapies:    DIET CARDIAC;     Anthropometric Measures:  · Height: 6' 2\" (188 cm)  · Current Body Weight: 203 lb (92.1 kg)(10/27 actual)   · Admission Body Weight: 220 lb (99.8 kg)(10/24 actual)    · Usual Body Weight: (EMR wt's range from 217-232# no method's x10 months)     · Ideal Body Weight: 190 lbs; % Ideal Body Weight 106.8 %   · BMI: 26.1    · BMI Categories: Overweight (BMI 25.0-29.9)(noted wt loss since admit, current fluids - at this time)       Nutrition Diagnosis:   · Inadequate oral intake related to cognitive or neurological impairment as evidenced by intake 0-25%    Nutrition Interventions:   Nutrition Education/Counseling:  Education not indicated   Coordination of Nutrition Care:  Continue to monitor

## 2020-10-27 NOTE — FLOWSHEET NOTE
Became hypotensive while sitting on side of bed with PT,symptomatic. Cardene put on hold. put in supine position in bed. Recovered without further intervention.

## 2020-10-27 NOTE — PROGRESS NOTES
Occupational Therapy  Occupational Therapy Initial Evaluation   Date:10/27/2020  Patient Name: Hilaria Carreno  MRN: [id number]  : 1955  Room: [address] [place] [address]-A    Referring Provider: SHANIA Dutta CNP     Evaluating OT: DARIEL Pulido/TELLY #[id number]      Modified Yolo Scale   Score     Description  0             No symptoms  1             No significant disability despite symptoms  2             Slight disability; able to look after own affairs  3             Moderate disability; able to ambulate without assist/ requires assist with ADLs  4             Moderate/Severe disability;requires assist to ambulate/assist with ADLs  5             Severe disability;bedridden/incontinent   6               Score:   4       AM-PAC Daily Activity Raw Score: 10/24    Recommended Adaptive Equipment: TBD       Diagnosis: Stroke, acute, thrombotic ([facility] [I63.9]  Stroke, acute, thrombotic ([facility] [I63.9]  Stroke, acute, thrombotic ([facility] [facility] [I63.9]    Reason for admission: L sided weakness, L sided neglect    Surgery/Procedure:  IR MECHANICAL ART THROMBECTOMY INTRACRANIAL 10/23    Pertinent Medical History: allergic rhinitis, leukemia, gout, thyroid disease      Precautions:  Falls, L sided weakness, L neglect, NGT     Home Living: Per wife report, Pt lives wife in a 1 story house with 3 step(s) to enter and 0 rail(s); bed/bath on 1st level. Full flight to basement with 1 handrail  Bathroom setup: ?  Equipment owned: none    Prior Level of Function: Independent with ADLs; Independent with IADLs. No AD for ambulation.    Driving: Yes  Occupation: likes to hike, bike, golf (very active at baseline)    Pain Level: pt c/o 0/10 pain  this session      Cognition: A&O: 2/4(oriented to self and time)  Follows 1 step commands    Memory: fair   Comprehension fair-   Problem solving: poor+   Judgement/safety: poor               Communication skills:            Vision: decreased L visual scanning; preferred right gaze Glasses:readers                                                   Hearing: wfl     RASS: -1  CAM-ICU: (NT) Delirium    UE Assessment:  Hand Dominance: Right [x]  Left []     ROM Strength STM goal: PRN   RUE  WFL AROM 4/5 5/5     LUE PROM WFL 0/5 Good tolerance to BUE exercises to increase strength for ADL participation        Sensation: No c/o numbness or tingling in extremities   Tone: impaired L side (flaccid)   Edema: none noted     Functional Assessment   Initial Eval Status  Date: 10/27/20 Treatment Status  Date: STG=LTG  5-14  days    Feeding NGT                        Continue to assess as able     Grooming Max A (for thoroughness of completion of task, sequencing, safety, L sided neglect to wash hands)                        Min. A   while seated in bedside chair     UB dressing Max A                        Min. A       LB dressing Dep  Mod. A   using AE as needed for safe reach/ energy conservation     Bathing Max A                        Min. A   using AE as needed for safe reach/ energy conservation       Toileting Dep                        Mod. A     Bed Mobility  Supine to sit: Max A x2    Sit to supine: Dep x2 (see comments below)                        Min. A  in prep of ADL tasks & transfers   Functional Transfers Sit to stand: NT    Stand to sit: NT                        Min. A  sit<>stand/functional bathroom transfers using AD/DME as needed for balance and safety   Functional Mobility NT                       Min. A   functional/bathroom mobility using AD as needed & demonstrating good safety     Balance Sitting:     Static: Max A     Dynamic:Max A  Standing: NT  SBA dynamic sitting balance; Min. A dynamic standing balance  during ADL tasks & transfers   Endurance/Activity Tolerance   Poor+ tolerance with light activity.   Pt sat EOB for ~10 minutes to improve sitting balance/posture/seated ADLs  Fair+  tolerance with light/moderate activity/self care routine   Visual/  Perceptual L neglect; decreased visual scanning to L sided; decreased cervical rotation to L                       Vitals:   HR at rest: 108 bpm HR at end of session: 105 bpm   Spo2 at rest:95% Spo2 at end of session 94%   BP at rest:133/65 mmHg BP at end of session 115/80 mmHg       Treatment:   · Bed mobility: Facilitated bed mobility with cues for proper body mechanics and sequencing to prepare for ADL completion. Assist of 2 for safety d/t L sided weakness. · ADL completion: Self-care retraining for the above-mentioned ADLs; training on proper hand placement, safety technique, sequencing, and energy conservation techniques. Educated pt on L sided awareness to increase independence during ADLs. · Therapeutic Exercises: B UE AROM/PROM completed at all levels to maintain strength/flexibility and to decrease edema/contractures to enhance ADL completion. · Postural Balance: Sitting balance retraining to improve righting reactions with postural changes during ADLS. Heavy assist to correct posture d/t L lateral lean. · Cognitive/Perceptual training: retraining exercises to improve attention, mentation, and spatial awareness for ADLs & transfers. Exercises for pt to look to L side of body to increase awareness for participation/safety during task. · Skilled positioning: Proper positioning to improve interaction with environment, overall functioning and decrease/prevent edema and contractures. Pillows positioned underneath BUE/BLEs. · Delirium Prevention/Management: Implementation of non-pharmacologic interventions for delirium prevention incorporated throughout session to patient. Including environmental and sensory modifications to decrease patient's internal distraction caused by delirium, and improve overall mentation. Comments: OK from RN to see patient. Upon arrival, patient lying in bed with wife present. Pt demo poor+ tolerance with poor understanding of education/techniques. At end of session, patient lying in bed. Call light within reach, all lines and tubes intact. Pt instructed on use of call light for assistance and fall prevention. Line management and environmental modifications made prior to and end of session to ensure patient safety and to increase efficiency of session. Skilled monitoring of HR, O2 saturation, blood pressure and patient's response to activity performed throughout session. After sitting EOB for ~10 minutes pt's BP started to decrease (RN present), assisted pt safely back to bed. BP able to recover to 115/80 in supine. Overall, pt presents with decreased activity tolerance, dynamic balance, functional mobility limiting completion of ADLs and safe return home. Pt can benefit from continued skilled OT to increase safety and functional independence. Evaluation Complexity: Mod    · History: Expanded chart review of consults, imaging, and psychosocial history related to current functional performance. · Exam: 5+ performance deficits identified limiting functional independence and safe return home   · Assistance/Modification: Min/mod assistance or modifications required to perform tasks. May have comorbidities that affect occupational performance.     Assessment of current deficits   Functional mobility [x]  ADLs [x] Strength [x]  Cognition [x]  Functional transfers  [x] IADLs [x] Safety Awareness [x]  Endurance [x]  Fine Motor Coordination [x] Balance [x] Vision/perception [x] Sensation []   Gross Motor Coordination [] ROM [] Delirium [x]                  Communication []    Plan of Care: OT 1-3x/week for 5-7 days PRN   [x] ADL retraining/AE recommendations   [x] Energy Conservation Techniques/Strategies      [x] Neuromuscular Re-Education      [x] Functional Transfer Training         [x] Functional Mobility Training          [x] Cognitive Re-Training          [x] Splinting/Positioning Needs           [x] Therapeutic Activity   [x]Therapeutic Exercise   [x] Visual/Perceptual   [x] Delirium Prevention/Treatment   [x] Positioning to Improve Functional Casco, Safety, and Skin Integrity   [x] Patient and/or Family Education to Increase Safety and Functional Casco   [] Other:            Rehab Potential: good for established goals    Patient / Family Goal: Not stated    Patient and/or family were instructed/educated on diagnosis, prognosis/goals and plan of care. Pt demonstrated poor understanding. [] Malnutrition indicators have been identified and nursing has been notified to ensure a dietitian consult is ordered. Mod Evaluation   Time In:1:55   Time Out: 2:25  Total Treatment Time: 23 minutes            Mins Units   OT Eval Low 36811     OT Eval Medium 97166 x 1   OT Eval High 22906     OT Re-Eval 03026     Ther Ex  54424     Manual Therapy 25981 13 1   Thera Activities 18219 10 1   ADL/Home Mgt 81651     Neuro Re-ed 54896     Orthotic manage/training  96475     Non-Billable Time               Evaluation time includes thorough review of current medical information, gathering information on past medical history/social history and prior level of function, completion of standardized testing/informal observation of tasks, assessment of data and development of POC/Goals.      Vishal Bro, OTR/L #[id number]

## 2020-10-28 NOTE — PLAN OF CARE
Problem: Skin Integrity:  Goal: Will show no infection signs and symptoms  Description: Will show no infection signs and symptoms  Outcome: Met This Shift     Problem: Skin Integrity:  Goal: Absence of new skin breakdown  Description: Absence of new skin breakdown  Outcome: Met This Shift     Problem: Falls - Risk of:  Goal: Will remain free from falls  Description: Will remain free from falls  Outcome: Met This Shift     Problem: Falls - Risk of:  Goal: Absence of physical injury  Description: Absence of physical injury  Outcome: Met This Shift     Problem: ACTIVITY INTOLERANCE/IMPAIRED MOBILITY  Goal: Mobility/activity is maintained at optimum level for patient  Outcome: Not Met This Shift

## 2020-10-28 NOTE — PROGRESS NOTES
Surgical  Neuro Science Intensive Care Unit  Critical Care  Daily Progress Note 10/28/2020    Date of Admission: 10/23/2020    CC: Follow up for stroke    HOSPITAL COURSE/OVERNIGHT EVENTS:    8[age]year old male with a PMH of CLL presents to the ED with c/o left sided weakness. Work up demonstrated R ICA occlusion and right MCA occlusion. Patient went for mechanical thrombectomy. Repeat CT scan did demonstrated expected reperfusion hemorrhage, patient admitted to ICU for further care. 10/24: More alert, answering questions appropriately, afebrile, VSS  10/28  Remains on Cardene infusion. PHYSICAL EXAM:  /81   Pulse 101   Temp 97.5 °F (36.4 °C) (Axillary)   Resp 16   Ht 6' 2\" (1.88 m)   Wt 203 lb 0.7 oz (92.1 kg)   SpO2 94%   BMI 26.07 kg/m²     Intake/Output Summary (Last 24 hours) at 10/28/2020 1521  Last data filed at 10/28/2020 1400  Gross per 24 hour   Intake 1170 ml   Output 1730 ml   Net -560 ml     General appearance:  Comfortable. Pain Description: denies    NEUROLOGIC:   RASS Score:  0  GCS:  144 - Opens eyes on own   6 - Follows simple motor commands  4 - Seems confused, disoriented       Pupil size:  Left 3 mm  Right 3 mm  Pupil reaction: Yes   PERRLA  Wiggles fingers: Left   No  Right Yes  Hand grasp:   Left: No     Right    Yes  Wiggles toes: Left   No Right  Yes  Plantar flexion: Left  No  Right   Yes  Facial droop:   none   Speech:  clear    CONSTITUTIONAL: No acute distress, lying in hospital bed. CARDIOVASCULAR: S1 S2, regular rate, regular rhythm, no murmur/gallop/rub. Monitor: NSR. PULMONARY: Bilaterally clear. No rhonchi/rales/wheezes, no use of accessory muscles. Room air. RENAL: Montalvo to gravity, clear yellow urine. Fluid balance for previous 24 hours:  - 560 ml. ABDOMEN: Soft, nontender, nondistended, nontympanic, normal bowel sounds. Diet: Dental soft. No reported nausea or vomiting. MUSCULOSKELETAL:  Moves RUE & RLE.   No movement note LUE & replace as needed. Monitor I & O. Cholocrys. Montalvo. ID: Leukocytosis. Endocrine: No acute issues. Hx of hypothyroid. Monitor BS. Levothyrroxine,    MSK:  Left weakness. Deconditioned.   ROM. Turn & reposition. PT & OT Am pac 8/24. Monitor for skin breakdown. Heme: No acute issues. Monitor CBC. Bowel regime: Glycolax  Ducolax. Pain control/Sedation: Tylenol  DVT prophylaxis: SCD  GI prophylaxis: Diet  Montalvo: Keep in place for critical care monitoring of fluid balance. Ancillary consults:  Neurosurgery, Neurology, Medicine and critical care  Patient/Family update:  Questions answered. Support given. Code status: Full code.       Disposition: Continue ICU/      Electronically signed by Esther Junior RN MSN APRN-NP [facility] NP  [facility]NS CCRN 10/28/2020 3:59 PM

## 2020-10-28 NOTE — PROGRESS NOTES
More awake & responsive. Improving. No indication for neurosurgical intervention at this time.   Neurologist on the case  Will sign off

## 2020-10-28 NOTE — PROGRESS NOTES
Dago Martin is a 60 y.o. right handed male     Neurology following for stroke    PMH of hypothyroidism, GERD and leukemia    Presented to ED on 10/23/2020 with left sided weakness, left facial paralysis and left hemineglect. He was complaining of left hand weakness the week before, therefore out of tPA window. He was a thrombectomy candidate with MARIA TERESA occlusion. CTH hypodensity in RMCA suggestive of thrombus. CTH post mechanical thrombectomy hypodensity in RMCA with mild edema and right to left 2 mm shift. CTA head/neck occluded MARIA TERESA posterior margin throughout right M1, occluded right vertebral artery. CTP brain large core infarct > 50% in RMCA territory. MRI brain shows RMCA with edema, 8 mm midline shift and petechial hemorrhage. Wife at bedside today. Patient sleepy today but able to follow commands with prompting. Na was high and now receiving water boluses and has NE tube now. Advised wife repeat CTH will be done to check status of edema. No chest pain or palpitations  No coughing or wheezing    No itching or bruising appreciated  + left side flaccid     ROS otherwise negative      Objective:     /81   Pulse 91   Temp 98.2 °F (36.8 °C) (Axillary)   Resp 16   Ht 6' 2\" (1.88 m)   Wt 203 lb 0.7 oz (92.1 kg)   SpO2 95%   BMI 26.07 kg/m²     General appearance: alert, appears stated age, cooperative and no distress  Head: normocephalic, without obvious abnormality, atraumatic  Eyes: conjunctivae/corneas clear  Neck: symmetrical, trachea midline   Lungs: respirations unlabored   Heart: sinus tach on monitor  Abdomen: soft, non-tender  Extremities:  normal, atraumatic, no cyanosis, left hand 1+ edema no pitting   Pulses: 2+ and symmetric  Skin: color, texture, turgor normal---no rashes or lesions      Mental Status: Alert and oriented to self, time and place. Follows commands on the right.      Poor attention/concentration    Speech: moderate dysarthria   Language: no aphasias 10/23/2020    ALKPHOS 79 10/23/2020    AST 26 10/23/2020    ALT 22 10/23/2020     Hepatic Function Panel:    Lab Results   Component Value Date    ALKPHOS 79 10/23/2020    ALT 22 10/23/2020    AST 26 10/23/2020    PROT 6.9 10/23/2020    BILITOT 0.5 10/23/2020    LABALBU 4.3 10/23/2020    LABALBU 4.8 04/27/2012     HgBA1c:    Lab Results   Component Value Date    LABA1C 5.3 10/25/2020     FLP:    Lab Results   Component Value Date    TRIG 101 10/25/2020    HDL 48 10/25/2020    LDLCALC 91 10/25/2020    LABVLDL 20 10/25/2020     CTH: Findings suspicious significant right MCA infarct.  Hyperdensity in the right   MCA suggest thrombus. Significant amount of mucosal thickening in the sinuses particularly ethmoid air cells. IR mechanical art thrombectomy: RIGHT middle cerebral artery occlusion and right internal carotid artery occlusion status post successful mechanical thrombectomy. with   TICI 3 recanalization     Repeat CTH 10/25/20: 1. Encephalomalacia is seen within the right cerebral hemisphere consistent with a subacute large right MCA and BASSAM distribution infarct. 2. Cytotoxic edema seen throughout the right cerebral hemisphere with   approximately 5 mm right to left subfalcine herniation. 3. There is no acute intracranial hemorrhage. CTA head/neck: 1.  Occluded right internal carotid artery from the posterior margin   through the right M1 and likely into segment . Occluded right   vertebral artery from the origin to the basilar artery.    2. Estimated stenosis of the proximal left internal carotid artery by   NASCET criteria is not hemodynamically significant     CTP brain: There is a large core infarct exceeding 50% in the region of the   distribution of the middle cerebral artery with a relatively small   region of peripheral ischemia     MRI brain: Acute infarct right middle cerebral artery territory with edema, midline   shift and petechial hemorrhage.  This demonstrates some interval progression allowing for difference in technique.      All labs and imaging studies reviewed independently    Assessment:     Right MCA/BASSAM stroke due to MARIA TERESA occlusion, atheroembolic, artery to artery s/p thrombectomy  --- TICI 3 recanalization   --- MRI brain shows right MCA territory infarct with cerebral edema, 8 mm midline shift and petechial hemorrhage---evolving stroke  --- LDL 91 and A1c 5.3  --- stroke risk factors include: recent stroke  --- neuro assessment patient doing well continues with left adolfo neglect/flaccid     Plan:     CTH   Neuro checks   ECHO pending  Continue aspirin and statin   Stroke education  PT/OT  DVT prophylaxis   Neurology to follow       SHANIA Iraheta, CNP  11:14 AM  10/28/2020

## 2020-10-28 NOTE — PROGRESS NOTES
Subjective: He would eat if possible  Wife is by the bedside  Objective:    /81   Pulse 102   Temp 98.3 °F (36.8 °C) (Axillary)   Resp 17   Ht 6' 2\" (1.88 m)   Wt 203 lb 0.7 oz (92.1 kg)   SpO2 94%   BMI 26.07 kg/m²     24HR INTAKE/OUTPUT:      Intake/Output Summary (Last 24 hours) at 10/28/2020 1615  Last data filed at 10/28/2020 1500  Gross per 24 hour   Intake 1170 ml   Output 1760 ml   Net -590 ml       General appearance: NAD, answering questions following commands on right side flaccid on the left  Neck: FROM, supple   Lungs: Clear bilaterally no wheezes, no rhonchi, no crackles  CV: RRR, no MRGs; normal carotid upstroke and amplitude without Bruits  Abdomen: Soft, non-tender; no masses or HSM  Extremities: No edema, no cyanosis, no clubbing  Skin: Intact no rash, no lesions, no ulcers    Psych: Alert and oriented normal affect  Neuro: Left-sided hemiparesis minimal withdrawal left lower extremity to noxious stimuli  Most Recent Labs  Lab Results   Component Value Date    WBC 14.6 (H) 10/28/2020    HGB 14.1 10/28/2020    HCT 44.1 10/28/2020     10/28/2020     (H) 10/28/2020    K 3.6 10/28/2020     (H) 10/28/2020    CREATININE 0.7 10/28/2020    BUN 51 (H) 10/28/2020    CO2 26 10/28/2020    GLUCOSE 109 (H) 10/28/2020    ALT 22 10/23/2020    AST 26 10/23/2020    INR 1.0 10/23/2020    TSH 2.920 06/04/2020    LABA1C 5.3 10/25/2020    LABMICR <12.0 03/16/2015     Recent Labs     10/28/20  0436   MG 2.7*     Lab Results   Component Value Date    CALCIUM 9.9 10/28/2020    PHOS 4.5 10/28/2020        CT HEAD WO CONTRAST   Final Result   Stable extensive right middle cerebral and anterior cerebral infarcts with   petechial hemorrhage and stable mass-effect. MRI BRAIN WO CONTRAST   Final Result   Acute infarct right middle cerebral artery territory with edema, midline   shift and petechial hemorrhage.   This demonstrates some interval progression   allowing for difference in technique. CT HEAD WO CONTRAST   Final Result   1. Encephalomalacia is seen within the right cerebral hemisphere consistent   with a subacute large right MCA and BASSAM distribution infarct. 2. Cytotoxic edema seen throughout the right cerebral hemisphere with   approximately 5 mm right to left subfalcine herniation. 3. There is no acute intracranial hemorrhage. 4.      XR ABDOMEN (KUB) (SINGLE AP VIEW)   Final Result   Enteric tube in the stomach as above. No evidence of bowel obstruction. CT HEAD WO CONTRAST   Final Result   1. Diffuse pattern of cytotoxic edema in the right cerebral hemisphere in the   distribution of the anterior and middle cerebral arteries likely correlating   to recent stroke. There is sulcal effacement and mass effect upon the right   lateral ventricle. 2-3 mm subfalcine herniation from right to left. 2. No evidence of intracranial hemorrhage. Decreased density of presumed   contrast observed on prior CT. CT HEAD WO CONTRAST   Final Result   1. Hyperdensity in the right MCA distribution likely represents contrast   staining from recent interventional procedure. 2. Mild edema in the right cerebral hemisphere resulting in minimal right to   left shift of approximately 2 mm. IR MECHANICAL ART THROMBECTOMY INTRACRANIAL   Final Result      XR CHEST PORTABLE   Final Result   Diffuse prominent reticular markings may be related to interstitial edema. No airspace consolidation. CTA NECK W CONTRAST   Final Result   1. Occluded right internal carotid artery from the posterior margin   through the right M1 and likely into segment . Occluded right   vertebral artery from the origin to the basilar artery.    2. Estimated stenosis of the proximal left internal carotid artery by   NASCET criteria is not hemodynamically significant               CT BRAIN PERFUSION   Final Result      There is a large core infarct exceeding 50% in the region of the distribution of the middle cerebral artery with a relatively small   region of peripheral ischemia            CTA HEAD W CONTRAST   Final Result   1. Occluded right internal carotid artery from the posterior margin   through the right M1 and likely into segment . Occluded right   vertebral artery from the origin to the basilar artery. 2. Estimated stenosis of the proximal left internal carotid artery by   NASCET criteria is not hemodynamically significant               CT HEAD WO CONTRAST   Final Result   Findings suspicious significant right MCA infarct. Hyperdensity in the right   MCA suggest thrombus. Significant amount of mucosal thickening in the sinuses particularly ethmoid   air cells. Critical finding: Results were called to Dr. Shmuel Garcia  on 10/23/2020 at 1250   hours         XR CHEST PORTABLE    (Results Pending)     Repeat CT head reviewed cerebral edema edema with subfalcine herniation  Assessment    Principal Problem:    Stroke, acute, thrombotic ([address]  Active Problems:    Acquired hypothyroidism    Diabetes mellitus type 2 in nonobese ([address]    HLD (hyperlipidemia)    Cerebral edema ([address]  Resolved Problems:    * No resolved hospital problems.  *      Plan:  58-year-old male history of diabetes hypertension hyperlipidemia, hypothyroidism admitted with acute right MCA stroke status post mechanical thrombectomy admitted to ICU     3% saline for cerebral edema  Monitor labs closely  ASA  Nicardipine drip-  Continue to monitor closely in ICU  High intensity statin  Check fasting lipid panel-LDL 91  Check hemoglobin A1c 5.3  Neurology consult appreciated    Nicardipine drip is being weaned off  Speech is recommending dental soft diet with thin liquids  PM&R consultation also    Discussed with nursing  Electronically signed by Jo-Ann Morales MD on 10/28/2020 at 4:15 PM

## 2020-10-28 NOTE — PROGRESS NOTES
[facility]  PROGRESS NOTE  ATTENDING NOTE    CRITICAL CARE    Chief Complaint   Patient presents with    Cerebrovascular Accident     Wife last saw pt at 10pm, Pt's mom found pt this am with L side weakness       HPI  The patient is a 60 y.o. male patient of Dr Birdie Mercedes history of leukemia, gout, hypothyroid who presents with acute CVA. Patient presents emergency department by EMS with left-sided weakness yesterday. He was last seen normal previous night at 10 PM.  Was found on floor at 11 AM with left hemiplegia. Per wife patient did have left hand weakness starting about a week ago. He did not seek attention because he thought this was secondary to working too hard moving his mother. Patient was stroke team in the ED. There was telestroke consultation. patient was considered the outside TPA window. Neuro interventionalist was consulted and patient underwent mechanical thrombectomy. Pt is a poor historian. History is largely obtained from chart review and discussions with staff/family as available. He was under the care of neurology recently, but providers have changed, therefore critical care is warranted again to progress care and out of the ICU. Patient remains on cardene gtt and has been getting some nutrition PO.       Patient Active Problem List   Diagnosis    Chronic lymphocytic leukemia ([address]    Acquired hypothyroidism    Chronic idiopathic gout of left foot    Recurrent genital herpes    Diabetes mellitus type 2 in nonobese ([address]    Prediabetes    Stroke, acute, thrombotic ([address]    HLD (hyperlipidemia)    Cerebral edema (HCC)       OVERNIGHT EVENTS:  No issues overnight    HOSPITAL COURSE:  8[age]year old male with a PMH of CLL presents to the ED with c/o left sided weakness.  Work up demonstrated R ICA occlusion and right MCA occlusion.  Patient went for mechanical thrombectomy.  Repeat CT scan did demonstrated expected reperfusion hemorrhage, patient admitted to ICU for further care. 10/24: More alert, answering questions appropriately, afebrile, VSS  10/28  Remains on Cardene infusion. no BM since admission, takes a long time to eat even a cup of applesauce    /81   Pulse 102   Temp 98.3 °F (36.8 °C) (Axillary)   Resp 17   Ht 6' 2\" (1.88 m)   Wt 203 lb 0.7 oz (92.1 kg)   SpO2 94%   BMI 26.07 kg/m²   Physical Exam  Constitutional:       General: He is not in acute distress. Comments: Left hemineglect   HENT:      Head: Normocephalic and atraumatic. Nose: Nose normal.      Mouth/Throat:      Pharynx: Oropharynx is clear. Eyes:      Extraocular Movements: Extraocular movements intact. Pupils: Pupils are equal, round, and reactive to light. Neck:      Musculoskeletal: Normal range of motion and neck supple. Cardiovascular:      Rate and Rhythm: Normal rate and regular rhythm. Pulses: Normal pulses. Heart sounds: Normal heart sounds. Pulmonary:      Effort: Pulmonary effort is normal.      Breath sounds: Normal breath sounds. Abdominal:      General: There is no distension. Palpations: Abdomen is soft. Tenderness: There is no abdominal tenderness. Musculoskeletal:      Right lower leg: No edema. Left lower leg: No edema. Comments: Left side flaccid   Skin:     General: Skin is warm and dry. Neurological:      Mental Status: He is alert. Comments: Answers questions slowly but appropriately  Left hemineglect with left sided flaccidity   Psychiatric:         Mood and Affect: Mood normal.         Behavior: Behavior normal.         Thought Content: Thought content normal.         Judgment: Judgment normal.         Lines: Whitehead:  yes - will remove whitehead  Central line:  no  PICC:  yes - right brachial    CAM-ICU:  negative  RASS:  RASS 0 (Alert and Calm)    ASSESSMENT/PLAN:  1. Large right MCA stroke--ASA, echo, neurology following  2. Constipation--start bowel reg, dulcolax  3.   Moderate

## 2020-10-28 NOTE — PROGRESS NOTES
Dr. Destiney Kamara notified of NA [phone]. New orders obtained for free water boluses, 500cc distributed [id number] hour shift.

## 2020-10-29 NOTE — PROGRESS NOTES
[facility]  PROGRESS NOTE  ATTENDING NOTE    CRITICAL CARE    Chief Complaint   Patient presents with    Cerebrovascular Accident     Wife last saw pt at 10pm, Pt's mom found pt this am with L side weakness       HPI  The patient is a 60 y.o. male patient of Dr Val Frederick history of leukemia, gout, hypothyroid who presents with acute CVA. Patient presents emergency department by EMS with left-sided weakness yesterday. He was last seen normal previous night at 10 PM.  Was found on floor at 11 AM with left hemiplegia. Per wife patient did have left hand weakness starting about a week ago. He did not seek attention because he thought this was secondary to working too hard moving his mother. Patient was stroke team in the ED. There was telestroke consultation. patient was considered the outside TPA window. Neuro interventionalist was consulted and patient underwent mechanical thrombectomy. Pt is a poor historian. History is largely obtained from chart review and discussions with staff/family as available. He was under the care of neurology recently, but providers have changed, therefore critical care is warranted again to progress care and out of the ICU. Patient remains on cardene gtt and has been getting some nutrition PO.       Patient Active Problem List   Diagnosis    Chronic lymphocytic leukemia ([address]    Acquired hypothyroidism    Chronic idiopathic gout of left foot    Recurrent genital herpes    Diabetes mellitus type 2 in nonobese ([address]    Prediabetes    Stroke, acute, thrombotic ([address]    HLD (hyperlipidemia)    Cerebral edema (HCC)       OVERNIGHT EVENTS:  No issues overnight, tolerating tube feeds at night    HOSPITAL COURSE:  [age]year old male with a PMH of CLL presents to the ED with c/o left sided weakness.  Work up demonstrated R ICA occlusion and right MCA occlusion.  Patient went for mechanical thrombectomy.  Repeat CT scan did demonstrated expected reperfusion hemorrhage, patient admitted to ICU for further care. 10/24: More alert, answering questions appropriately, afebrile, VSS  10/28  Remains on Cardene infusion. no BM since admission, takes a long time to eat even a cup of applesauce  10/29:  Acyclovir restarted for chronic HSV infections    BP (!) 150/90   Pulse 69   Temp 98 °F (36.7 °C) (Temporal)   Resp 13   Ht 6' 2\" (1.88 m)   Wt 200 lb 1.6 oz (90.8 kg)   SpO2 96%   BMI 25.69 kg/m²   Physical Exam  Constitutional:       General: He is not in acute distress. Comments: Left hemineglect   HENT:      Head: Normocephalic and atraumatic. Nose: Nose normal.      Mouth/Throat:      Pharynx: Oropharynx is clear. Eyes:      Extraocular Movements: Extraocular movements intact. Pupils: Pupils are equal, round, and reactive to light. Neck:      Musculoskeletal: Normal range of motion and neck supple. Cardiovascular:      Rate and Rhythm: Normal rate and regular rhythm. Pulses: Normal pulses. Heart sounds: Normal heart sounds. Pulmonary:      Effort: Pulmonary effort is normal.      Breath sounds: Normal breath sounds. Abdominal:      General: There is no distension. Palpations: Abdomen is soft. Tenderness: There is no abdominal tenderness. Musculoskeletal:      Right lower leg: No edema. Left lower leg: No edema. Comments: Left side flaccid   Skin:     General: Skin is warm and dry. Neurological:      Mental Status: He is alert. Comments: Answers questions slowly but appropriately  Left hemineglect with left sided flaccidity   Psychiatric:         Mood and Affect: Mood normal.         Behavior: Behavior normal.         Thought Content: Thought content normal.         Judgment: Judgment normal.         Lines: Whitehead:  yes - will remove whitehead  Central line:  no  PICC:  yes - right brachial    CAM-ICU:  negative  RASS:  RASS 0 (Alert and Calm)    ASSESSMENT/PLAN:  1.   Large right MCA stroke--ASA, echo, neurology following  2. Constipation--start bowel reg, dulcolax  3. Moderate malnutrition--passed for diet, will provide tube feeds at night until ensured taking adequate PO  4. Iatrogenic hypernatremia--continue to monitor, but start free water  5. Dehydration--free water  6. Leukocytosis--check UA and procalcitonin and CXR--ok and WBC improved  7.   HTN--start norvasc, c/w SBP <150; wean cardene    DVT/GI ppx--heparin, lansoprazole    Ok to transfer out of ICU    Meek Cm MD, MSc, FACS  10/29/2020  5:51 PM

## 2020-10-29 NOTE — PROGRESS NOTES
Subjective:    Mentation is ok    Objective:    /88   Pulse 84   Temp 97.4 °F (36.3 °C) (Temporal)   Resp 23   Ht 6' 2\" (1.88 m)   Wt 200 lb 1.6 oz (90.8 kg)   SpO2 94%   BMI 25.69 kg/m²     24HR INTAKE/OUTPUT:      Intake/Output Summary (Last 24 hours) at 10/29/2020 1137  Last data filed at 10/29/2020 0900  Gross per 24 hour   Intake 1613 ml   Output 1925 ml   Net -312 ml       General appearance: NAD, answering questions following commands on right side flaccid on the left  Neck: FROM, supple   Lungs: Clear bilaterally no wheezes, no rhonchi, no crackles  CV: RRR, no MRGs; normal carotid upstroke and amplitude without Bruits  Abdomen: Soft, non-tender; no masses or HSM  Extremities: No edema, no cyanosis, no clubbing  Skin: Intact no rash, no lesions, no ulcers    Psych: Alert and oriented normal affect  Neuro: Left-sided hemiparesis minimal withdrawal left lower extremity to noxious stimuli  Most Recent Labs  Lab Results   Component Value Date    WBC 13.8 (H) 10/29/2020    HGB 13.6 10/29/2020    HCT 43.5 10/29/2020     10/29/2020     (H) 10/29/2020    K 3.6 10/29/2020     (H) 10/29/2020    CREATININE 0.8 10/29/2020    BUN 45 (H) 10/29/2020    CO2 27 10/29/2020    GLUCOSE 129 (H) 10/29/2020    ALT 22 10/23/2020    AST 26 10/23/2020    INR 1.0 10/23/2020    TSH 0.958 10/29/2020    LABA1C 5.3 10/25/2020    LABMICR <12.0 03/16/2015     Recent Labs     10/29/20  0505   MG 2.5     Lab Results   Component Value Date    CALCIUM 9.1 10/29/2020    PHOS 3.6 10/29/2020        XR CHEST PORTABLE   Final Result   Nonacute chest with atelectasis in the lung bases. CT HEAD WO CONTRAST   Final Result   Stable extensive right middle cerebral and anterior cerebral infarcts with   petechial hemorrhage and stable mass-effect. MRI BRAIN WO CONTRAST   Final Result   Acute infarct right middle cerebral artery territory with edema, midline   shift and petechial hemorrhage.   This demonstrates some interval progression   allowing for difference in technique. CT HEAD WO CONTRAST   Final Result   1. Encephalomalacia is seen within the right cerebral hemisphere consistent   with a subacute large right MCA and BASSAM distribution infarct. 2. Cytotoxic edema seen throughout the right cerebral hemisphere with   approximately 5 mm right to left subfalcine herniation. 3. There is no acute intracranial hemorrhage. 4.      XR ABDOMEN (KUB) (SINGLE AP VIEW)   Final Result   Enteric tube in the stomach as above. No evidence of bowel obstruction. CT HEAD WO CONTRAST   Final Result   1. Diffuse pattern of cytotoxic edema in the right cerebral hemisphere in the   distribution of the anterior and middle cerebral arteries likely correlating   to recent stroke. There is sulcal effacement and mass effect upon the right   lateral ventricle. 2-3 mm subfalcine herniation from right to left. 2. No evidence of intracranial hemorrhage. Decreased density of presumed   contrast observed on prior CT. CT HEAD WO CONTRAST   Final Result   1. Hyperdensity in the right MCA distribution likely represents contrast   staining from recent interventional procedure. 2. Mild edema in the right cerebral hemisphere resulting in minimal right to   left shift of approximately 2 mm. IR MECHANICAL ART THROMBECTOMY INTRACRANIAL   Final Result      XR CHEST PORTABLE   Final Result   Diffuse prominent reticular markings may be related to interstitial edema. No airspace consolidation. CTA NECK W CONTRAST   Final Result   1. Occluded right internal carotid artery from the posterior margin   through the right M1 and likely into segment . Occluded right   vertebral artery from the origin to the basilar artery.    2. Estimated stenosis of the proximal left internal carotid artery by   NASCET criteria is not hemodynamically significant               CT BRAIN PERFUSION   Final Result      There is a large core infarct exceeding 50% in the region of the   distribution of the middle cerebral artery with a relatively small   region of peripheral ischemia            CTA HEAD W CONTRAST   Final Result   1. Occluded right internal carotid artery from the posterior margin   through the right M1 and likely into segment . Occluded right   vertebral artery from the origin to the basilar artery. 2. Estimated stenosis of the proximal left internal carotid artery by   NASCET criteria is not hemodynamically significant               CT HEAD WO CONTRAST   Final Result   Findings suspicious significant right MCA infarct. Hyperdensity in the right   MCA suggest thrombus. Significant amount of mucosal thickening in the sinuses particularly ethmoid   air cells. Critical finding: Results were called to Dr. Te Soto  on 10/23/2020 at 1250   hours           Repeat CT head reviewed cerebral edema edema with subfalcine herniation  Assessment    Principal Problem:    Stroke, acute, thrombotic ([address]  Active Problems:    Acquired hypothyroidism    Diabetes mellitus type 2 in nonobese ([address]    HLD (hyperlipidemia)    Cerebral edema ([address]  Resolved Problems:    * No resolved hospital problems.  *      Plan:  29-year-old male history of diabetes hypertension hyperlipidemia, hypothyroidism admitted with acute right MCA stroke status post mechanical thrombectomy admitted to ICU     3% saline for cerebral edema  Monitor labs closely  ASA  Nicardipine drip-  Continue to monitor closely in ICU  High intensity statin  Check fasting lipid panel-LDL 91  Check hemoglobin A1c 5.3  Neurology consult appreciated    Off nicardipine  On tube feeds and oral feeds  To acute rehab soon      Electronically signed by Mimi Husain MD on 10/29/2020 at 11:37 AM

## 2020-10-29 NOTE — PLAN OF CARE
Problem: Skin Integrity:  Goal: Will show no infection signs and symptoms  Description: Will show no infection signs and symptoms  10/29/2020 0747 by Felicia Anne RN  Outcome: Met This Shift     Problem: Falls - Risk of:  Goal: Will remain free from falls  Description: Will remain free from falls  10/29/2020 0747 by Felicia Anne RN  Outcome: Met This Shift     Problem: HEMODYNAMIC STATUS  Goal: Patient has stable vital signs and fluid balance  10/29/2020 0747 by Felicia Anne RN  Outcome: Met This Shift     Problem: ACTIVITY INTOLERANCE/IMPAIRED MOBILITY  Goal: Mobility/activity is maintained at optimum level for patient  10/29/2020 0747 by Felicia Anne RN  Outcome: Met This Shift

## 2020-10-29 NOTE — CONSULTS
PM&R Consult Note  Patient: Bobby Villa  Age/sex: 60 y.o. male  Medical Record #: [id number]      Referring physician: García Luther MD  Consulting physician: Dr. Brigido Ferraro MD  Primary care provider: García Luther MD        Chief complaint:   Impairments and acitivities limitations in ADLs, mobility, and cognition secondary to right MCA/BASSAM stroke      HPI:   Bobby Villa is a 60 y.o. male who presented to [facility] on 10/23/2020 due to sudden onset left sided weakness and left sided neglect. He had been complaining of left arm tingling and numbness for a few days prior to that. He was found to have acute right MCA stroke. CT angiogram confirming M1 occlusion of the right MCA artery as well as right ICA occlusion. He underwent right ICA and MCA thrombectomy. Repeat CT showed expected reperfusion hemorrhage. He was started on hypertonic saline for management of cerebral edema and NSGY is following. He is on Cardene drip for HTN. He is on ASA and statin for secondary stroke prevention. He is on a diet, but requiring supplemental tube feeds. He has participated in therapy evaluations.         Past Medical History:   Diagnosis Date    Allergic rhinitis     had allergy shots    Cancer ([address]     leukemia    Gout     Thyroid disease      Past Surgical History:   Procedure Laterality Date    BACK SURGERY      [facility]    IR MECHANICAL ART THROMBECTOMY INTRACRANIAL  10/23/2020    IR MECHANICAL ART THROMBECTOMY INTRACRANIAL 10/23/2020 [facility]    TUMOR EXCISION      melanoma on the back       Family History   Problem Relation Age of Onset    Coronary Art Dis Father         aged 61    Breast Cancer Mother     Mult Sclerosis Sister        Allergies   Allergen Reactions    Bactrim [Sulfamethoxazole-Trimethoprim] Rash       Scheduled Meds:  acyclovir, 400 mg, TID  amLODIPine, 10 mg, Daily  polyethylene glycol, 17 g, Daily  bisacodyl, 10 mg, Daily  senna, 1 tablet, Nightly  lansoprazole, 30 mg, QAM AC  heparin (porcine), 5,000 Units, Q8H  atorvastatin, 40 mg, Nightly  colchicine, 0.6 mg, Daily  Febuxostat, 80 mg, Daily  levothyroxine, 50 mcg, Daily  aspirin, 81 mg, Daily  heparin flush, 3 mL, 2 times per day  sodium chloride flush, 10 mL, 2 times per day        PRN Meds  heparin flush, 3 mL, PRN  sodium chloride flush, 10 mL, PRN  acetaminophen, 650 mg, Q6H PRN    Or  acetaminophen, 650 mg, Q6H PRN  ondansetron, 4 mg, Q6H PRN  hydrALAZINE, 10 mg, Q10 Min PRN  labetalol, 10 mg, Q10 Min PRN        Social History:  Social History     Socioeconomic History    Marital status:      Spouse name: Not on file    Number of children: 1    Years of education: Not on file    Highest education level: Not on file   Occupational History    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   Social Needs    Financial resource strain: Not on file    Food insecurity     Worry: Not on file     Inability: Not on file   Gigantt needs     Medical: Not on file     Non-medical: Not on file   Tobacco Use    Smoking status: Never Smoker    Smokeless tobacco: Never Used   Substance and Sexual Activity    Alcohol use: No     Alcohol/week: 0.0 standard drinks     Comment: social    Drug use: No    Sexual activity: Not on file   Lifestyle    Physical activity     Days per week: Not on file     Minutes per session: Not on file    Stress: Not on file   Relationships    Social connections     Talks on phone: Not on file     Gets together: Not on file     Attends Rastafari service: Not on file     Active member of club or organization: Not on file     Attends meetings of clubs or organizations: Not on file     Relationship status: Not on file    Intimate partner violence     Fear of current or ex partner: Not on file     Emotionally abused: Not on file     Physically abused: Not on file     Forced sexual activity: Not on file   Other Topics Concern    Not on file Social History Narrative    Not on file       Home situation: Lives with wife in 1 level home with 3 steps to enter and 0 rails. Bed/bath on 1st floor       Functional History:  Premorbid ADL's: independent   Premorbid Mobility: independent   Present: significant decrease in mobility and function. Physical Therapy:  Bed mobility: Max A  Transfers: NT due to hypotension at EOB  Ambulation: NT    Occupational Therapy:  Feeding: NGT  Grooming: Max A  UB dressing: Max A  LB dressing: Dependent     Review Of Systems:   ROS limited as patient drowsy at time of exam. Denies pain, cp, sob. Physical Examination:  Vitals:   Vitals:    10/29/20 1200 10/29/20 1300 10/29/20 1400 10/29/20 1500   BP: (!) 140/82 (!) 143/85 133/80 (!) 149/90   Pulse: 81 81 80 71   Resp: 16 17 16 18   Temp: 97.8 °F (36.6 °C)      TempSrc: Temporal      SpO2: 97% 96% 97% 96%   Weight:       Height:           GEN: NAD, resting in bed. Lethargic, falling asleep repeatedly during exam. Nurse reports he was more alert earlier in the day  HEENT: NC/AT, PERRL  RESP: CTAB, no R/R/W  CV: RRR  ABD: soft, NT, ND, normal BS   EXT: No erythema/clubbing/cyanosis  Skin: no rash or lesion  Psych: lethargic     Neuro Exam:  -Lethargic. Arouses, but falling asleep repeatedly throughout exam.   -Oriented to self, place, month  -Speech mildly dysarthric, language exam limited due to lethargy    Cranial Nerves:  I: olfactory not tested  II Left visual field appears impaired  III, IV, VI: R gaze preference, R eye does not cross midline   Pupils (II, III): ERRL  V: Facial Sensation/Jaw clench: decreased on L  VII: Facial Motor: Left facial droop  VIII.  Hearing: intact for conversation   XI/X: Palate: deferred, pt lethargic  XI: Shoulder shrug/SCM: decreased on L  XII: Tongue: deviated left       Neglect testing: Left neglect     Coordination:  F - N: patient does not participate with task     Sensory:    LT: responds to stimulation on the right, no response to painful stimuli on the left. Unreliable reporting from patient due to lethargy    Motor:    Moves RUE and RLE with 4/5 strength grossly. Strength 0//5 left upper and lower extremities. DTRs:  Reflexes 2+ throughout     Laboratory:    Lab Results   Component Value Date    WBC 13.8 (H) 10/29/2020    HGB 13.6 10/29/2020    HCT 43.5 10/29/2020    .5 (H) 10/29/2020     10/29/2020     Lab Results   Component Value Date     10/29/2020    K 3.6 10/29/2020     10/29/2020    CO2 27 10/29/2020    BUN 45 10/29/2020    CREATININE 0.8 10/29/2020    GLUCOSE 129 10/29/2020    GLUCOSE 112 04/27/2012    CALCIUM 9.1 10/29/2020      Lab Results   Component Value Date    ALT 22 10/23/2020    AST 26 10/23/2020    ALKPHOS 79 10/23/2020    BILITOT 0.5 10/23/2020       Lab Results   Component Value Date    COLORU Yellow 10/28/2020    NITRU Negative 10/28/2020    GLUCOSEU Negative 10/28/2020    KETUA Negative 10/28/2020    UROBILINOGEN 0.2 10/28/2020    BILIRUBINUR Negative 10/28/2020     Lab Results   Component Value Date    LABA1C 5.3 10/25/2020           IMPRESSION:  Concetta Mcclure is a 60 y.o. male with impairments and acitivities limitations in ADLs, mobility, and cognition secondary to right MCA/BASSAM stroke      Recommendations:   Patient with severe functional impairments, inability to participate with out of bed activity, and poor activity tolerance overall on last therapy evaluations 10/27. Will require ARU vs ANDRE pending functional progress and activity tolerance. Patient needs updated PT and OT evaluations. Will await updated therapy notes to determine ARU appropriateness. Will follow. Thank you for the consult.     David Stone MD  Physical Medicine and Rehabilitation

## 2020-10-29 NOTE — PROGRESS NOTES
Surgical  Neuro Science Intensive Care Unit  Critical Care  Daily Progress Note 10/29/2020    Date of Admission: 10/23/2020    CC: Follow up for stroke    HOSPITAL COURSE/OVERNIGHT EVENTS:    [age]year old male with a PMH of CLL presents to the ED with c/o left sided weakness. Work up demonstrated R ICA occlusion and right MCA occlusion. Patient went for mechanical thrombectomy. Repeat CT scan did demonstrated expected reperfusion hemorrhage, patient admitted to ICU for further care. 10/24: More alert, answering questions appropriately, afebrile, VSS  10/28  Remains on Cardene infusion. Norvasc started. Tube feeding anat night. Diet durng the day. Free water added. 10/29 No issues overnight. PHYSICAL EXAM:  /86   Pulse 82   Temp 97.4 °F (36.3 °C) (Temporal)   Resp 20   Ht 6' 2\" (1.88 m)   Wt 200 lb 1.6 oz (90.8 kg)   SpO2 98%   BMI 25.69 kg/m²     Intake/Output Summary (Last 24 hours) at 10/29/2020 0805  Last data filed at 10/29/2020 0600  Gross per 24 hour   Intake 1613 ml   Output 1725 ml   Net -112 ml     General appearance:  Comfortable. Pain Description: denies    NEUROLOGIC:   RASS Score:  0  GCS:  14 - Opens eyes on own   6 - Follows simple motor commands  4 - Seems confused, disoriented       Pupil size:  Left 3 mm  Right 3 mm  Pupil reaction: Yes   PERRLA  Wiggles fingers: Left   No  Right Yes  Hand grasp:   Left: No     Right    Yes  Wiggles toes: Left   No Right  Yes  Plantar flexion: Left  No  Right   Yes  Facial droop:   none   Speech:  clear    CONSTITUTIONAL: No acute distress, lying in hospital bed. CARDIOVASCULAR: S1 S2, regular rate, regular rhythm, no murmur/gallop/rub. Monitor: NSR. PULMONARY: Bilaterally clear. No rhonchi/rales/wheezes, no use of accessory muscles. Room air. RENAL: Montalvo to gravity, clear yellow urine. Fluid balance for previous 24 hours:  - 112 ml. ABDOMEN: Soft, nontender, nondistended, nontympanic, normal bowel sounds.    Diet: Dental soft. No reported nausea or vomiting. MUSCULOSKELETAL:  Moves RUE & RLE. No movement note LUE & LLE.    SKIN/EXTREMITIES: No rashes/ecchymosis, no edema/clubbing, warm/dry, good capillary refill. LINES:  Right Brachial PIC. Day 6. No palpable cord. Recent Labs     10/27/20  0400 10/28/20  0436 10/29/20  0505   WBC 15.7* 14.6* 13.8*   HGB 14.3 14.1 13.6   HCT 44.9 44.1 43.5   MCV 97.6 99.5 100.5*    313 261       Recent Labs     10/27/20  0400  10/27/20  1430  10/28/20  0436 10/28/20  1015 10/28/20  1430 10/29/20  0505   *   < > 160*   < > 156* 157* 158* 153*   K 2.9*  --  3.1*  --  3.6  --   --  3.6   CO2 25  --   --   --  26  --   --  27   PHOS 3.9  --   --   --  4.5  --   --  3.6   BUN 32*  --   --   --  51*  --   --  45*   CREATININE 0.7  --   --   --  0.7  --   --  0.8    < > = values in this interval not displayed. ASSESSMENT/PLAN:     Principal Problem:    Stroke, acute, thrombotic ([facility] [address]  Active Problems:    Acquired hypothyroidism    Diabetes mellitus type 2 in nonobese (HCC)    HLD (hyperlipidemia)    Cerebral edema ([facility])  Resolved Problems:    * No resolved hospital problems. *    Neuro:  Right MCA/BASSAM stroke due to MARIA TERESA occlusion. Petechial hemorrhage. S/p Thrombectomy. Cerebral edema. Monitor neuro status. Neurology following. Neurosurgery following. Stroke education. Stroke protocol. Speech therapy following. CV:  Hypertensive. Hx of Hypercholesterolemia    Monitor hemodynamics. BP goal systolic less than 168 mm Hg. Cardene infusing. PRN hydralzine & labetalol. Statin. ASA. Echo completed-no abnormality. Norvasc. Pulm: . At risk for respiratory insufficiency. Hx allergic rhinitis. Monitor RR & SpO2. O2 as needed. Encourage cough, SMI  & deep breathing. GI:   Dysphagia. BMI 26  Monitor bowel function. Diet:  CDental soft. Tube feeding at night. .       Dietary supplements. Zofran. Bowel regime. Renal:  No acute issues. Hx of gout  Monitor BUN & Cr, electrolytes & replace as needed. Monitor I & O. Cholocrys. Febuoxstat. ID: Leukocytosis. Acyclovir (sustitue for home med-Valtrex). Endocrine: No acute issues. Hx of hypothyroid. Monitor BS. Levothyrroxine,    MSK:  Left weakness. Deconditioned.   ROM. Turn & reposition. PT & OT Am pac 8/24. Monitor for skin breakdown. Heme: No acute issues. Monitor CBC. Bowel regime: Glycolax  Ducolax. Pain control/Sedation: Tylenol  DVT prophylaxis: SCD  GI prophylaxis: Diet  Montalvo: Keep in place for critical care monitoring of fluid balance. Ancillary consults:  Neurosurgery, Neurology, Medicine and critical care  PMR. Patient/Family update:  Questions answered. Support given. Code status: Full code.       Disposition: Continue ICU/      Electronically signed by Ruel Campuzano RN MSN APRN-NP [facility] NP  KIRAS CCRN 10/29/2020 8:05 AM

## 2020-10-29 NOTE — PLAN OF CARE
Problem: Skin Integrity:  Goal: Will show no infection signs and symptoms  Description: Will show no infection signs and symptoms  Outcome: Met This Shift  Goal: Absence of new skin breakdown  Description: Absence of new skin breakdown  Outcome: Met This Shift     Problem: Falls - Risk of:  Goal: Will remain free from falls  Description: Will remain free from falls  Outcome: Met This Shift  Goal: Absence of physical injury  Description: Absence of physical injury  Outcome: Met This Shift     Problem: HEMODYNAMIC STATUS  Goal: Patient has stable vital signs and fluid balance  Outcome: Met This Shift     Problem: COMMUNICATION IMPAIRMENT  Goal: Ability to express needs and understand communication  Outcome: Met This Shift

## 2020-10-29 NOTE — PROGRESS NOTES
There is no change in patient's neurosurgical status. Spoke with family. No indication for neurosurgical intervention at this time.

## 2020-10-29 NOTE — CARE COORDINATION
Weaned off cardene drip. Spoke with wife earlier today re: rehab choice. First choice is [facility] acute rehab, 2nd choice is [place]. Referral made to [facility] at [place] and accepted. Await [facility] acceptance. Will plan [place] [facility] if no bed available at [facility] when stable for discharge. Will need ins precert and neg covid.

## 2020-10-30 NOTE — PROGRESS NOTES
Physical Therapy    Physical Therapy Treatment    Name: Raudel Levin  : 1955  MRN: [id number]    Referring Provider:  SHANIA Head CNP     Date of Service: 10/30/2020    Evaluating PT:  Deon Milligan, PT, DPT [id number]     Room #:  6608/5857-X  Diagnosis:  Acute stroke   PMHx/PSHx:  Allergic rhinitis, leukemia, gout, thyroid disease   Procedure/Surgery:  R ICA thrombectomy, R MCA thrombectomy 10/23   Precautions:  Falls, L hemiplegia, L hemineglect   Equipment Needs:  TBD     SUBJECTIVE:    Pt lives with wife in a 1 story home with 3 stairs to enter and 0 rail. Bedroom and bathroom are on the 1st level. Full flight to basement with 1 rail. Pt ambulated with no AD PTA. Pt is very active at baseline -- hikes, bikes, golfs      OBJECTIVE:   Initial Evaluation  Date: 10/27/20 Treatment  10/30/2020 Short Term/ Long Term   Goals   AM-PAC 6 Clicks 8/01 3/57    Was pt agreeable to Eval/treatment? Yes yes    Does pt have pain? No c/o pain  8/10 back pain    Bed Mobility  Rolling: Max A  Supine to sit: Max A   Sit to supine: Max A x2  Scooting: Max A to EOB  Rolling: NT  Supine to sit: Mod x2  Sit to supine: Max A x2  Scooting: Max A to EOB  Rolling: Min A  Supine to sit: Min A  Sit to supine: Min A  Scooting: Min A   Transfers Sit to stand: NT d/t hypotension at EOB  Stand to sit: NT  Stand pivot: NT Sit to stand: Max A x2, partial  Stand to sit: Max A x2, partial  Stand pivot: NT Sit to stand: Mod A  Stand to sit: Mod A  Stand pivot: Mod A with AAD   Ambulation    NT NT >5 feet with AAD Mod A   Stair negotiation: ascended and descended  NT NT NA   ROM BUE:  Per OT eval  BLE:  WFL     Strength BUE:  Per OT eval   RLE 5/5  LLE 0/5   LLE>1/5    Balance Sitting EOB:  Mod<>max A  Dynamic Standing:  NT d/t hypotension at EOB Sitting EOB:  Francois<>MaxA  Dynamic Standing:  NT Sitting EOB:  SBA  Dynamic Standing:   Mod A     Pt is A & O x 3  RASS:  0  CAM-ICU:  NT  Sensation: absent on LLE  Edema:  Unremarkable Vitals:  Blood Pressure at rest 127/82 Blood Pressure post session 107/80   Heart Rate at rest 74 bpm Heart Rate post session 88 bpm   SPO2 at rest 96%  SPO2 post session 95%        Functional Status Score-Intensive Care Unit (FSS-ICU)   Rolling 1/7   Supine to sit transfer 2/7   Unsupported sitting  2/7   Sit to stand transfers 1/7   Ambulation 0/7   Total  6/35     Therapeutic Exercises:  N/A    Patient education  Pt educated on safety during functional mobility, attending to L side, sitting balance and posture     Patient response to education:   Pt verbalized understanding Pt demonstrated skill Pt requires further education in this area   Somewhat  Somewhat  Yes      ASSESSMENT:    Comments:  Pt received supine and agreeable to PT evaluation with OT collaboration. Pt cleared for participation by RN prior to session. Vitals monitored during session. Patient required heavy assist from 2 persons to sit EOB demonstrating L lateral lean, worsened with use of RUE pushing on bed -- extensive education provided to patient about midline sitting with no correction made by patient. STS needed heavy assist from two persons with B knee blocking to complete a partial repetition - only able to hold standing x10 seconds. Pt with near total L neglect showing R head position and gaze preference - unable to get pt eyes to cross midline. Wife in room -- educated on sitting on left side to increase L attention. Patient complaining of R sided back pain during sitting which limited time EOB - pt perseverating on back pain and bed causing pain. Patient returned to supine in bed to end session -- repositioned into midline upright sitting in bed. Patient demonstrates poor attention to task and slow processing speed throughout session. Pt would benefit from intensive PT services at discharge.      Treatment:  Patient practiced and was instructed in the following treatment:     Bed mobility training - pt given verbal and tactile cues to facilitate proper sequencing and safety during rolling and supine>sit as well as provided with physical assistance to complete task    Sitting EOB for >15 minutes for upright tolerance, postural awareness and BLE ROM -- cues for balance/posture and midline orientation d/t L posterlateral lean    STS and pivot transfer training - pt educated on proper hand and foot placement, safety and sequencing, and use of proper technique to safely complete sit<>stand and pivot transfers with hands on assistance to complete task safely   Education provided to promote midline sitting    Pt's/ family goals   1. None stated     Patient and or family understand(s) diagnosis, prognosis, and plan of care. Yes     PLAN:    Current Treatment Recommendations     [x] Strengthening     [x] ROM   [x] Balance Training   [x] Endurance Training   [x] Transfer Training   [x] Gait Training   [] Stair Training   [x] Positioning   [x] Safety and Education Training   [x] Patient/Caregiver Education   [] HEP  [] Other     Frequency of treatments: 2-5x/week x 1-2 weeks.     Time in  1310  Time out  1348    Total Treatment Time  38 minutes       CPT codes:  [] Low Complexity PT evaluation 03448  [] Moderate Complexity PT evaluation 21890  [] High Complexity PT evaluation 92335  [] PT Re-evaluation 01436  [] Gait training 52305 -- minutes  [] Manual therapy 76801 -- minutes  [x] Therapeutic activities 38510 38 minutes  [] Therapeutic exercises 66449 - minutes  [] Neuromuscular reeducation 37813 -- minutes     Haris Mckeon, PT, DPT  [id number]   Santos Hickey, [facility]

## 2020-10-30 NOTE — PROGRESS NOTES
Surgical  Neuro Science Intensive Care Unit  Critical Care  Daily Progress Note 10/30/2020    Date of Admission: 10/23/2020    CC: Follow up for stroke    HOSPITAL COURSE/OVERNIGHT EVENTS:    [age]year old male with a PMH of CLL presents to the ED with c/o left sided weakness. Work up demonstrated R ICA occlusion and right MCA occlusion. Patient went for mechanical thrombectomy. Repeat CT scan did demonstrated expected reperfusion hemorrhage, patient admitted to ICU for further care. 10/24: More alert, answering questions appropriately, afebrile, VSS  10/28  Remains on Cardene infusion. Norvasc started. Tube feeding anat night. Diet durng the day. Free water added. 10/29 No issues overnight. 10/30  No issuers overnight. PHYSICAL EXAM:  /75   Pulse 71   Temp 98.4 °F (36.9 °C) (Temporal)   Resp 15   Ht 6' 2\" (1.88 m)   Wt 200 lb 1.6 oz (90.8 kg)   SpO2 95%   BMI 25.69 kg/m²     Intake/Output Summary (Last 24 hours) at 10/30/2020 0922  Last data filed at 10/30/2020 0900  Gross per 24 hour   Intake 2946 ml   Output 2225 ml   Net 721 ml     General appearance:  Comfortable. Pain Description: denies    NEUROLOGIC:   RASS Score:  0  GCS:  14  5 -Opens eyes on own   6 - Follows simple motor commands  4 - Seems confused, disoriented       Pupil size:  Left 3 mm  Right 3 mm  Pupil reaction: Yes   PERRLA  Wiggles fingers: Left   No  Right Yes  Hand grasp:   Left: No     Right    Yes  Wiggles toes: Left   No Right  Yes  Plantar flexion: Left  No  Right   Yes  Facial droop:   none   Speech:  clear    CONSTITUTIONAL: No acute distress, lying in hospital bed. CARDIOVASCULAR: S1 S2, regular rate, regular rhythm, no murmur/gallop/rub. Monitor: NSR. PULMONARY: Bilaterally clear. No rhonchi/rales/wheezes, no use of accessory muscles. Room air. RENAL: Montalvo to gravity, clear yellow urine. Fluid balance for previous 24 hours:  - 112 ml.     ABDOMEN: Soft, nontender, nondistended, nontympanic, normal bowel sounds. Diet: Dental soft. No reported nausea or vomiting. MUSCULOSKELETAL:  Moves RUE & RLE. No movement note LUE & LLE.    SKIN/EXTREMITIES: No rashes/ecchymosis, no edema/clubbing, warm/dry, good capillary refill. LINES:  Right Brachial PIC. Day 6. No palpable cord. Recent Labs     10/28/20  0436 10/29/20  0505 10/30/20  0430   WBC 14.6* 13.8* 13.9*   HGB 14.1 13.6 14.0   HCT 44.1 43.5 44.6   MCV 99.5 100.5* 100.0*    261 262       Recent Labs     10/28/20  0436  10/28/20  1430 10/29/20  0505 10/30/20  0430   *   < > 158* 153* 152*   K 3.6  --   --  3.6 3.8   CO2 26  --   --  27 27   PHOS 4.5  --   --  3.6 3.7   BUN 51*  --   --  45* 33*   CREATININE 0.7  --   --  0.8 0.8    < > = values in this interval not displayed. ASSESSMENT/PLAN:     Principal Problem:    Stroke, acute, thrombotic ([facility] [address]  Active Problems:    Acquired hypothyroidism    Diabetes mellitus type 2 in nonobese (HCC)    HLD (hyperlipidemia)    Cerebral edema (HCC)  Resolved Problems:    * No resolved hospital problems. *    Neuro:  Right MCA/BASSAM stroke due to MARIA TERESA occlusion. Petechial hemorrhage. S/p Thrombectomy. Cerebral edema. Monitor neuro status. Neurology following. Neurosurgery following. Stroke education. Stroke protocol. Speech therapy following. CV:  Hypertensive. Hx of Hypercholesterolemia    Monitor hemodynamics. BP goal systolic less than 211 mm Hg. Cardene infusing. PRN hydralzine & labetalol. Statin. ASA. Echo completed-no abnormality. Norvasc. Pulm: . At risk for respiratory insufficiency. Hx allergic rhinitis. Monitor RR & SpO2. O2 as needed. Encourage cough, SMI  & deep breathing. GI:   Dysphagia. BMI 26  Monitor bowel function. Diet:  CDental soft. Tube feeding at night. .       Dietary supplements. Zofran. Bowel regime. Renal:  No acute issues.   Hx of gout  Monitor BUN & Cr, electrolytes & replace as needed. Monitor I & O. Cholocrys. Febuoxstat. ID: Leukocytosis. Acyclovir (sustitue for home med-Valtrex). Endocrine: No acute issues. Hx of hypothyroid. Monitor BS. Levothyrroxine,    MSK:  Left weakness. Deconditioned.   ROM. Turn & reposition. PT & OT Am pac 8/24. Monitor for skin breakdown. Heme: No acute issues. Monitor CBC. Bowel regime: Glycolax  Ducolax. Pain control/Sedation: Tylenol  DVT prophylaxis: SCD  GI prophylaxis: Diet  Montalvo: Keep in place for critical care monitoring of fluid balance. Ancillary consults:  Neurosurgery, Neurology, Medicine and critical care  PMR. Patient/Family update:  Questions answered. Support given. Code status: Full code.       Disposition: Continue ICU/      Electronically signed by Jay Celis RN MSN APRN-NP [facility] NP  KIRAS CCRN 10/30/2020 9:22 AM

## 2020-10-30 NOTE — PROGRESS NOTES
Pt refusing to be transferred out of ICU. Pt's dinner arrived, will attempt transfer again after pt eats.

## 2020-10-30 NOTE — PROGRESS NOTES
Occupational Therapy  OT BEDSIDE TREATMENT NOTE      Date:10/30/2020  Patient Name: Chandrakant Sanchez  MRN: [id number]  : 1955  Room: [address] [place] [address]-A     Referring Provider: SHANIA Alicia CNP      Evaluating OT: DARIEL Cartagena/TELLY #[id number]        Modified Sera Scale   Score     Description  0             No symptoms  1             No significant disability despite symptoms  2             Slight disability; able to look after own affairs  3             Moderate disability; able to ambulate without assist/ requires assist with ADLs  4             Moderate/Severe disability;requires assist to ambulate/assist with ADLs  5             Severe disability;bedridden/incontinent   6               Score:   5         AM-PAC Daily Activity Raw Score:      Recommended Adaptive Equipment: TBD         Diagnosis: Stroke, acute, thrombotic ([facility] [address] [I63.9]  Stroke, acute, thrombotic ([facility] [address] [I63.9]  Stroke, acute, thrombotic ([facility] [address] [I63.9]     Reason for admission: L sided weakness, L sided neglect     Surgery/Procedure:  IR MECHANICAL ART THROMBECTOMY INTRACRANIAL 10/23     Pertinent Medical History: allergic rhinitis, leukemia, gout, thyroid disease        Precautions:  Falls, L hemiplegia, L neglect, NGT , L lateral lean (pusher)      Home Living: Per wife report, Pt lives wife in a 1 story house with 3 step(s) to enter and 0 rail(s); bed/bath on 1st level. Full flight to basement with 1 handrail  Bathroom setup: ?  Equipment owned: none     Prior Level of Function: Independent with ADLs; Independent with IADLs. No AD for ambulation. Driving: Yes  Occupation: likes to hike, bike, golf (very active at baseline)     Pain Level: pt reports back pain rated at 7-8/10 ;  Therapist facilitated repositioning to address pain       Cognition: A&O: x3 (oriented to self, place and month/year)  Follows 1 step commands with cuing   Impaired insight into deficits               Memory: fair              Comprehension fair-              Problem solving: poor+              Judgement/safety: poor                 Communication skills:               Vision: decreased L visual scanning; preferred right gaze         L neglect                   Glasses:readers                                                        Hearing: wfl       UE Assessment:  Hand Dominance: Right [x]? Left []?       ROM Strength STM goal: PRN   RUE  WFL AROM 4/5 5/5      LUE PROM WFL 0/5 Good tolerance to BUE exercises to increase strength for ADL participation        Impaired proprioception L side    Sensation: impaired sensation L side  Tone: impaired L side (flaccid)   Edema: none noted     Functional Assessment    Initial Eval Status  Date: 10/27/20 Treatment Status  Date: 10/30/20 STG=LTG  5-14  days    Feeding NGT  NGT                       Continue to assess as able      Grooming Max A (for thoroughness of completion of task, sequencing, safety, L sided neglect to wash hands)  Mod A    (face / hand wash; max cuing)                       Min. A   while seated in bedside chair      UB dressing Max A  Max A    Assist with sequencing, posture and management of L UE                       Min. A         LB dressing Dep Dep  Mod. A   using AE as needed for safe reach/ energy conservation     Bathing Max A  Max A                       Min. A   using AE as needed for safe reach/ energy conservation        Toileting Dep  Dep                       Mod. A      Bed Mobility  Supine to sit:  Max A x2     Sit to supine: Dep x2 (see comments below)  Mod A x2    (sit to supine with HOB elevated; severe L lateral lean, pusher)    Max A x2   (sit to supine:  Severe L lateral lean / pusher)                       Min. A  in prep of ADL tasks & transfers   Functional Transfers Sit to stand: NT     Stand to sit: NT  Max A x2    (partial sit to stand with L knee block) - max cuing on body mechanics and posture                       Min. A  sit<>stand/functional bathroom transfers using AD/DME as needed for balance and safety   Functional Mobility NT NT                       Min. A   functional/bathroom mobility using AD as needed & demonstrating good safety      Balance Sitting:     Static: Max A     Dynamic:Max A  Standing: NT  Sit: max/dep  (severe L lateral lean / [place])    Improved to min A at times when R hand was placed in lap    Cuing and assist for midline trunk and cervical positioning;  R head turn      Partial stand with L knee block (max A x2) SBA dynamic sitting balance; Min. A dynamic standing balance  during ADL tasks & transfers   Endurance/Activity Tolerance    Poor+ tolerance with light activity. Pt sat EOB for ~10 minutes to improve sitting balance/posture/seated ADLs  P+    Light activity;  limitied due Fair+  tolerance with light/moderate activity/self care routine   Visual/  Perceptual L neglect; decreased visual scanning to L sided; decreased cervical rotation to L     L neglect;  R head turn / eye gaze                           Vitals:  Blood Pressure at rest 127/82 Blood Pressure post session 107/80   Heart Rate at rest 76 Heart Rate post session 88   SPO2 at rest 96% SPO2 post session 95%          Comments: Upon arrival pt supine in bed and agreeable to OT session - wife present during session. At end of session, pt seated with HOB elevated and pillows placed for skin integrity / positioning, all lines and tubes intact, call light within reach. Treatment:  Facilitation of bed mobility (supine<>sit: repositioning in bed), sitting balance at EOB (Severe L lateral lean (pusher), addressing posture / mindline positioning. Posture did improve to at times with repositioning of R hand on lap) and partial sit to stand transfer (max A x2 with L knee block) - skilled cuing on sequencing, hand placement, posture, body mechanics, energy conservation techniques and safety. Therapist facilitated L UE PROM and skilled positioning in bed for joint and skin integrity. Therapist facilitated self-care retraining: simulated UB/LB self-care tasks and grooming tasks (face / hand wash; max cuing and hand over hand assist with L UE; incorporating bilateral midline activities) while educating pt on sequencing, modified techniques, posture, safety and energy conservation techniques. Therapist educated pt and family on L neglect, importance of positioning and strategies to address neglect, posture and positioning. Skilled monitoring of HR, O2 sats and pts response to treatment. · Pt has made fair progress towards set goals.    · Continue with current plan of care      Treatment Time In:1327            Treatment Time Out: 1400              Treatment Charges: Mins Units   Ther Ex  [id number]     Manual Therapy [phone]     Thera Activities 12087 15 1   ADL/Home Mgt [id number] [phone] 1   Neuro Re-ed 33084     Group Therapy      Orthotic manage/training  85247     Non-Billable Time     Total Timed Treatment 24 2        [facility] OTR/L #[id number]

## 2020-10-30 NOTE — PLAN OF CARE
Problem: Skin Integrity:  Goal: Will show no infection signs and symptoms  Description: Will show no infection signs and symptoms  Outcome: Met This Shift  Goal: Absence of new skin breakdown  Description: Absence of new skin breakdown  Outcome: Met This Shift     Problem: Falls - Risk of:  Goal: Will remain free from falls  Description: Will remain free from falls  Outcome: Met This Shift  Goal: Absence of physical injury  Description: Absence of physical injury  Outcome: Met This Shift     Problem: HEMODYNAMIC STATUS  Goal: Patient has stable vital signs and fluid balance  Outcome: Met This Shift     Problem: COMMUNICATION IMPAIRMENT  Goal: Ability to express needs and understand communication  Outcome: Met This Shift     Problem: ACTIVITY INTOLERANCE/IMPAIRED MOBILITY  Goal: Mobility/activity is maintained at optimum level for patient  Outcome: Not Met This Shift

## 2020-10-30 NOTE — PROGRESS NOTES
[facility]  PROGRESS NOTE  ATTENDING NOTE    CRITICAL CARE    Chief Complaint   Patient presents with    Cerebrovascular Accident     Wife last saw pt at 10pm, Pt's mom found pt this am with L side weakness       Cerebrovascular Accident       The patient is a 60 y.o. male patient of [facility] history of leukemia, gout, hypothyroid who presents with acute CVA. Patient presents emergency department by EMS with left-sided weakness yesterday. He was last seen normal previous night at 10 PM.  Was found on floor at 11 AM with left hemiplegia. Per wife patient did have left hand weakness starting about a week ago. He did not seek attention because he thought this was secondary to working too hard moving his mother. Patient was stroke team in the ED. There was telestroke consultation. patient was considered the outside TPA window. Neuro interventionalist was consulted and patient underwent mechanical thrombectomy. Pt is a poor historian. History is largely obtained from chart review and discussions with staff/family as available. He was under the care of neurology recently, but providers have changed, therefore critical care is warranted again to progress care and out of the ICU. Patient remains on cardene gtt and has been getting some nutrition PO.       Patient Active Problem List   Diagnosis    Chronic lymphocytic leukemia ([address]    Acquired hypothyroidism    Chronic idiopathic gout of left foot    Recurrent genital herpes    Diabetes mellitus type 2 in nonobese ([address]    Prediabetes    Stroke, acute, thrombotic ([address]    HLD (hyperlipidemia)    Cerebral edema (HCC)       OVERNIGHT EVENTS:  Very talkative today--thinks we were starving him and making him dehydrated    HOSPITAL COURSE:  8[age]year old male with a PMH of CLL presents to the ED with c/o left sided weakness.  Work up demonstrated R ICA occlusion and right MCA occlusion.  Patient went for mechanical thrombectomy.  Repeat CT scan did demonstrated expected reperfusion hemorrhage, patient admitted to ICU for further care. 10/24: More alert, answering questions appropriately, afebrile, VSS  10/28  Remains on Cardene infusion. no BM since admission, takes a long time to eat even a cup of applesauce  10/29:  Acyclovir restarted for chronic HSV infections  10/30:  Lactulose for constipation    /78   Pulse 75   Temp 98.1 °F (36.7 °C) (Temporal)   Resp 15   Ht 6' 2\" (1.88 m)   Wt 200 lb 1.6 oz (90.8 kg)   SpO2 93%   BMI 25.69 kg/m²   Physical Exam  Constitutional:       General: He is not in acute distress. Comments: Left hemineglect   HENT:      Head: Normocephalic and atraumatic. Nose: Nose normal.      Mouth/Throat:      Pharynx: Oropharynx is clear. Eyes:      Extraocular Movements: Extraocular movements intact. Pupils: Pupils are equal, round, and reactive to light. Neck:      Musculoskeletal: Normal range of motion and neck supple. Cardiovascular:      Rate and Rhythm: Normal rate and regular rhythm. Pulses: Normal pulses. Heart sounds: Normal heart sounds. Pulmonary:      Effort: Pulmonary effort is normal.      Breath sounds: Normal breath sounds. Abdominal:      General: There is no distension. Palpations: Abdomen is soft. Tenderness: There is no abdominal tenderness. Musculoskeletal:      Right lower leg: No edema. Left lower leg: No edema. Comments: Left side flaccid   Skin:     General: Skin is warm and dry. Neurological:      Mental Status: He is alert. Comments: Answers questions slowly but appropriately  Left hemineglect with left sided flaccidity   Psychiatric:         Mood and Affect: Mood normal.         Behavior: Behavior normal.         Thought Content: Thought content normal.         Judgment: Judgment normal.         Lines:     Montalvo:  no  Central line:  no  PICC:  yes - right brachial    CAM-ICU:  negative  RASS:  RASS 0 (Alert and Calm)    ASSESSMENT/PLAN:  1. Large right MCA stroke--ASA, echo, neurology following  2. Constipation--start bowel reg, dulcolax, add lactulose   3. Moderate malnutrition--passed for diet, will provide tube feeds at night until ensured taking adequate PO  4. Iatrogenic hypernatremia--continue to monitor, but start free water  5. Dehydration--free water  6. Leukocytosis--check UA and procalcitonin and CXR--ok and WBC improved  7.   HTN--start norvasc, c/w SBP <150; wean cardene    DVT/GI ppx--heparin, lansoprazole    Ok to transfer out of Shade Cheney MD, MSc, FACS  10/30/2020  12:45 PM

## 2020-10-30 NOTE — PROGRESS NOTES
Subjective:    No new complaints  Mentation is stable  Wife is by the bedside  Patient is eating but not much    Objective:    /82   Pulse 77   Temp 98.1 °F (36.7 °C) (Temporal)   Resp 14   Ht 6' 2\" (1.88 m)   Wt 200 lb 1.6 oz (90.8 kg)   SpO2 95%   BMI 25.69 kg/m²     24HR INTAKE/OUTPUT:      Intake/Output Summary (Last 24 hours) at 10/30/2020 1506  Last data filed at 10/30/2020 1200  Gross per 24 hour   Intake 2186 ml   Output 1775 ml   Net 411 ml       General appearance: NAD, answering questions following commands on right side flaccid on the left  Neck: FROM, supple   Lungs: Clear bilaterally no wheezes, no rhonchi, no crackles  CV: RRR, no MRGs; normal carotid upstroke and amplitude without Bruits  Abdomen: Soft, non-tender; no masses or HSM  Extremities: No edema, no cyanosis, no clubbing  Skin: Intact no rash, no lesions, no ulcers    Psych: Alert and oriented normal affect  Neuro: Left-sided hemiparesis minimal withdrawal left lower extremity to noxious stimuli  Most Recent Labs  Lab Results   Component Value Date    WBC 13.9 (H) 10/30/2020    HGB 14.0 10/30/2020    HCT 44.6 10/30/2020     10/30/2020     (H) 10/30/2020    K 3.8 10/30/2020     (H) 10/30/2020    CREATININE 0.8 10/30/2020    BUN 33 (H) 10/30/2020    CO2 27 10/30/2020    GLUCOSE 114 (H) 10/30/2020    ALT 22 10/23/2020    AST 26 10/23/2020    INR 1.0 10/23/2020    TSH 0.958 10/29/2020    LABA1C 5.3 10/25/2020    LABMICR <12.0 03/16/2015     Recent Labs     10/30/20  0430   MG 2.6     Lab Results   Component Value Date    CALCIUM 9.2 10/30/2020    PHOS 3.7 10/30/2020        XR CHEST PORTABLE   Final Result   Nonacute chest with atelectasis in the lung bases. CT HEAD WO CONTRAST   Final Result   Stable extensive right middle cerebral and anterior cerebral infarcts with   petechial hemorrhage and stable mass-effect.          MRI BRAIN WO CONTRAST   Final Result   Acute infarct right middle cerebral artery territory with edema, midline   shift and petechial hemorrhage. This demonstrates some interval progression   allowing for difference in technique. CT HEAD WO CONTRAST   Final Result   1. Encephalomalacia is seen within the right cerebral hemisphere consistent   with a subacute large right MCA and BASSAM distribution infarct. 2. Cytotoxic edema seen throughout the right cerebral hemisphere with   approximately 5 mm right to left subfalcine herniation. 3. There is no acute intracranial hemorrhage. 4.      XR ABDOMEN (KUB) (SINGLE AP VIEW)   Final Result   Enteric tube in the stomach as above. No evidence of bowel obstruction. CT HEAD WO CONTRAST   Final Result   1. Diffuse pattern of cytotoxic edema in the right cerebral hemisphere in the   distribution of the anterior and middle cerebral arteries likely correlating   to recent stroke. There is sulcal effacement and mass effect upon the right   lateral ventricle. 2-3 mm subfalcine herniation from right to left. 2. No evidence of intracranial hemorrhage. Decreased density of presumed   contrast observed on prior CT. CT HEAD WO CONTRAST   Final Result   1. Hyperdensity in the right MCA distribution likely represents contrast   staining from recent interventional procedure. 2. Mild edema in the right cerebral hemisphere resulting in minimal right to   left shift of approximately 2 mm. IR MECHANICAL ART THROMBECTOMY INTRACRANIAL   Final Result      XR CHEST PORTABLE   Final Result   Diffuse prominent reticular markings may be related to interstitial edema. No airspace consolidation. CTA NECK W CONTRAST   Final Result   1. Occluded right internal carotid artery from the posterior margin   through the right M1 and likely into segment . Occluded right   vertebral artery from the origin to the basilar artery.    2. Estimated stenosis of the proximal left internal carotid artery by   NASCET criteria is not hemodynamically significant               CT BRAIN PERFUSION   Final Result      There is a large core infarct exceeding 50% in the region of the   distribution of the middle cerebral artery with a relatively small   region of peripheral ischemia            CTA HEAD W CONTRAST   Final Result   1. Occluded right internal carotid artery from the posterior margin   through the right M1 and likely into segment . Occluded right   vertebral artery from the origin to the basilar artery. 2. Estimated stenosis of the proximal left internal carotid artery by   NASCET criteria is not hemodynamically significant               CT HEAD WO CONTRAST   Final Result   Findings suspicious significant right MCA infarct. Hyperdensity in the right   MCA suggest thrombus. Significant amount of mucosal thickening in the sinuses particularly ethmoid   air cells. Critical finding: Results were called to Dr. January Rees  on 10/23/2020 at 1250   hours           Repeat CT head reviewed cerebral edema edema with subfalcine herniation  Assessment    Principal Problem:    Stroke, acute, thrombotic ([address]  Active Problems:    Acquired hypothyroidism    Diabetes mellitus type 2 in nonobese ([address]    HLD (hyperlipidemia)    Cerebral edema ([address]  Resolved Problems:    * No resolved hospital problems.  *      Plan:  59-year-old male history of diabetes hypertension hyperlipidemia, hypothyroidism admitted with acute right MCA stroke status post mechanical thrombectomy admitted to ICU     3% saline for cerebral edema  Monitor labs closely  ASA  Nicardipine drip-  Continue to monitor closely in ICU  High intensity statin  Check fasting lipid panel-LDL 91  Check hemoglobin A1c 5.3  Neurology consult appreciated    Eating by mouth with nocturnal tube feeds  To acute rehab eventually    Electronically signed by [address] [place] [address] Tr Chapman MD on 10/30/2020 at 3:06 PM

## 2020-10-30 NOTE — PROGRESS NOTES
Hilaria Carreno is a 60 y.o. right handed male     Neurology following for stroke    PMH of hypothyroidism, GERD and leukemia    Presented to ED on 10/23/2020 with left sided weakness, left facial paralysis and left hemineglect. He was complaining of left hand weakness the week before, therefore out of tPA window. He was a thrombectomy candidate with MARIA TERESA occlusion. CTH hypodensity in RMCA suggestive of thrombus. CTH post mechanical thrombectomy hypodensity in RMCA with mild edema and right to left 2 mm shift. CTA head/neck occluded MARIA TERESA posterior margin throughout right M1, occluded right vertebral artery. CTP brain large core infarct > 50% in RMCA territory. MRI brain shows RMCA with edema, 8 mm midline shift and petechial hemorrhage. Repeat CTH 10/28 stable    Patient's wife at bedside. Feels her  is more alert today and was able to eat breakfast. She is asking if he will be going to acute rehab. I advised her that PM&R is awaiting therapy evals to determine appropriateness. She has no additional questions for neurology at this time. No chest pain or palpitations  No coughing or wheezing    No itching or bruising appreciated  + left side flaccid     ROS otherwise negative      Objective:     /81   Pulse 75   Temp 98.4 °F (36.9 °C) (Temporal)   Resp 14   Ht 6' 2\" (1.88 m)   Wt 200 lb 1.6 oz (90.8 kg)   SpO2 94%   BMI 25.69 kg/m²     General appearance: lethargic, appears stated age, cooperative and no distress  Head: normocephalic, without obvious abnormality, atraumatic  Eyes: conjunctivae/corneas clear  Neck: symmetrical, trachea midline   Lungs: respirations unlabored   Heart: sinus tach on monitor  Abdomen: soft, non-tender  Extremities:  normal, atraumatic, no cyanosis, left hand 1+ edema no pitting   Pulses: 2+ and symmetric  Skin: color, texture, turgor normal---no rashes or lesions      Mental Status: Alert and oriented to self, time and place.   Follows commands on the right.      Fair attention/concentration    Speech: dysarthric   Language: no aphasia    Cranial Nerves:  I: smell    II: visual acuity     II: visual fields L visuospatial neglect    II: pupils JEAN-PAUL   III,VII: ptosis None   III,IV,VI: extraocular muscles  Right gaze preference, does not cross midline    V: mastication Normal   V: facial light touch sensation  Decreased on left   V,VII: corneal reflex     VII: facial muscle function - upper  Normal   VII: facial muscle function - lower Left 7th [facility] paresis    VIII: hearing Normal   IX: soft palate elevation  Normal   IX,X: gag reflex Present   XI: trapezius strength  5/5 right, 0/5 left    XI: sternocleidomastoid strength 5/5 right, 0/5 left   XI: neck extension strength  5/5 right, 0/5 left    XII: tongue strength  Deviated left      Motor:  5/5 strength on right side  L hemiplegia   Normal bulk and tone  No abnormal movements    Sensory:  No response to noxious stimuli on L     L hemispatial neglect     Reflexes:   RUE 2+, LUE BE  BLE 2+    No Babinskis    Laboratory/Radiology:     CBC with Differential:    Lab Results   Component Value Date    WBC 13.9 10/30/2020    RBC 4.46 10/30/2020    HGB 14.0 10/30/2020    HCT 44.6 10/30/2020     10/30/2020    .0 10/30/2020    MCH 31.4 10/30/2020    MCHC 31.4 10/30/2020    RDW 12.4 10/30/2020    SEGSPCT 21 04/27/2012    LYMPHOPCT 15.8 10/23/2020    MONOPCT 6.2 10/23/2020    BASOPCT 0.9 10/23/2020    MONOSABS 0.62 10/23/2020    LYMPHSABS 1.59 10/23/2020    EOSABS 0.33 10/23/2020    BASOSABS 0.09 10/23/2020     CMP:    Lab Results   Component Value Date     10/30/2020    K 3.8 10/30/2020     10/30/2020    CO2 27 10/30/2020    BUN 33 10/30/2020    CREATININE 0.8 10/30/2020    GFRAA >60 10/30/2020    LABGLOM >60 10/30/2020    GLUCOSE 114 10/30/2020    GLUCOSE 112 04/27/2012    PROT 6.9 10/23/2020    LABALBU 4.3 10/23/2020    LABALBU 4.8 04/27/2012    CALCIUM 9.2 10/30/2020    BILITOT 0.5 10/23/2020 ALKPHOS 79 10/23/2020    AST 26 10/23/2020    ALT 22 10/23/2020     Hepatic Function Panel:    Lab Results   Component Value Date    ALKPHOS 79 10/23/2020    ALT 22 10/23/2020    AST 26 10/23/2020    PROT 6.9 10/23/2020    BILITOT 0.5 10/23/2020    LABALBU 4.3 10/23/2020    LABALBU 4.8 04/27/2012     HgBA1c:    Lab Results   Component Value Date    LABA1C 5.3 10/25/2020     FLP:    Lab Results   Component Value Date    TRIG 101 10/25/2020    HDL 48 10/25/2020    LDLCALC 91 10/25/2020    LABVLDL 20 10/25/2020     CTH: Findings suspicious significant right MCA infarct.  Hyperdensity in the right   MCA suggest thrombus. Significant amount of mucosal thickening in the sinuses particularly ethmoid air cells. IR mechanical art thrombectomy: RIGHT middle cerebral artery occlusion and right internal carotid artery occlusion status post successful mechanical thrombectomy. with   TICI 3 recanalization     Repeat CTH 10/25/20: 1. Encephalomalacia is seen within the right cerebral hemisphere consistent with a subacute large right MCA and BASSAM distribution infarct. 2. Cytotoxic edema seen throughout the right cerebral hemisphere with   approximately 5 mm right to left subfalcine herniation. 3. There is no acute intracranial hemorrhage. CTA head/neck: 1.  Occluded right internal carotid artery from the posterior margin   through the right M1 and likely into segment . Occluded right   vertebral artery from the origin to the basilar artery.    2. Estimated stenosis of the proximal left internal carotid artery by   NASCET criteria is not hemodynamically significant     CTP brain: There is a large core infarct exceeding 50% in the region of the   distribution of the middle cerebral artery with a relatively small   region of peripheral ischemia     MRI brain: Acute infarct right middle cerebral artery territory with edema, midline   shift and petechial hemorrhage.  This demonstrates some interval progression   allowing for difference in technique. Repeat CTh:Stable extensive right middle cerebral and anterior cerebral infarcts with   petechial hemorrhage and stable mass-effect. (On my read appears to be slightly worse)    Echo:Summary   Technically limited study with inadequate parasternal window. Left ventricle is normal in size . Mild left ventricular concentric hypertrophy noted. Hyperdynamic left ventricular systolic function. Midcavity gradient. Mild mitral annular calcification. Structurally normal aortic valve.     All labs and imaging studies reviewed independently    Assessment:     Right MCA/BASSAM stroke due to MARIA TERESA occlusion, atheroembolic, artery to artery s/p thrombectomy with TICI 3 recanalization     Plan:     Continue aspirin and statin     Stroke education    PT/OT/speech    Okay to d/c to rehab from neurology POV    Follow up OP neuro     Neuro will sign off, please call with any questions or new issues       Louisa Chowdary PA-C  11:55 AM  10/30/2020

## 2020-10-30 NOTE — PROGRESS NOTES
Improving level of consciousness every day  There is no change in patient's neurosurgical status. Spoke with family. No indication for neurosurgical intervention at this time.

## 2020-10-31 NOTE — PLAN OF CARE
Problem: Skin Integrity:  Goal: Will show no infection signs and symptoms  Description: Will show no infection signs and symptoms  Outcome: Met This Shift  Goal: Absence of new skin breakdown  Description: Absence of new skin breakdown  Outcome: Met This Shift     Problem: Falls - Risk of:  Goal: Will remain free from falls  Description: Will remain free from falls  Outcome: Met This Shift  Goal: Absence of physical injury  Description: Absence of physical injury  Outcome: Met This Shift     Problem: HEMODYNAMIC STATUS  Goal: Patient has stable vital signs and fluid balance  Outcome: Met This Shift     Problem: ACTIVITY INTOLERANCE/IMPAIRED MOBILITY  Goal: Mobility/activity is maintained at optimum level for patient  Outcome: Met This Shift     Problem: COMMUNICATION IMPAIRMENT  Goal: Ability to express needs and understand communication  Outcome: Met This Shift

## 2020-10-31 NOTE — PROGRESS NOTES
Subjective:  Patient seen with wife at bedside  He has a dense left-sided hemiparesis  He has a is a gastric tube in but appears to be swallowing satisfactory  Was observed drinking liquids without difficulty    No new complaints  Mentation is stable but he is quite aggravated  Wife is by the bedside  Patient is eating but not much    Objective:    /80   Pulse 74   Temp 99.1 °F (37.3 °C) (Axillary)   Resp 16   Ht 6' 2\" (1.88 m)   Wt 207 lb 14.4 oz (94.3 kg)   SpO2 98%   BMI 26.69 kg/m²     24HR INTAKE/OUTPUT:      Intake/Output Summary (Last 24 hours) at 10/31/2020 1046  Last data filed at 10/31/2020 0630  Gross per 24 hour   Intake 642 ml   Output 1015 ml   Net -373 ml       General appearance: NAD, answering questions following commands on right side flaccid on the left  Neck: FROM, supple   Lungs: Clear bilaterally no wheezes, no rhonchi, no crackles  CV: RRR, no MRGs; normal carotid upstroke and amplitude without Bruits  Abdomen: Soft, non-tender; no masses or HSM  Extremities: No edema, no cyanosis, no clubbing  Skin: Intact no rash, no lesions, no ulcers    Psych: Alert and oriented normal affect  Neuro: Left-sided hemiparesis minimal withdrawal left lower extremity to noxious stimuli  Most Recent Labs  Lab Results   Component Value Date    WBC 13.2 (H) 10/31/2020    HGB 13.5 10/31/2020    HCT 41.9 10/31/2020     10/31/2020     (H) 10/31/2020    K 3.9 10/31/2020     (H) 10/31/2020    CREATININE 0.8 10/31/2020    BUN 37 (H) 10/31/2020    CO2 26 10/31/2020    GLUCOSE 131 (H) 10/31/2020    ALT 22 10/23/2020    AST 26 10/23/2020    INR 1.0 10/23/2020    TSH 0.958 10/29/2020    LABA1C 5.3 10/25/2020    LABMICR <12.0 03/16/2015     Recent Labs     10/31/20  0630   MG 2.3     Lab Results   Component Value Date    CALCIUM 9.2 10/31/2020    PHOS 3.9 10/31/2020        XR CHEST PORTABLE   Final Result   Nonacute chest with atelectasis in the lung bases.          CT HEAD WO CONTRAST   Final the right M1 and likely into segment . Occluded right   vertebral artery from the origin to the basilar artery. 2. Estimated stenosis of the proximal left internal carotid artery by   NASCET criteria is not hemodynamically significant               CT BRAIN PERFUSION   Final Result      There is a large core infarct exceeding 50% in the region of the   distribution of the middle cerebral artery with a relatively small   region of peripheral ischemia            CTA HEAD W CONTRAST   Final Result   1. Occluded right internal carotid artery from the posterior margin   through the right M1 and likely into segment . Occluded right   vertebral artery from the origin to the basilar artery. 2. Estimated stenosis of the proximal left internal carotid artery by   NASCET criteria is not hemodynamically significant               CT HEAD WO CONTRAST   Final Result   Findings suspicious significant right MCA infarct. Hyperdensity in the right   MCA suggest thrombus. Significant amount of mucosal thickening in the sinuses particularly ethmoid   air cells. Critical finding: Results were called to Dr. Venecia Cabrales  on 10/23/2020 at 1250   hours           Repeat CT head reviewed cerebral edema edema with subfalcine herniation  Assessment    Principal Problem:    Stroke, acute, thrombotic ([address]  Active Problems:    Acquired hypothyroidism    Diabetes mellitus type 2 in nonobese ([address]    HLD (hyperlipidemia)    Cerebral edema ([address]  Resolved Problems:    * No resolved hospital problems.  *      Plan:  71-year-old male history of diabetes hypertension hyperlipidemia, hypothyroidism admitted with acute right MCA stroke status post mechanical thrombectomy   Aggressive blood pressure control  PPI for PUD prophylaxis  High intensity statin  Check fasting lipid panel-LDL 91  Check hemoglobin A1c 5.3  Neurology consult appreciated  Eating by mouth with nocturnal tube feeds  To acute rehab when accepted    Electronically signed by Caldreon Lea MD on 10/31/2020 at 10:46 AM

## 2020-11-01 NOTE — PROGRESS NOTES
Result   Stable extensive right middle cerebral and anterior cerebral infarcts with   petechial hemorrhage and stable mass-effect. MRI BRAIN WO CONTRAST   Final Result   Acute infarct right middle cerebral artery territory with edema, midline   shift and petechial hemorrhage. This demonstrates some interval progression   allowing for difference in technique. CT HEAD WO CONTRAST   Final Result   1. Encephalomalacia is seen within the right cerebral hemisphere consistent   with a subacute large right MCA and BASSAM distribution infarct. 2. Cytotoxic edema seen throughout the right cerebral hemisphere with   approximately 5 mm right to left subfalcine herniation. 3. There is no acute intracranial hemorrhage. 4.      XR ABDOMEN (KUB) (SINGLE AP VIEW)   Final Result   Enteric tube in the stomach as above. No evidence of bowel obstruction. CT HEAD WO CONTRAST   Final Result   1. Diffuse pattern of cytotoxic edema in the right cerebral hemisphere in the   distribution of the anterior and middle cerebral arteries likely correlating   to recent stroke. There is sulcal effacement and mass effect upon the right   lateral ventricle. 2-3 mm subfalcine herniation from right to left. 2. No evidence of intracranial hemorrhage. Decreased density of presumed   contrast observed on prior CT. CT HEAD WO CONTRAST   Final Result   1. Hyperdensity in the right MCA distribution likely represents contrast   staining from recent interventional procedure. 2. Mild edema in the right cerebral hemisphere resulting in minimal right to   left shift of approximately 2 mm. IR MECHANICAL ART THROMBECTOMY INTRACRANIAL   Final Result      XR CHEST PORTABLE   Final Result   Diffuse prominent reticular markings may be related to interstitial edema. No airspace consolidation. CTA NECK W CONTRAST   Final Result   1.   Occluded right internal carotid artery from the posterior margin   through the right M1 and likely into segment . Occluded right   vertebral artery from the origin to the basilar artery. 2. Estimated stenosis of the proximal left internal carotid artery by   NASCET criteria is not hemodynamically significant               CT BRAIN PERFUSION   Final Result      There is a large core infarct exceeding 50% in the region of the   distribution of the middle cerebral artery with a relatively small   region of peripheral ischemia            CTA HEAD W CONTRAST   Final Result   1. Occluded right internal carotid artery from the posterior margin   through the right M1 and likely into segment . Occluded right   vertebral artery from the origin to the basilar artery. 2. Estimated stenosis of the proximal left internal carotid artery by   NASCET criteria is not hemodynamically significant               CT HEAD WO CONTRAST   Final Result   Findings suspicious significant right MCA infarct. Hyperdensity in the right   MCA suggest thrombus. Significant amount of mucosal thickening in the sinuses particularly ethmoid   air cells. Critical finding: Results were called to Dr. Alfred Santiago  on 10/23/2020 at 1250   hours           Repeat CT head reviewed cerebral edema edema with subfalcine herniation  Assessment    Principal Problem:    Stroke, acute, thrombotic ([address]  Active Problems:    Acquired hypothyroidism    Diabetes mellitus type 2 in nonobese ([address]    HLD (hyperlipidemia)    Cerebral edema ([address]  Resolved Problems:    * No resolved hospital problems.  *      Plan:  56-year-old male history of diabetes hypertension hyperlipidemia, hypothyroidism admitted with acute right MCA stroke status post mechanical thrombectomy   Aggressive blood pressure control  PPI for PUD prophylaxis  High intensity statin  Check fasting lipid panel-LDL 91  Check hemoglobin A1c 5.3  Neurology consult appreciated  Eating by mouth with nocturnal tube feeds  To acute rehab when accepted    Electronically signed by Molly Espinoza MD on 11/1/2020 at 9:08 AM

## 2020-11-02 NOTE — PROGRESS NOTES
OT BEDSIDE TREATMENT NOTE      Date:2020  Patient Name: Dia Newman  MRN: [id number]  : 1955  Room: [address] [place] [address]A     Per OT Eval:      Referring SHANIA Pride CNP      Evaluating OT: Ab Barreto OTR/L #[id number]        Modified [place] Scale   Score     Description  0             No symptoms  1             No significant disability despite symptoms  2             Slight disability; able to look after own affairs  3             Moderate disability; able to ambulate without assist/ requires assist with ADLs  4             Moderate/Severe disability;requires assist to ambulate/assist with ADLs  5             Severe disability;bedridden/incontinent   6               Score:   5         AM-PAC Daily Activity Raw Score:      Recommended Adaptive Equipment: TBD         Diagnosis: Stroke, acute, thrombotic ([facility] [I63.9]  Stroke, acute, thrombotic ([facility] [I63.9]  Stroke, acute, thrombotic ([facility] [I63.9]     Reason for admission: L sided weakness, L sided neglect     Surgery/Procedure:  IR MECHANICAL ART THROMBECTOMY INTRACRANIAL 10/23     Pertinent Medical History: allergic rhinitis, leukemia, gout, thyroid disease        Precautions:  Falls, L hemiplegia, L neglect, NGT , L lateral lean (pusher), TAPS system       Home Living: Per wife report, Pt lives wife in a 1 story house with 3 step(s) to enter and 0 rail(s); bed/bath on 1st level. Full flight to basement with 1 handrail  Bathroom setup: ?  Equipment owned: none     Prior Level of Function: Independent with ADLs;  Independent with IADLs.   No AD for ambulation. Driving: Yes    Occupation: likes to hike, bike, golf (very active at baseline)     Pain Level: pt reports back pain rated at 7-8/10 ;  Therapist facilitated repositioning to address pain      Cognition: A&O: x3 (oriented to self, place and month/year)  Follows 1 step commands with cuing             Impaired insight into deficits               Memory: fair              Comprehension fair-              Problem solving: poor+              Judgement/safety: poor                 Communication skills:               Vision: decreased L visual scanning; preferred right gaze                   L neglect                   Glasses:readers                                                        Hearing: wfl        UE Assessment:  Hand Dominance: Right [x]? ?  Left []? ?       ROM Strength STM goal: PRN   RUE  WFL AROM 4/5 5/5      LUE PROM WFL 0/5 Good tolerance to BUE exercises to increase strength for ADL participation        Impaired proprioception L side     Sensation: impaired sensation L side  Tone: impaired L side (flaccid)   Edema: none noted     Functional Assessment    Initial Eval Status  Date: 10/27/20 Treatment Status  Date: 11/2/20 STG=LTG  5-14  days    Feeding NGT  NGT    Dependent                       Continue to assess as able      Grooming Max A (for thoroughness of completion of task, sequencing, safety, L sided neglect to wash hands)  Dependent   sitting EOB with poor ability to grasp with R hand, [place] completion     Needs supportive seating when completing grooming                        Min. A   while seated in bedside chair      UB dressing Max A  Max A with simulated tasks                       Min. A         LB dressing Dep Dep with simulated tasks and to don R sock supine in bed  Mod. A   using AE as needed for safe reach/ energy conservation     Bathing Max A  Dependent with simulated tasks due to decreased ability to follow commands and  sequence                       Min. A   using AE as needed for safe reach/ energy conservation        Toileting Dep  Dependent     Incontinent of bowels and wearing a whitehead catheter                        Mod. A      Bed Mobility  Supine to sit: Max A x2     Sit to supine: Dep x2 (see comments below)  Supine to sit: Max x2      Severe L lateral lean, pusher    Sit to Supine: Max A X 2     Log Rolling:  Max A X 2 with L LE  all lines and tubes intact, call light within reach. TAPS system positioned on the patients right side.        Treatment:  Facilitation of bed mobility, sitting balance at EOB to address posture and mindline positioning with max assistance X 2 and max verbal cues because the patient has a severe L lateral lean. 2 X Attempt for sit to stand with max X 2 assist and skilled cuing for sequencing, hand placement, posture, body mechanics, and safety. Therapist facilitated L UE PROM and skilled positioning in bed for joint and skin integrity. Therapist facilitated self-care retraining: simulated UB/LB self-care tasks and grooming tasks for face/hand washing with [facility] assistance. The therapist facilitated weight bearing techniques through the L UE when at EOB to increase body awareness and increase muscle tone. Therapist educated the patient on functional limitations and his current situation relating to his deficits. Skilled monitoring of HR, O2 sats and pts response to treatment. The patient demonstrated decreased sensation awareness of the L UE and LE. Therapist facilitated visual tracking tasks when the patient was in supine in bed and sitting EOB. · Pt has made poor progress towards set goals.    · Continue with current plan of care  · Focus on increasing body awareness and of the L extremity  · Static sitting balance   · Weight bearing through the L extremity   · Facilitated L side awareness   · Facilitated visual tracking tasks      Treatment Time In:1 :40            Treatment Time Out: 2:11             Treatment Charges: Mins Units   Ther Ex  [id number]     Manual Therapy 14734     Thera Activities 60820 23 2   ADL/Home Mgt 73627 8 1   Neuro Re-ed 34655     Group Therapy      Orthotic manage/training  55369     Non-Billable Time     Total Timed Treatment 31 1415 Dallin Vidal OTR/L  License #  [id number]

## 2020-11-02 NOTE — PLAN OF CARE
Problem: Skin Integrity:  Goal: Will show no infection signs and symptoms  Description: Will show no infection signs and symptoms  11/2/2020 1109 by Rachel Alanis RN  Outcome: Met This Shift     Problem: Skin Integrity:  Goal: Absence of new skin breakdown  Description: Absence of new skin breakdown  11/2/2020 1109 by Rachel Alanis RN  Outcome: Met This Shift     Problem: Falls - Risk of:  Goal: Will remain free from falls  Description: Will remain free from falls  11/2/2020 1109 by Rachel Alanis RN  Outcome: Met This Shift     Problem: Falls - Risk of:  Goal: Absence of physical injury  Description: Absence of physical injury  11/2/2020 1109 by Rachel Alanis RN  Outcome: Met This Shift     Problem: HEMODYNAMIC STATUS  Goal: Patient has stable vital signs and fluid balance  11/2/2020 1109 by Rachel Alanis RN  Outcome: Met This Shift     Problem: COMMUNICATION IMPAIRMENT  Goal: Ability to express needs and understand communication  11/2/2020 1109 by Rachel Alanis RN  Outcome: Met This Shift     Problem: ACTIVITY INTOLERANCE/IMPAIRED MOBILITY  Goal: Mobility/activity is maintained at optimum level for patient  11/2/2020 1109 by Rachel Alanis RN  Outcome: Ongoing

## 2020-11-02 NOTE — PROGRESS NOTES
Physical Therapy  Facility/Department: [facility]  Daily Treatment Note  NAME: Singh Gallegos  : 1955  MRN: [id number]    Date of Service: 2020  Referring Provider:  SHANIA Ibarra - CNP     Evaluating PT:  Leticia Clark PT, DPT [id number]     Room #:  4584/3295-V  Diagnosis:  Acute stroke   PMHx/PSHx:  Allergic rhinitis, leukemia, gout, thyroid disease   Procedure/Surgery:  R ICA thrombectomy, R MCA thrombectomy 10/23   Precautions:  Falls, L Hemiplegia, L Neglect, L Hemiparasthesia, TAPS, NGT  Equipment Needs:  TBD     SUBJECTIVE:     Pt lives with wife in a 1 story home with 3 stairs to enter and 0 rail. Bedroom and bathroom are on the 1st level. Full flight to basement with 1 rail. Pt ambulated with no AD PTA. Pt is very active at baseline -- hikes, bikes, golfs        OBJECTIVE:    Initial Evaluation  Date: 10/27/20 Treatment  2020 Short Term/ Long Term   Goals   AM-PAC 6 Clicks      Was pt agreeable to Eval/treatment? Yes Yes     Does pt have pain? No c/o pain  Severe low back pain     Bed Mobility  Rolling: Max A  Supine to sit: MaxA   Sit to supine: Max A x2  Scooting: Max A to EOB  Rolling: MaxA  Supine to sit: ModA x 2  Sit to supine: MaxA x 2  Scooting: MaxA Rolling: Min A  Supine to sit: Min A  Sit to supine: Min A  Scooting: Min A   Transfers Sit to stand: NT d/t hypotension at EOB  Stand to sit: NT  Stand pivot: NT Sit to stand: MaxA x2 (75% stance  x2 reps) with L knee block  Stand to sit: MaxA x2  Stand pivot: NT Sit to stand: Mod A  Stand to sit: Mod A  Stand pivot: Mod A with AAD   Ambulation    NT NT >5 feet with AAD Mod A   Stair negotiation: ascended and descended  NT NT NA   ROM BUE:  Per OT eval  BLE:  WFL       Strength BUE:  Per OT eval   RLE 5/5  LLE 0/5    LLE>1/5    Balance Sitting EOB:  Mod<>max A  Dynamic Standing:  NT d/t hypotension at EOB Sitting EOB:  MaxA  Dynamic Standing:  MaxA x 2 L Knee block Sitting EOB:  SBA  Dynamic Standing:   Mod A    Pt is A & O x 3  Sensation: absent on LLE  Edema:  Unremarkable    Patient education  Pt educated on role of PT and proper positioning and sequencing during bed mobility and functional transfers. Patient response to education:   Pt verbalized understanding Pt demonstrated skill Pt requires further education in this area   X X X     ASSESSMENT:    Comments:  Pt was received in supine agreeable to PT treatment. Displayed hypotonicity, requiring assistance during all aspects of functional mobility. Pt exhibits L hemineglect, temporarily corrected with verbal cueing. Required verbal cues and assistance to perform supine to sit. Tolerated sitting EOB ~15 minutes, displaying significant L trunk lean temporarily corrected following verbal cues. Able to perform two partial sit to stand transfers with assistance and L knee block. Pt noted to be incontinent of stool. Pt required assistance to perform rolling and required assist with pericare. Pt was left in supine with call light in reach. Treatment:  Patient practiced and was instructed in the following treatment:     Bed mobility- verbal cues and assistance to promote functional independence   Functional transfers- verbal cues and assistance to promote functional independence   Neuromuscular reeducation- verbal cues to correct L trunk lean and use RUE support as needed to improve sitting stability. Cues to correct intermittent RUE pushing    PLAN:      PLAN OF CARE:    Current Treatment Recommendations     [x] Strengthening     [x] ROM   [x] Balance Training   [x] Endurance Training   [x] Transfer Training   [x] Gait Training   [x] Stair Training   [x] Positioning   [x] Safety and Education Training   [x] Patient/Caregiver Education   [x] HEP  [] Other       Patient is making good progress towards established goals. Will continue with current POC.       Time in  1340  Time out  1410    Total Treatment Time  15 minutes      CPT codes:  [] Gait training 87165 0 minutes  [] Manual therapy 55269 0 minutes  [x] Therapeutic activities 93489 15 minutes  [] Therapeutic exercises 67471 0 minutes  [] Neuromuscular reeducation 08146 0 minutes     Jaye Sagastume, ABHIJIT Morin PT, DPT  [id number]

## 2020-11-02 NOTE — PROGRESS NOTES
Interventional Neurology,     Courtesy visit  Patient is doing well. Slight movement of the left side noticed. Stocking about the snow outside. Alert and attentive. Shows some sign of post stroke depression. Patient does not acknowledge this at this time. However will need to be continually monitored. Discharge MRS of 4  NIH 10  Hemineglect near completely resolved. All of the patient's questions were addressed and answered.

## 2020-11-02 NOTE — PLAN OF CARE
Problem: Skin Integrity:  Goal: Will show no infection signs and symptoms  Description: Will show no infection signs and symptoms  11/2/2020 0014 by Dillan Santiago RN  Outcome: Met This Shift  11/1/2020 1717 by Rubi Miller RN  Outcome: Met This Shift  Goal: Absence of new skin breakdown  Description: Absence of new skin breakdown  11/2/2020 0014 by Dillan Santiago RN  Outcome: Met This Shift  11/1/2020 1717 by Rubi Miller RN  Outcome: Met This Shift

## 2020-11-02 NOTE — PROGRESS NOTES
Left Hemiplegia persists. Improving level of consciousness every day  Awaiting placement to [facility] with family  There is no change in patient's neurosurgical status. Spoke with family.   No indication for neurosurgical intervention at this time

## 2020-11-02 NOTE — PROGRESS NOTES
Subjective:    Per wife he is eating better  No new complaints    Objective:    /82   Pulse 89   Temp 97.1 °F (36.2 °C) (Temporal)   Resp 16   Ht 6' 2\" (1.88 m)   Wt 207 lb (93.9 kg)   SpO2 95%   BMI 26.58 kg/m²     24HR INTAKE/OUTPUT:      Intake/Output Summary (Last 24 hours) at 11/2/2020 1516  Last data filed at 11/2/2020 1424  Gross per 24 hour   Intake 1646 ml   Output 850 ml   Net 796 ml       General appearance: NAD, answering questions following commands on right side flaccid on the left  Neck: FROM, supple   Lungs: Clear bilaterally no wheezes, no rhonchi, no crackles  CV: RRR, no MRGs; normal carotid upstroke and amplitude without Bruits  Abdomen: Soft, non-tender; no masses or HSM  Extremities: No edema, no cyanosis, no clubbing  Skin: Intact no rash, no lesions, no ulcers    Psych: Alert and oriented normal affect  Neuro: Left-sided hemiparesis minimal withdrawal left lower extremity to noxious stimuli  Most Recent Labs  Lab Results   Component Value Date    WBC 13.2 (H) 10/31/2020    HGB 13.5 10/31/2020    HCT 41.9 10/31/2020     10/31/2020     (H) 10/31/2020    K 3.9 10/31/2020     (H) 10/31/2020    CREATININE 0.8 10/31/2020    BUN 37 (H) 10/31/2020    CO2 26 10/31/2020    GLUCOSE 131 (H) 10/31/2020    ALT 22 10/23/2020    AST 26 10/23/2020    INR 1.0 10/23/2020    TSH 0.958 10/29/2020    LABA1C 5.3 10/25/2020    LABMICR <12.0 03/16/2015     Recent Labs     11/02/20  0533   MG 2.2     Lab Results   Component Value Date    CALCIUM 9.2 10/31/2020    PHOS 3.9 10/31/2020        XR CHEST PORTABLE   Final Result   Nonacute chest with atelectasis in the lung bases. CT HEAD WO CONTRAST   Final Result   Stable extensive right middle cerebral and anterior cerebral infarcts with   petechial hemorrhage and stable mass-effect.          MRI BRAIN WO CONTRAST   Final Result   Acute infarct right middle cerebral artery territory with edema, midline   shift and petechial hemorrhage. This demonstrates some interval progression   allowing for difference in technique. CT HEAD WO CONTRAST   Final Result   1. Encephalomalacia is seen within the right cerebral hemisphere consistent   with a subacute large right MCA and BASSAM distribution infarct. 2. Cytotoxic edema seen throughout the right cerebral hemisphere with   approximately 5 mm right to left subfalcine herniation. 3. There is no acute intracranial hemorrhage. 4.      XR ABDOMEN (KUB) (SINGLE AP VIEW)   Final Result   Enteric tube in the stomach as above. No evidence of bowel obstruction. CT HEAD WO CONTRAST   Final Result   1. Diffuse pattern of cytotoxic edema in the right cerebral hemisphere in the   distribution of the anterior and middle cerebral arteries likely correlating   to recent stroke. There is sulcal effacement and mass effect upon the right   lateral ventricle. 2-3 mm subfalcine herniation from right to left. 2. No evidence of intracranial hemorrhage. Decreased density of presumed   contrast observed on prior CT. CT HEAD WO CONTRAST   Final Result   1. Hyperdensity in the right MCA distribution likely represents contrast   staining from recent interventional procedure. 2. Mild edema in the right cerebral hemisphere resulting in minimal right to   left shift of approximately 2 mm. IR MECHANICAL ART THROMBECTOMY INTRACRANIAL   Final Result      XR CHEST PORTABLE   Final Result   Diffuse prominent reticular markings may be related to interstitial edema. No airspace consolidation. CTA NECK W CONTRAST   Final Result   1. Occluded right internal carotid artery from the posterior margin   through the right M1 and likely into segment . Occluded right   vertebral artery from the origin to the basilar artery.    2. Estimated stenosis of the proximal left internal carotid artery by   NASCET criteria is not hemodynamically significant               CT BRAIN PERFUSION Final Result      There is a large core infarct exceeding 50% in the region of the   distribution of the middle cerebral artery with a relatively small   region of peripheral ischemia            CTA HEAD W CONTRAST   Final Result   1. Occluded right internal carotid artery from the posterior margin   through the right M1 and likely into segment . Occluded right   vertebral artery from the origin to the basilar artery. 2. Estimated stenosis of the proximal left internal carotid artery by   NASCET criteria is not hemodynamically significant               CT HEAD WO CONTRAST   Final Result   Findings suspicious significant right MCA infarct. Hyperdensity in the right   MCA suggest thrombus. Significant amount of mucosal thickening in the sinuses particularly ethmoid   air cells. Critical finding: Results were called to Dr. Ross Roth  on 10/23/2020 at 1250   hours           Repeat CT head reviewed cerebral edema edema with subfalcine herniation  Assessment    Principal Problem:    Stroke, acute, thrombotic ([address]  Active Problems:    Acquired hypothyroidism    Diabetes mellitus type 2 in nonobese ([address]    HLD (hyperlipidemia)    Cerebral edema ([address]  Resolved Problems:    * No resolved hospital problems.  *      Plan:  71-year-old male history of diabetes hypertension hyperlipidemia, hypothyroidism admitted with acute right MCA stroke status post mechanical thrombectomy admitted to ICU       ASA  High intensity statin      Once he is eating enough possible NG tube removal  He will decide on discharge currently    Electronically signed by Hollis Cevallos MD on 11/2/2020 at 3:16 PM

## 2020-11-03 NOTE — PLAN OF CARE
Problem: Skin Integrity:  Goal: Will show no infection signs and symptoms  Description: Will show no infection signs and symptoms  11/3/2020 1056 by Alberteen Eisenmenger, RN  Outcome: Met This Shift     Problem: Skin Integrity:  Goal: Absence of new skin breakdown  Description: Absence of new skin breakdown  11/3/2020 1056 by Alberteen Eisenmenger, RN  Outcome: Met This Shift     Problem: Falls - Risk of:  Goal: Will remain free from falls  Description: Will remain free from falls  11/3/2020 1056 by Alberteen Eisenmenger, RN  Outcome: Met This Shift     Problem: Falls - Risk of:  Goal: Absence of physical injury  Description: Absence of physical injury  11/3/2020 1056 by Alberteen Eisenmenger, RN  Outcome: Met This Shift     Problem: HEMODYNAMIC STATUS  Goal: Patient has stable vital signs and fluid balance  Outcome: Met This Shift     Problem: ACTIVITY INTOLERANCE/IMPAIRED MOBILITY  Goal: Mobility/activity is maintained at optimum level for patient  Outcome: Met This Shift     Problem: COMMUNICATION IMPAIRMENT  Goal: Ability to express needs and understand communication  Outcome: Met This Shift

## 2020-11-03 NOTE — PROGRESS NOTES
OT BEDSIDE TREATMENT NOTE      Date:11/3/2020  Patient Name: Irving Mcahuca  MRN: [id number]  : 1955  Room: [address] [place] [address]-A     Per OT Eval:      Referring SHANIA Horton CNP      Evaluating OT: Evelio Michael OTR/TELLY #[id number]        Modified Sera Scale   Score     Description  0             No symptoms  1             No significant disability despite symptoms  2             Slight disability; able to look after own affairs  3             Moderate disability; able to ambulate without assist/ requires assist with ADLs  4             Moderate/Severe disability;requires assist to ambulate/assist with ADLs  5             Severe disability;bedridden/incontinent   6               Score:   5         AM-PAC Daily Activity Raw Score:      Recommended Adaptive Equipment: TBD         Diagnosis: Stroke, acute, thrombotic ([address] [I63.9]  Stroke, acute, thrombotic ([address] [I63.9]  Stroke, acute, thrombotic ([address] [I63.9]     Reason for admission: L sided weakness, L sided neglect     Surgery/Procedure:  IR MECHANICAL ART THROMBECTOMY INTRACRANIAL 10/23     Pertinent Medical History: allergic rhinitis, leukemia, gout, thyroid disease        Precautions:  Falls, L hemiplegia, L neglect, NGT , L lateral lean (pusher), TAPS system       Home Living: Per wife report, Pt lives wife in a 1 story house with 3 step(s) to enter and 0 rail(s); bed/bath on 1st level. Full flight to basement with 1 handrail  Bathroom setup: ?  Equipment owned: none     Prior Level of Function: Independent with ADLs;  Independent with IADLs.   No AD for ambulation.      Driving: Yes    Occupation: likes to hike, bike, golf (very active at baseline)     Pain Level: pt reports back pain with all movement     Cognition: A&O: x3 (oriented to self, place and month/year)  Follows 1 step commands with cuing             Impaired insight into deficits               Memory: fair              Comprehension fair-              Problem solving: poor+              Judgement/safety: poor                Functional Assessment    Initial Eval Status  Date: 10/27/20 Treatment Status  Date: 11/3/20 STG=LTG  5-14  days    Feeding NGT  Max A                       Continue to assess as able      Grooming Max A (for thoroughness of completion of task, sequencing, safety, L sided neglect to wash hands) Max A  Seated upright in bed                       Min. A   while seated in bedside chair      UB dressing Max A  Max A   To don/doff gown seated upright in bed                       Min. A         LB dressing Dep Dep  To don/doff socks seated upright in bed Mod. A   using AE as needed for safe reach/ energy conservation     Bathing Max A  Dependent  Simulated seated upright in  bed                       Min. A   using AE as needed for safe reach/ energy conservation        Toileting Dep  Dependent     Incontinent                         Mod. A      Bed Mobility  Supine to sit: Max A x2     Sit to supine: Dep x2 (see comments below)  Supine <-> sit: Max x2     Educated pt on technique to increase independence.                        Min. A  in prep of ADL tasks & transfers   Functional Transfers Sit to stand: NT     Stand to sit: NT  Max A x 2- sit<->stand  Cuing on body mechanics                       Min. A  sit<>stand/functional bathroom transfers using AD/DME as needed for balance and safety   Functional Mobility NT NT                       Min. A   functional/bathroom mobility using AD as needed & demonstrating good safety      Balance Sitting:     Static: Max A     Dynamic:Max A  Standing: NT  Sit: max A     Sit to Stand- max A x2 SBA dynamic sitting balance; Min.  A dynamic standing balance  during ADL tasks & transfers   Endurance/Activity Tolerance    Poor+ tolerance with light activity.  Pt sat EOB for ~10 minutes to improve sitting balance/posture/seated ADLs  Poor+   Fair+  tolerance with light/moderate activity/self care routine   Visual/  Perceptual L neglect; decreased visual scanning to L sided; decreased cervical rotation to L     L neglect;  R head turn / eye gaze    Educated pt and wife on techniques to increase left side awareness        Safety           Poor  Fair -          Comments: Upon arrival pt was supine in bed. Pt educated on techniques to increase independence and safety during ADL's, bed mobility, and functional transfers. At end of session, pt. Seated upright in bedand pt. Positioned with pillows supporting the L UE and Bilateral L LE  all lines and tubes intact, call light within reach. TAPS system positioned on the patients left side. · Pt has made poor progress towards set goals.    · Continue with current plan of care      Treatment Time In: 11:30            Treatment Time Out: 12:00             Treatment Charges: Mins Units   Ther Ex  [id number]     Manual Therapy [phone]     Thera Activities 07198 23 2   ADL/Home Mgt 09440     Neuro Re-ed 90630     Group Therapy      Orthotic manage/training  53909     Non-Billable Time     Total Timed Treatment 23 2     [address]

## 2020-11-03 NOTE — CARE COORDINATION
11/3, NICHOLAS heard back from Francy Quezadao on [address] [place] [address] test to be given to patient. Nurse given test.  SW to follow for any further discharge needs.   Asad See, [address] [facility]

## 2020-11-03 NOTE — CARE COORDINATION
95/3, Pushpa Guevara has been obtained on patient to go to [facility].  Transportation has been tentatively set up for 7:30pm  today with [facility] provided patient has been medically cleared for discharge. A COVID test will need completed on patient. SW awaiting to hear from Francy larson Sumner on whether a wet test can be completed on patient. Patient, patient's wife, nursing and Francy from facility have been informed. Ambulance, face sheet and envelope on soft chart. SW to follow for further discharge planning needs.     Karla Alvarez, [address] [facility]

## 2020-11-03 NOTE — PROGRESS NOTES
Nurse to nurse given to [facility]"" AT [place] . Edwar Hammer keep his picc line in .

## 2020-11-03 NOTE — CARE COORDINATION
11/3, NICHOLAS contacted [facility] on out sourcing time for . [facility] contacted SW back and CIRILO will be picking patient up at 5:30pm later today to go to [facility] from facility informed. SW to follow for further discharge planning needs.   Rodrick Nation, [address] [facility]

## 2020-11-03 NOTE — PROGRESS NOTES
Physical Therapy  Facility/Department: [address] NEURO SPINE  Daily Treatment Note  NAME: Bobby Villa  : 1955  MRN: [id number]    Date of Service: 11/3/2020   Referring Provider: SHANIA Mccollum CNP     Evaluating PT: Bee Mcgraw PT, DPT [id number]     Room #:  5210-A  Diagnosis:  Acute stroke   PMHx/PSHx:  Allergic rhinitis, leukemia, gout, thyroid disease   Procedure/Surgery:  R ICA thrombectomy, R MCA thrombectomy 10/23   Precautions:  Falls, L Hemiplegia, L Neglect, L Hemiparasthesia, TAPS, NGT, Prevalon Boot L  Equipment Needs:  TBD     SUBJECTIVE:     Pt lives with wife in a 1 story home with 3 stairs to enter and 0 rail.  Bedroom and bathroom are on the 1st level.  Full flight to basement with 1 rail.  Pt ambulated with no AD PTA.  Pt is very active at baseline -- hikes, bikes, golfs        OBJECTIVE:    Initial Evaluation  Date: 10/27/20 Treatment  11/3/2020 Short Term/ Long Term   Goals   AM-PAC 6 Clicks      Was pt agreeable to Eval/treatment? Yes Yes     Does pt have pain?  No c/o pain  Severe low back pain     Bed Mobility  Rolling: Max A  Supine to sit: MaxA   Sit to supine: Max A x2  Scooting: Max A to EOB  Rolling: MaxA  Supine to sit: ModA x 2  Sit to supine: MaxA x 2  Scooting: MaxA Rolling: Min A  Supine to sit: Min A  Sit to supine: Min A  Scooting: Min A   Transfers Sit to stand: NT d/t hypotension at EOB  Stand to sit: NT  Stand pivot: NT Sit to stand: MaxA x2  B knee block no AD  Stand to sit: MaxA x2  Stand pivot: NT Sit to stand: Mod A  Stand to sit: Mod A  Stand pivot: Mod A with AAD   Ambulation    NT NT >5 feet with AAD Mod A   Stair negotiation: ascended and descended  NT NT NA   ROM BUE:  Per OT eval  BLE:  WFL       Strength BUE:  Per OT eval   RLE 5/5  LLE 0/5    LLE>1/5    Balance Sitting EOB:  Mod<>max A  Dynamic Standing:  NT d/t hypotension at EOB Sitting EOB:  MaxA  Dynamic Standing:  MaxA x 2 B knee block no AD Sitting EOB:  SBA  Dynamic Standing:  Mod A    Pt is A & O x 3. Lethargic this session. Max cues for facilitation of mobility. Sensation: L hemiparasthesia  Edema:  Unremarkable         Therapeutic Exercises:    AAROM hip flexion/extension RLE 1 x 30  PROM Hip flexin/extension LLE 1x 30 (max cues for facilitation)  B heel cord stretch 3 x 30 second    Patient education  Pt educated on role of PT    Patient response to education:   Pt verbalized understanding Pt demonstrated skill Pt requires further education in this area   x x x     ASSESSMENT:    Comments:  Pt received in supine agreeable to PT. Pt requiring assistance of trunk and BLEs for supine to sit transfer. Pt sitting with posterolateral trunk lean to the L. Pt tolerating ~ 15 min EOB with intermittent cues to correct posture and use RUE support to stabilize. Pt performing sit to stand transfer with B knee block, achieving full upright stance ~ 10 seconds. Pt unable to ambulate or pivot at this time due to strength and balance deficits. Patient would benefit from continued skilled PT to maximize functional mobility independence. Spouse in room present for session educated to stimulate patient and bring attention to L side. Pt performed supine therapeutic exercise post mobilization to improve overall strength and endurance and facilitate independence with functional mobility. Pt positioned optimally with bed in chair position to promote improvement in respiratory function. Treatment:  Patient practiced and was instructed in the following treatment:     Bed mobility- verbal cues for positioning and sequencing to facilitate independence   Functional transfers-Verbal cues for proper positioning and sequencing to perform transfers safely with maximum independence.  Neuromuscular reeducation- verbal cues to correct posterolateral trunk lean, cues to use RUE on bed rail for support.      PLAN:      PLAN OF CARE:    Current Treatment Recommendations     [x] Strengthening     [x] ROM   [x] Balance Training   [x] Endurance Training   [x] Transfer Training   [x] Gait Training   [x] Stair Training   [x] Positioning   [x] Safety and Education Training   [x] Patient/Caregiver Education   [x] HEP  [] Other       Patient is making good progress towards established goals. Will continue with current POC.       Time in  1130  Time out  1200    Total Treatment Time  15 minutes     CPT codes:   [] Gait training 70027 0 minutes  [] Manual therapy 97817 0 minutes  [x] Therapeutic activities 75454 15 minutes  [] Therapeutic exercises 20311 0 minutes  [] Neuromuscular reeducation 22081 0 minutes    Jaquelin Howard PT, DPT  [id number]

## 2020-11-03 NOTE — PROGRESS NOTES
Subjective:    No new complaints today  Wife is by the bedside      Objective:    /80   Pulse 85   Temp 95.2 °F (35.1 °C) (Temporal)   Resp 18   Ht 6' 2\" (1.88 m)   Wt 207 lb (93.9 kg)   SpO2 95%   BMI 26.58 kg/m²     24HR INTAKE/OUTPUT:      Intake/Output Summary (Last 24 hours) at 11/3/2020 1524  Last data filed at 11/3/2020 1157  Gross per 24 hour   Intake 1898 ml   Output 1575 ml   Net 323 ml       General appearance: NAD, answering questions following commands on right side flaccid on the left  Neck: FROM, supple   Lungs: Clear bilaterally no wheezes, no rhonchi, no crackles  CV: RRR, no MRGs; normal carotid upstroke and amplitude without Bruits  Abdomen: Soft, non-tender; no masses or HSM  Extremities: No edema, no cyanosis, no clubbing  Skin: Intact no rash, no lesions, no ulcers    Psych: Alert and oriented normal affect  Neuro: Left-sided hemiparesis minimal withdrawal left lower extremity to noxious stimuli  Most Recent Labs  Lab Results   Component Value Date    WBC 13.2 (H) 10/31/2020    HGB 13.5 10/31/2020    HCT 41.9 10/31/2020     10/31/2020     (H) 10/31/2020    K 3.9 10/31/2020     (H) 10/31/2020    CREATININE 0.8 10/31/2020    BUN 37 (H) 10/31/2020    CO2 26 10/31/2020    GLUCOSE 131 (H) 10/31/2020    ALT 22 10/23/2020    AST 26 10/23/2020    INR 1.0 10/23/2020    TSH 0.958 10/29/2020    LABA1C 5.3 10/25/2020    LABMICR <12.0 03/16/2015     Recent Labs     11/03/20  0635   MG 2.2     Lab Results   Component Value Date    CALCIUM 9.2 10/31/2020    PHOS 3.9 10/31/2020        XR CHEST PORTABLE   Final Result   Nonacute chest with atelectasis in the lung bases. CT HEAD WO CONTRAST   Final Result   Stable extensive right middle cerebral and anterior cerebral infarcts with   petechial hemorrhage and stable mass-effect.          MRI BRAIN WO CONTRAST   Final Result   Acute infarct right middle cerebral artery territory with edema, midline   shift and petechial hemorrhage. This demonstrates some interval progression   allowing for difference in technique. CT HEAD WO CONTRAST   Final Result   1. Encephalomalacia is seen within the right cerebral hemisphere consistent   with a subacute large right MCA and BASSAM distribution infarct. 2. Cytotoxic edema seen throughout the right cerebral hemisphere with   approximately 5 mm right to left subfalcine herniation. 3. There is no acute intracranial hemorrhage. 4.      XR ABDOMEN (KUB) (SINGLE AP VIEW)   Final Result   Enteric tube in the stomach as above. No evidence of bowel obstruction. CT HEAD WO CONTRAST   Final Result   1. Diffuse pattern of cytotoxic edema in the right cerebral hemisphere in the   distribution of the anterior and middle cerebral arteries likely correlating   to recent stroke. There is sulcal effacement and mass effect upon the right   lateral ventricle. 2-3 mm subfalcine herniation from right to left. 2. No evidence of intracranial hemorrhage. Decreased density of presumed   contrast observed on prior CT. CT HEAD WO CONTRAST   Final Result   1. Hyperdensity in the right MCA distribution likely represents contrast   staining from recent interventional procedure. 2. Mild edema in the right cerebral hemisphere resulting in minimal right to   left shift of approximately 2 mm. IR MECHANICAL ART THROMBECTOMY INTRACRANIAL   Final Result      XR CHEST PORTABLE   Final Result   Diffuse prominent reticular markings may be related to interstitial edema. No airspace consolidation. CTA NECK W CONTRAST   Final Result   1. Occluded right internal carotid artery from the posterior margin   through the right M1 and likely into segment . Occluded right   vertebral artery from the origin to the basilar artery.    2. Estimated stenosis of the proximal left internal carotid artery by   NASCET criteria is not hemodynamically significant               CT BRAIN PERFUSION Final Result      There is a large core infarct exceeding 50% in the region of the   distribution of the middle cerebral artery with a relatively small   region of peripheral ischemia            CTA HEAD W CONTRAST   Final Result   1. Occluded right internal carotid artery from the posterior margin   through the right M1 and likely into segment . Occluded right   vertebral artery from the origin to the basilar artery. 2. Estimated stenosis of the proximal left internal carotid artery by   NASCET criteria is not hemodynamically significant               CT HEAD WO CONTRAST   Final Result   Findings suspicious significant right MCA infarct. Hyperdensity in the right   MCA suggest thrombus. Significant amount of mucosal thickening in the sinuses particularly ethmoid   air cells. Critical finding: Results were called to Dr. Howie Fulton  on 10/23/2020 at 1250   hours           Repeat CT head reviewed cerebral edema edema with subfalcine herniation  Assessment    Principal Problem:    Stroke, acute, thrombotic ([address]  Active Problems:    Acquired hypothyroidism    Diabetes mellitus type 2 in nonobese ([address]    HLD (hyperlipidemia)    Cerebral edema ([address]  Resolved Problems:    * No resolved hospital problems.  *      Plan:  80-year-old male history of diabetes hypertension hyperlipidemia, hypothyroidism admitted with acute right MCA stroke status post mechanical thrombectomy admitted to ICU       ASA  High intensity statin      Plan is for [facility] today we will discharge    Electronically signed by Naseem Lennon MD on 11/3/2020 at 3:24 PM

## 2020-11-03 NOTE — DISCHARGE INSTR - COC
Continuity of Care Form    Patient Name: Dylan Amador   :  1955  MRN:  [id number]    Admit date:  10/23/2020  Discharge date:  11-3-20***    Code Status Order: Full Code   Advance Directives:   [facility] Documentation     Date/Time Healthcare Directive Type of Healthcare Directive Copy in [facility] [address] Agent's Name Healthcare Agent's Phone Number    10/30/20 1945  No, patient does not have an advance directive for healthcare treatment -- -- -- -- --    10/24/20 0157  No, patient does not have an advance directive for healthcare treatment -- -- -- -- --          Admitting Physician:  Misty Serrano MD  PCP: Misty Serrano MD    Discharging Nurse: [facility] Unit/Room#: 5210/5210-A  Discharging Unit Phone Number: [phone]    Emergency Contact:   Extended Emergency Contact Information  Primary Emergency Contact: [facility]  Address: [address] [place] [address] Ji Dennis Leija [address] Phone: [phone]  Mobile Phone: [phone]  Relation: Spouse  Secondary Emergency Contact: Elissa Alas Phone: [phone]  Relation: Parent    Past Surgical History:  Past Surgical History:   Procedure Laterality Date    BACK SURGERY      [facility]    IR MECHANICAL ART THROMBECTOMY INTRACRANIAL  10/23/2020    IR MECHANICAL ART THROMBECTOMY INTRACRANIAL 10/23/2020 [facility]    TUMOR EXCISION      melanoma on the back       Immunization History:   Immunization History   Administered Date(s) Administered    Influenza, MDCK Quadv, with preserv IM (Flucelvax 4 yrs and older) 10/08/2017    Influenza, Quadv, IM, (6 mo and older Fluzone, Flulaval, Fluarix and 3 yrs and older Afluria) 10/25/2005    Influenza, Trivalent vaccine 6-35 months, IM 10/16/2016    Pneumococcal Polysaccharide ([id number]) 10/16/2016       Active Problems:  Patient Active Problem List   Diagnosis Code    Chronic lymphocytic leukemia (HCC) C91.10    Acquired hypothyroidism E03.9    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    Recurrent genital herpes A60.00    Diabetes mellitus type 2 in nonobese (HCC) E11.9    Prediabetes R73.03    Stroke, acute, thrombotic (HCC) I63.9    HLD (hyperlipidemia) E78.5    Cerebral edema (HCC) G93.6       Isolation/Infection:   Isolation          No Isolation        Patient Infection Status     Infection Onset Added Last Indicated Last Indicated By Review Planned Expiration Resolved Resolved By    None active    Resolved    COVID-19 Rule Out 10/29/20 10/29/20 10/29/20 COVID-19 (Ordered)   10/29/20 Rule-Out Test Resulted          Nurse Assessment:  Last Vital Signs: /80   Pulse 85   Temp 95.2 °F (35.1 °C) (Temporal)   Resp 18   Ht 6' 2\" (1.88 m)   Wt 207 lb (93.9 kg)   SpO2 95%   BMI 26.58 kg/m²     Last documented pain score (0-10 scale): Pain Level: 2  Last Weight:   Wt Readings from Last 1 Encounters:   11/02/20 207 lb (93.9 kg)     Mental Status:  {IP PT MENTAL STATUS:20030}    IV Access:  - PICC - site  R Upper Arm, insertion date: 10-23-20    Nursing Mobility/ADLs:  Walking   Dependent  Transfer  Dependent  Bathing  Dependent  Dressing  Dependent  Toileting  Dependent  Feeding  Assisted  Med Admin  Dependent  Med Delivery   whole and crushed    Wound Care Documentation and Therapy:        Elimination:  Continence:   · Bowel: No  · Bladder: No  Urinary Catheter: has a condom catheter   Colostomy/Ileostomy/Ileal Conduit: No       Date of Last BM: 11-2-20    Intake/Output Summary (Last 24 hours) at 11/3/2020 1210  Last data filed at 11/3/2020 1157  Gross per 24 hour   Intake 2138 ml   Output 1875 ml   Net 263 ml     I/O last 3 completed shifts:   In: 3866 [P.O.:1440; NG/GT:1604]  Out: 2075 [Urine:2075]    Safety Concerns:     [address] ARNOL Safety Concerns:336533232}    Impairments/Disabilities:      Paralysis - left arm and leg flaccid    Nutrition Therapy:  Current Nutrition Therapy:   - Oral Diet:  Dental Soft  - Tube Feedings:  { ARNOL Tube Feedings:569760714}    Routes of Feeding: corepack  Liquids: No Restrictions  Daily Fluid Restriction: {P DME Yes amt example:961037263}  Last Modified Barium Swallow with Video (Video Swallowing Test): {Done Not Done ZOIT:765114869}    Treatments at the Time of Hospital Discharge:   Respiratory Treatments: none  Oxygen Therapy:  {Therapy; copd oxygen:05122}  Ventilator:    { CC Vent NNKO:253663481}    Rehab Therapies: {THERAPEUTIC INTERVENTION:6974113455}  Weight Bearing Status/Restrictions: { CC Weight Bearin}  Other Medical Equipment (for information only, NOT a DME order):  {EQUIPMENT:475765392}  Other Treatments: none    Patient's personal belongings (please select all that are sent with patient):  {[facility] DME Belongings:716840008}    RN SIGNATURE:  Electronically signed by Funmi Montilla RN on 11/3/20 at 12:18 PM EST    CASE MANAGEMENT/SOCIAL WORK SECTION    Inpatient Status Date: ***    Readmission Risk Assessment Score:  Readmission Risk              Risk of Unplanned Readmission:        10           Discharging to Facility/ Agency   · Name:   · Address:  · Phone:  · Fax:    Dialysis Facility (if applicable)   · Name:  · Address:  · Dialysis Schedule:  · Phone:  · Fax:    / signature: {Esignature:553782016}    PHYSICIAN SECTION    Prognosis: {Prognosis:0623667774}    Condition at Discharge: [facility] Patient Condition:657314784}    Rehab Potential (if transferring to Rehab): {Prognosis:8187653093}    Recommended Labs or Other Treatments After Discharge: ***    Physician Certification: I certify the above information and transfer of Sarah Gordon  is necessary for the continuing treatment of the diagnosis listed and that he requires {Admit to Appropriate Level of Care:53145} for {GREATER/LESS:802920163} 30 days.      Update Admission H&P: {CHP DME Changes in ELKKJ:205185252}    PHYSICIAN SIGNATURE:  {Esignature:276065171}

## 2020-11-22 NOTE — DISCHARGE SUMMARY
Physician Discharge Summary     Patient ID:  Henna Esteves  [id number]  29 y.o.  1955    Admit date: 10/23/2020    Discharge date and time: 11/3/2020  6:13 PM     Admission Diagnoses: Principal Problem:    Stroke, acute, thrombotic ([address]  Active Problems:    Acquired hypothyroidism    Diabetes mellitus type 2 in nonobese ([address]    HLD (hyperlipidemia)    Cerebral edema ([address]  Resolved Problems:    * No resolved hospital problems. *      Discharge Diagnoses: Principal Problem:    Stroke, acute, thrombotic ([address]  Active Problems:    Acquired hypothyroidism    Diabetes mellitus type 2 in nonobese (HCC)    HLD (hyperlipidemia)    Cerebral edema (HCC)  Resolved Problems:    * No resolved hospital problems. *      Condition at discharge : Stable    Consults: IP CONSULT TO INTERNAL MEDICINE  IP CONSULT TO PALLIATIVE CARE  IP CONSULT TO NEUROSURGERY  IP CONSULT TO NEUROSURGERY  IP CONSULT TO IV TEAM  IP CONSULT TO PHYSICAL MEDICINE REHAB  IP CONSULT TO NEUROSURGERY    Procedures: Thrombectomy for acute right MCA stroke    Hospital Course: 78-year-old gentleman presents to the emergency room because of left-sided weakness. He was evaluated by neuro interventional list and he underwent mechanical thrombectomy. This was an off label procedure that was requested by family. Patient was admitted to surgical intensive care post procedure. He was given 3% saline for intracranial edema. Nicardipine drip was also given. His mentation progressively improved. Physical medicine and rehab was consulted. Patient was then eventually discharged to [facility] rehab. XR CHEST PORTABLE   Final Result   Nonacute chest with atelectasis in the lung bases. CT HEAD WO CONTRAST   Final Result   Stable extensive right middle cerebral and anterior cerebral infarcts with   petechial hemorrhage and stable mass-effect.          MRI BRAIN WO CONTRAST   Final Result   Acute infarct right middle cerebral artery territory with edema, midline   shift and petechial hemorrhage. This demonstrates some interval progression   allowing for difference in technique. CT HEAD WO CONTRAST   Final Result   1. Encephalomalacia is seen within the right cerebral hemisphere consistent   with a subacute large right MCA and BASSAM distribution infarct. 2. Cytotoxic edema seen throughout the right cerebral hemisphere with   approximately 5 mm right to left subfalcine herniation. 3. There is no acute intracranial hemorrhage. 4.      XR ABDOMEN (KUB) (SINGLE AP VIEW)   Final Result   Enteric tube in the stomach as above. No evidence of bowel obstruction. CT HEAD WO CONTRAST   Final Result   1. Diffuse pattern of cytotoxic edema in the right cerebral hemisphere in the   distribution of the anterior and middle cerebral arteries likely correlating   to recent stroke. There is sulcal effacement and mass effect upon the right   lateral ventricle. 2-3 mm subfalcine herniation from right to left. 2. No evidence of intracranial hemorrhage. Decreased density of presumed   contrast observed on prior CT. CT HEAD WO CONTRAST   Final Result   1. Hyperdensity in the right MCA distribution likely represents contrast   staining from recent interventional procedure. 2. Mild edema in the right cerebral hemisphere resulting in minimal right to   left shift of approximately 2 mm. IR MECHANICAL ART THROMBECTOMY INTRACRANIAL   Final Result      XR CHEST PORTABLE   Final Result   Diffuse prominent reticular markings may be related to interstitial edema. No airspace consolidation. CTA NECK W CONTRAST   Final Result   1. Occluded right internal carotid artery from the posterior margin   through the right M1 and likely into segment . Occluded right   vertebral artery from the origin to the basilar artery.    2. Estimated stenosis of the proximal left internal carotid artery by   NASCET criteria is not hemodynamically significant CT BRAIN PERFUSION   Final Result      There is a large core infarct exceeding 50% in the region of the   distribution of the middle cerebral artery with a relatively small   region of peripheral ischemia            CTA HEAD W CONTRAST   Final Result   1. Occluded right internal carotid artery from the posterior margin   through the right M1 and likely into segment . Occluded right   vertebral artery from the origin to the basilar artery. 2. Estimated stenosis of the proximal left internal carotid artery by   NASCET criteria is not hemodynamically significant               CT HEAD WO CONTRAST   Final Result   Findings suspicious significant right MCA infarct. Hyperdensity in the right   MCA suggest thrombus. Significant amount of mucosal thickening in the sinuses particularly ethmoid   air cells. Critical finding: Results were called to Dr. Rojo   on 10/23/2020 at 1250   hours             No results found for this or any previous visit (from the past 336 hour(s)). Discharge Exam:  See progress note from today    Disposition: Acute rehab    Patient Instructions:   Discharge Medication List as of 11/3/2020  5:03 PM      CONTINUE these medications which have NOT CHANGED    Details   colchicine (COLCRYS) 0.6 MG tablet Take 0.6 mg by mouth dailyHistorical Med      Febuxostat 80 MG TABS Take 80 mg by mouth dailyHistorical Med      levothyroxine (SYNTHROID) 50 MCG tablet Take 50 mcg by mouth DailyHistorical Med      valACYclovir (VALTREX) 1 g tablet Take 1,000 mg by mouth dailyHistorical Med      ibuprofen (ADVIL;MOTRIN) 800 MG tablet Take 1 tablet by mouth every 6 hours as needed for Pain, Disp-120 tablet,R-3Normal             Activity: As tolerated. Diet: Diabetic    Follow-up with No follow-up provider specified.       Note that over 30 minutes was spent in preparing discharge papers, discussing discharge with patient, medication review, etc.    Signed:  Rosemary Zabala Nikhil  11/21/2020  7:41 PM

## 2020-11-27 NOTE — ED NOTES
Bed:  HA  Expected date:   Expected time:   Means of arrival:   Comments:  ems     Nikolas Puentes RN  11/26/20 0576

## 2020-11-27 NOTE — ED PROVIDER NOTES
HPI:  11/26/20,   Time: 11:48 PM NOELLE Luis is a 60 y.o. male presenting to the ED for history of NG tube malfunction/dobhoff, beginning hours ago. The complaint has been persistent, mild in severity, and worsened by nothing. Patient has tubing for feeding. Patient is scheduled to have PEG tube placement. Patient was sent here because of dobhoff clogged. Patient also has one of the On the end is broken off. Patient reporting no chest pain no difficulty breathing no abdominal pain or vomiting. Patient has a history of stroke. ROS:   Pertinent positives and negatives are stated within HPI, all other systems reviewed and are negative.  --------------------------------------------- PAST HISTORY ---------------------------------------------  Past Medical History:  has a past medical history of Allergic rhinitis, Cancer ([facility] [address] Gout, and Thyroid disease. Past Surgical History:  has a past surgical history that includes back surgery; tumor excision; and IR MECHANICAL ART THROMBECTOMY INTRACRANIAL (10/23/2020). Social History:  reports that he has never smoked. He has never used smokeless tobacco. He reports that he does not drink alcohol or use drugs. Family History: family history includes Breast Cancer in his mother; Coronary Art Dis in his father; Mult Sclerosis in his sister. The patients home medications have been reviewed. Allergies: Bactrim [sulfamethoxazole-trimethoprim]    -------------------------------------------------- RESULTS -------------------------------------------------  All laboratory and radiology results have been personally reviewed by myself   LABS:  No results found for this visit on 11/26/20. RADIOLOGY:  Interpreted by Radiologist.  XR ABDOMEN FOR NG/OG/NE TUBE PLACEMENT   Final Result   NG tube in the stomach with the distal tip at the level of the gastric fundus.       XR ABDOMEN FOR NG/OG/NE TUBE PLACEMENT   Final Result   Nasogastric tube terminates slightly past GE junction. Suggest advancement   further into the stomach.          ------------------------- NURSING NOTES AND VITALS REVIEWED ---------------------------   The nursing notes within the ED encounter and vital signs as below have been reviewed. /75   Pulse 90   Temp 97.7 °F (36.5 °C)   Resp 16   Ht 6' 2\" (1.88 m)   Wt 207 lb (93.9 kg)   SpO2 95%   BMI 26.58 kg/m²   Oxygen Saturation Interpretation: Normal      ---------------------------------------------------PHYSICAL EXAM--------------------------------------      Constitutional/General: Alert and oriented x3, well appearing, non toxic in NAD  Head: NC/AT  Eyes: PERRL, EOMI  Mouth: Oropharynx clear, handling secretions, no trismus  Neck: Supple, full ROM, no meningeal signs  Pulmonary: Lungs clear to auscultation bilaterally, no wheezes, rales, or rhonchi. Not in respiratory distress  Cardiovascular:  Regular rate and rhythm, no murmurs, gallops, or rubs. 2+ distal pulses  Abdomen: Soft, non tender, non distended,   Extremities: Noted left-sided weakness patient has normal strength on right side  Skin: warm and dry without rash  Neurologic: GCS 15,  Psych: Normal Affect      ------------------------------ ED COURSE/MEDICAL DECISION MAKING----------------------  Medications - No data to display      Medical Decision Making:    Patient will have NG tube placed rather than dobhoff. IV team normally puts those in. Patient will have NG tube placed temporarily he will need to return back to hospital for outpatient placement. Rechecked and in no distress. Patient made aware of findings and need for follow-up  Counseling: The emergency provider has spoken with the patient and discussed todays results, in addition to providing specific details for the plan of care and counseling regarding the diagnosis and prognosis.   Questions are answered at this time and they are agreeable with the plan.      --------------------------------- IMPRESSION AND DISPOSITION ---------------------------------    IMPRESSION  1.  Encounter for nasogastric (NG) tube placement        DISPOSITION  Disposition: Discharge to nursing home  Patient condition is stable                  Raudel Carpio MD  11/26/20 421 [facility] Maco Lloyd MD  11/27/20 0111

## 2020-11-27 NOTE — ED NOTES
Multiple attempts to call facility. Was placed on hold and never answered.      Felisa Courser, RN  11/27/20 3225

## 2020-11-30 PROBLEM — A41.9 SEPSIS (HCC): Status: ACTIVE | Noted: 2020-01-01

## 2020-11-30 NOTE — ED NOTES
Bed: HE  Expected date:   Expected time:   Means of arrival:   Comments:  JAYMIE Lemos RN  11/30/20 2773

## 2020-11-30 NOTE — ED PROVIDER NOTES
67-year-old male fell out of a nursing home bed earlier today. Says he fell onto his left shoulder. Had a stroke about 1 month ago, was admitted to the hospital that time and has been in the nursing facility since then. He is awake, alert, oriented at this time. Has left sided deficit relative to his stroke. Has an NG tube through his nose because he refused a PEG tube. Not in any significant distress, denies any injury or trauma to his head or his neck. Answers questions appropriately. He said that the rails were down on his bed not when he fell out. Timing is sudden onset of the discomfort related to the fall, and achiness, mild severity, duration of couple hours, associated with the fall out of bed. Family History   Problem Relation Age of Onset    Coronary Art Dis Father         aged 63    Breast Cancer Mother     Mult Sclerosis Sister      Past Surgical History:   Procedure Laterality Date    BACK SURGERY      [facility]    IR MECHANICAL ART THROMBECTOMY INTRACRANIAL  10/23/2020    IR MECHANICAL ART THROMBECTOMY INTRACRANIAL 10/23/2020 [facility]    TUMOR EXCISION      melanoma on the back       Review of Systems   Constitutional: Negative for chills and fever. Respiratory: Negative for chest tightness and shortness of breath. Cardiovascular: Negative for chest pain and palpitations. Gastrointestinal: Negative for abdominal pain. Musculoskeletal:        Left hip and left shoulder discomfort   All other systems reviewed and are negative. Physical Exam  Constitutional:       General: He is not in acute distress. Appearance: He is well-developed. HENT:      Head: Normocephalic and atraumatic. Eyes:      Pupils: Pupils are equal, round, and reactive to light. Neck:      Musculoskeletal: Normal range of motion and neck supple. Thyroid: No thyromegaly.    Cardiovascular:      Rate and Rhythm: Normal rate and regular rhythm. Pulmonary:      Effort: Pulmonary effort is normal. No respiratory distress. Breath sounds: Normal breath sounds. No wheezing. Abdominal:      General: There is no distension. Palpations: Abdomen is soft. There is no mass. Tenderness: There is no abdominal tenderness. There is no guarding or rebound. Musculoskeletal: Normal range of motion. General: No tenderness. Skin:     General: Skin is warm and dry. Findings: No erythema. Neurological:      Mental Status: He is alert and oriented to person, place, and time. Cranial Nerves: No cranial nerve deficit. Comments: Residual weakness the left arm and left leg from prior stroke, discomfort to the left hip and left shoulder, no obvious deformity or ecchymosis          Procedures     MDM     ED Course as of Dec 01 0731   Mon Nov 30, 2020   1500 Patient remains tachycardic, he initially was but that heart rate came down to the 90s. He is tachycardic again. Blood work EKG will be done for evaluation.    [SO]   1700 Patient remained very tachycardic, there is no obvious abnormalities within the blood work. Questioning fever as there is no other findings or reasons for this. [SO]   61 54 78 Patient being evaluated now rectal temperature 102. Tylenol suppository ordered.    [SO]   2006 Spoke with Dr. Devante Castano. Discussed case. Agreeable to admission at this time.    [SB]      ED Course User Index  [SB] TREVON Quick  [SO] Kathy Spine, DO      ED Course as of Dec 01 0731   [facility] Nov 30, 2020   1500 Patient remains tachycardic, he initially was but that heart rate came down to the 90s. He is tachycardic again. Blood work EKG will be done for evaluation.    [SO]   1700 Patient remained very tachycardic, there is no obvious abnormalities within the blood work. Questioning fever as there is no other findings or reasons for this. [SO]   61 54 78 Patient being evaluated now rectal temperature 102.   Tylenol Influenza A by PCR Not Detected Not Detected    Influenza B by PCR Not Detected Not Detected    Mycoplasma pneumoniae by PCR Not Detected Not Detected    Parainfluenza Virus 1 by PCR Not Detected Not Detected    Parainfluenza Virus 2 by PCR Not Detected Not Detected    Parainfluenza Virus 3 by PCR Not Detected Not Detected    Parainfluenza Virus 4 by PCR Not Detected Not Detected    Respiratory Syncytial Virus by PCR Not Detected Not Detected   CBC   Result Value Ref Range    WBC 7.8 4.5 - 11.5 E9/L    RBC 4.81 3.80 - 5.80 E12/L    Hemoglobin 14.7 12.5 - 16.5 g/dL    Hematocrit 43.4 37.0 - 54.0 %    MCV 90.2 80.0 - 99.9 fL    MCH 30.6 26.0 - 35.0 pg    MCHC 33.9 32.0 - 34.5 %    RDW 11.9 11.5 - 15.0 fL    Platelets 715 254 - 331 E9/L    MPV 9.7 7.0 - 12.0 fL   Basic metabolic panel   Result Value Ref Range    Sodium 135 132 - 146 mmol/L    Potassium 4.3 3.5 - 5.0 mmol/L    Chloride 97 (L) 98 - 107 mmol/L    CO2 24 22 - 29 mmol/L    Anion Gap 14 7 - 16 mmol/L    Glucose 105 (H) 74 - 99 mg/dL    BUN 14 8 - 23 mg/dL    CREATININE 0.7 0.7 - 1.2 mg/dL    GFR Non-African American >60 >=60 mL/min/1.73    GFR African American >60     Calcium 9.5 8.6 - 10.2 mg/dL   Troponin   Result Value Ref Range    Troponin <0.01 0.00 - 0.03 ng/mL   Magnesium   Result Value Ref Range    Magnesium 1.9 1.6 - 2.6 mg/dL   Brain Natriuretic Peptide   Result Value Ref Range    Pro- 0 - 125 pg/mL   Lactate, Sepsis   Result Value Ref Range    Lactic Acid, Sepsis 2.2 (H) 0.5 - 1.9 mmol/L   Urinalysis   Result Value Ref Range    Color, UA Yellow Straw/Yellow    Clarity, UA Clear Clear    Glucose, Ur Negative Negative mg/dL    Bilirubin Urine SMALL (A) Negative    Ketones, Urine 40 (A) Negative mg/dL    Specific Gravity, UA >=1.030 1.005 - 1.030    Blood, Urine Negative Negative    pH, UA 6.0 5.0 - 9.0    Protein, UA Negative Negative mg/dL    Urobilinogen, Urine 1.0 <2.0 E.U./dL    Nitrite, Urine Negative Negative    Leukocyte Esterase, Urine TRACE (A) Negative   Microscopic Urinalysis   Result Value Ref Range    WBC, UA 2-5 0 - 5 /HPF    RBC, UA 1-3 0 - 2 /HPF    Bacteria, UA MODERATE (A) None Seen /HPF    Crystals, UA Rare (A) None Seen /HPF   Lactic Acid, Plasma   Result Value Ref Range    Lactic Acid 1.0 0.5 - 2.2 mmol/L   Basic Metabolic Panel   Result Value Ref Range    Sodium 136 132 - 146 mmol/L    Potassium 4.1 3.5 - 5.0 mmol/L    Chloride 99 98 - 107 mmol/L    CO2 28 22 - 29 mmol/L    Anion Gap 9 7 - 16 mmol/L    Glucose 95 74 - 99 mg/dL    BUN 17 8 - 23 mg/dL    CREATININE 1.3 (H) 0.7 - 1.2 mg/dL    GFR Non-African American 56 >=60 mL/min/1.73    GFR African American >60     Calcium 9.0 8.6 - 10.2 mg/dL   Lactic acid, plasma   Result Value Ref Range    Lactic Acid 1.0 0.5 - 2.2 mmol/L   EKG 12 Lead   Result Value Ref Range    Ventricular Rate 137 BPM    Atrial Rate 137 BPM    P-R Interval 166 ms    QRS Duration 76 ms    Q-T Interval 270 ms    QTc Calculation (Bazett) 407 ms    P Axis 87 degrees    R Axis 94 degrees    T Axis 62 degrees       RADIOLOGY:  XR CHEST 1 VIEW   Final Result   No acute process. CT HEAD WO CONTRAST   Final Result   No acute intracranial abnormality. CT CERVICAL SPINE WO CONTRAST   Final Result   1. There is no acute compression fracture or subluxation of the cervical   spine. 2. Multilevel degenerative disc and degenerative joint disease. XR SHOULDER LEFT (MIN 2 VIEWS)   Final Result   1. Degenerative changes of the left AC joint otherwise unremarkable left   shoulder      XR HIP 2-3 VW W PELVIS LEFT   Final Result   No acute radiographic findings. XR ABDOMEN FOR NG/OG/NE TUBE PLACEMENT    (Results Pending)         ------------------------- NURSING NOTES AND VITALS REVIEWED ---------------------------  Date / Time Roomed:  11/30/2020 10:30 AM  ED Bed Assignment:  [id number]    The nursing notes within the ED encounter and vital signs as below have been reviewed.      Patient Vitals for the past 24 hrs:   BP Temp Temp src Pulse Resp SpO2 Height Weight   12/01/20 0300 125/73 97.3 °F (36.3 °C) Temporal 107 15 94 % -- 183 lb 10.3 oz (83.3 kg)   12/01/20 0000 111/79 97.8 °F (36.6 °C) Temporal 108 16 98 % 6' 2\" (1.88 m) 207 lb (93.9 kg)   11/30/20 2239 106/76 98.7 °F (37.1 °C) -- 113 20 97 % -- --   11/30/20 1909 124/77 99.6 °F (37.6 °C) -- 109 17 95 % -- --   11/30/20 1730 111/73 102.2 °F (39 °C) -- 129 -- -- -- --   11/30/20 1459 -- -- -- 130 -- -- -- --   11/30/20 1345 121/81 97.6 °F (36.4 °C) -- 128 18 93 % -- --   11/30/20 1130 106/69 98.4 °F (36.9 °C) -- 95 17 98 % -- --   11/30/20 1032 108/72 98.5 °F (36.9 °C) Temporal 125 16 96 % -- --       Oxygen Saturation Interpretation: Normal    ------------------------------------------ PROGRESS NOTES ------------------------------------------  Re-evaluation(s):    Patients symptoms show no change  Repeat physical examination is not changed -patient remained tachycardic. No evidence of injury from the fall, blood work, cultures, urinalysis, chest x-ray ordered for you evaluation because of the elevated temperature and the tachycardia. Fluids were ordered, Tylenol was given as well. Counseling:  I have spoken with the patient and discussed todays results, in addition to providing specific details for the plan of care and counseling regarding the diagnosis and prognosis. Their questions are answered at this time and they are agreeable with the plan of admission.    --------------------------------- ADDITIONAL PROVIDER NOTES ---------------------------------  Consultations:   Spoke with Dr. Nevin Warner. Discussed case. They will admit the patient. This patient's ED course included: a personal history and physicial examination, re-evaluation prior to disposition, multiple bedside re-evaluations and IV medications    This patient has remained hemodynamically stable during their ED course. Diagnosis:  1. Fall at nursing home, initial encounter    2. SIRS (systemic inflammatory response syndrome) (HCC)        Disposition:  Patient's disposition: Admit to telemetry  Patient's condition is stable. ATTENDING PROVIDER ATTESTATION:     Libby Farr presented to the emergency department for evaluation of Fall (unwitnessed fall out of bed this AM, denies LOC c/o left shoulder and left hip pain)    I have reviewed and discussed the case, including pertinent history (medical, surgical, family and social) and exam findings with the Midlevel and the Nurse assigned to Libby Farr. I have personally performed and/or participated in the history, exam, medical decision making, and procedures and agree with all pertinent clinical information. The nursing notes within the ED encounter and vital signs as below have been reviewed by myself  Vitals:    12/01/20 0300   BP: 125/73   Pulse: 107   Resp: 15   Temp: 97.3 °F (36.3 °C)   SpO2: 94%         Oxygen Saturation Interpretation: Normal      The patients available past medical records and past encounters were reviewed. MDM: Asked to see this patient by LISET, required direct physician involvement secondary to complexity of case. I, Dr. Breanna Marquez am the primary provider of record      I have reviewed my findings and recommendations with Libby Farr and members of family present at the time of disposition.       --------------------------------- IMPRESSION AND DISPOSITION ---------------------------------    IMPRESSION  1. SIRS (systemic inflammatory response syndrome) (HCC)    2.  Fall at nursing home, initial encounter        DISPOSITION  Disposition: Admit to telemetry  Patient condition is stable         Gilmer Petty DO  12/01/20 3118

## 2020-12-01 NOTE — PROGRESS NOTES
mobility [x]  ADLs [x] Strength [x]  Cognition [x]  Functional transfers  [x] IADLs [x] Safety Awareness [x]  Endurance [x]  Fine Motor Coordination [] Balance [x] Vision/perception [] Sensation []   Gross Motor Coordination [] ROM [x] Delirium []                  Motor Control []       Plan of Care: 1-3 days/week for 1-2 weeks PRN   [x]ADL retraining/adapted techniques and AE recommendations to increase functional independence within precautions                    [x]Energy conservation techniques to improve tolerance for selfcare routine   [x]Functional transfer/mobility training/DME recommendations for increased independence, safety and fall prevention         [x]Patient/family education to increase safety and functional independence             [x]Environmental modifications for safe mobility and completion of ADLs                             [x]Cognitive retraining ex's to improve problem solving skills & safe participation in ADLs/IADLs     [x]Sensory re-education techniques to improve extremity awareness, maintain skin integrity and improve hand function                             []Visual/Perceptual retraining  to improve body awareness and safety during transfers and ADLs  []Splinting/positioning needs to maintain joint/skin integrity and prevent contractures  [x]Therapeutic activity to improve functional performance during ADLs. [x]Therapeutic exercise to improve tolerance and functional strength for ADLs   [x]Balance retraining/tolerance tasks for facilitation of postural control with dynamic challenges during ADLs .   [x]Neuromuscular re-education: facilitation of righting/equilibrium reactions, midline orientation, scapular stability/mobility, Normalization muscle     tone and facilitation active functional movement/Attention                         []Delirium prevention/treatment    [x]Positioning to improve functional independence  []Other:       Rehab Potential:  Good for established goals     Patient / Family Goal: none stated       Patient and/or family were instructed on functional diagnosis, prognosis/goals and OT plan of care. Demonstrated poor  understanding. · Mod  Complexity  · History: Expanded review of medical records and additional review of physical, cognitive, or psychosocial history related to current functional performance  · Exam: 3+ performance deficits  · Assistance/Modification: Max assistance or modifications required to perform tasks. May have comorbidities that affect occupational performance. Time In:1556  Time Out: 1615        Min Units   OT Eval Low 83481      OT Eval Medium 97166  x 1   OT Eval High 17079       OT Re-Eval [id number]       Therapeutic Ex 32910       Therapeutic Activities 23542       ADL/Self Care 60961       Orthotic Management 67173       Neuro Re-Ed 93557       Non-Billable Time          Evaluation Time includes thorough review of current medical information, gathering information on past medical history/social history and prior level of function, completion of standardized testing/informal observation of tasks, assessment of data and education on plan of care and goals.             Padmini Langley OTR/L [id number]

## 2020-12-01 NOTE — CARE COORDINATION
Patient admitted from [facility]. Spoke with lew martel who states patient was there skilled, not a bedhold but they will accept back. COVID was negative on 11/30/20. They will need PT/OT and a precert to return. Once therapy evals in they will start a precert. Await auth. Envelope and ambulance form on soft chart.

## 2020-12-01 NOTE — PROGRESS NOTES
Kem Luis was ordered [employer] which is a nonformulary medication. The patient's home supply of this medication should be brought in to the hospital for inpatient use. If the medication has not been administered by 1400 on the following day from the time the order was placed, a pharmacist will follow-up with the nurse of the patient to assess the capability of the patient to bring in the medication. If it is determined that the patient cannot supply the medication and it is not available to be dispensed from the pharmacy, a call will be placed to the ordering provider to discuss alternative options.

## 2020-12-01 NOTE — PLAN OF CARE
Problem: Skin Integrity:  Goal: Will show no infection signs and symptoms  Description: Will show no infection signs and symptoms  Outcome: Met This Shift  Goal: Absence of new skin breakdown  Description: Absence of new skin breakdown  Outcome: Met This Shift     Problem: Falls - Risk of:  Goal: Will remain free from falls  Description: Will remain free from falls  12/1/2020 0438 by Ravinder Harrington RN  Outcome: Met This Shift  12/1/2020 0138 by Annita Reis RN  Outcome: Met This Shift  Goal: Absence of physical injury  Description: Absence of physical injury  12/1/2020 0438 by Ravinder Harrington RN  Outcome: Met This Shift  12/1/2020 0138 by Annita Reis RN  Outcome: Met This Shift

## 2020-12-01 NOTE — PROGRESS NOTES
Physical Therapy    Physical Therapy Initial Assessment     Name: Maddie Larry  : 1955  MRN: [id number]    Referring Provider:  Martin Washington MD     Date of Service: 2020    Evaluating PT:  Marlee Sarmad PT, DPT [id number]      Room #:  4521/8154-H  Diagnosis:  Sepsis  PMHx/PSHx:  Leukemia, thyroid disease, gout, back surgery (for spinal stenosis), tumor excision, IR MCA thrombectomy (10/23/20)  Procedure/Surgery:  None this admission  Precautions:  Falls, L hemiplegia, L neglect, bed alarm  Equipment Needs:  TBD    SUBJECTIVE:    Pt admitted from [place] [address] ([address] [place] [address]). He is a questionable historian but provides the following: He reports recently requiring madai lift for transfers and denies use of w/c for mobility stating he is primarily in bed during the day. Pt states he is able to sit EOB with assistance vs supervision recently. OBJECTIVE:   Initial Evaluation  Date: 20 Treatment Short Term/ Long Term   Goals   AM-PAC 6 Clicks      Was pt agreeable to Eval/treatment? yes     Does pt have pain? Pain with all movement and touch-- does not localize or quantify     Bed Mobility  Rolling: Max A to L, resistant to R  Supine to sit: Max A x2  Sit to supine: Max A x2  Scooting: Max A x2 to EOB  Rolling: Min A  Supine to sit: Mod A  Sit to supine: Mod A  Scooting: Mod A   Transfers Sit to stand: NT  Stand to sit: NT  Stand pivot: NT  Sit to stand: Max A  Stand to sit: Max A  Stand pivot: Max A x2   Ambulation    NT  NA   Stair negotiation: ascended and descended  NT  NA   ROM BUE:  Limited due to pain with movements and touch  BLE:  Limited due to pain with movements and touch- RLE grossly WNL     Strength BUE:  L UE lacking active movement, R UE grossly WNL  BLE:  L LE lacking active movement, R LE grossly WNL     Balance Sitting EOB:  Max A  Dynamic Standing:  NT  Sitting EOB:  Min A  Dynamic Standing: Max A x2     Pt is A & O x 2-3 self, place and year.  Pt states month is January  Sensation: Pt denies numbness and tingling to extremities- unable to formally assess due to difficulty following cues  Edema:  Unremarkable    Patient education  Pt educated on role of therapy, safety with mobility, completion of bed mobility and improved sitting balance    Patient response to education:   Pt verbalized understanding Pt demonstrated skill Pt requires further education in this area   yes With assist yes     ASSESSMENT:    Comments:  Pt resting semi-supine upon arrival, agreeable to PT eval. Pt demonstrates difficulty following single step cues consistently (<25% of session) and at times is resistive to movement towards R side. Formal MMT unable to be assessed at this time due to cognition and poor ability to follow cues. Pt maintains R UE to 90 degrees shoulder flexion and abducted with hand placed behind head throughout majority of session causing rolling towards R to have increased difficulty and poor ability to utilize R UE to assist with pushing trunk to upright. Pt demonstrates strong R UE pushing with poor ability to correct while sitting EOB due to L hemiplegia requiring Max A to maintain upright posture. Pt returned to supine and assisted with rolling to adjust linens with resistance to rolling R noted despite Max A x2 to attempt. Pt semi-supine with all needs in reach and bed alarm set at end of session. Treatment:  Patient practiced and was instructed in the following treatment:     Bed mobility: cues for sequencing and manual assist for progression of extremities, manual assist for trunk lift   Therapeutic activities: education for importance of continued mobility, sitting EOB approximately 3 minutes with Max A- cues for R UE placement to avoid pushing, manual assist for sitting balance    Pt's/ family goals   1. To improve independence with mobility    Patient and or family understand(s) diagnosis, prognosis, and plan of care.   yes    PLAN OF CARE:    Current Treatment Recommendations   [x] Strengthening     [x] ROM   [x] Balance Training   [x] Endurance Training   [x] Transfer Training   [] Gait Training   [] Stair Training   [x] Positioning   [x] Safety and Education Training   [x] Patient/Caregiver Education   [] HEP  [] Other       PT care will be provided in accordance with the objectives noted above. Whenever appropriate, clear delegation orders will be provided for nursing staff. Exercises and functional mobility practice will be used as well as appropriate assistive devices or modalities to obtain goals. Patient and family education will also be administered as needed. Frequency of treatments: 2-5x/week x 7-10 days. Time in  1500  Time out  1524    Total Treatment Time  15 minutes     Evaluation Time includes thorough review of current medical information, gathering information on past medical history/social history and prior level of function, completion of standardized testing/informal observation of tasks, assessment of data and education on plan of care and goals.     CPT codes:  [x] Low Complexity PT evaluation 43512  [] Moderate Complexity PT evaluation 25941  [] High Complexity PT evaluation 12275  [] PT Re-evaluation 16265  [] Gait training 21555 0 minutes  [] Manual therapy 87918 0 minutes  [x] Therapeutic activities 16652 15 minutes  [] Therapeutic exercises 73125 0 minutes  [] Neuromuscular reeducation 92687 0 minutes     Martita Nixon, PT, DPT  [id number]

## 2020-12-01 NOTE — H&P
[facility]  History and Physical      CHIEF COMPLAINT: Fall with pain    Reason for Admission: Fever, suspected urinary tract infection with sepsis    History Obtained From: Electronic medical record    PCP :  Sigrid Morelos MD    [address] [address] [facility] [address] [id number]      HISTORY OF PRESENT ILLNESS:      The patient is a 60 y.o. male sent in from a local nursing home after a fall. He then experienced some left shoulder pain. He was then sent to the emergency room for further evaluation. He does have a history of dysphagia and does have an NG tube through his nose. In the emergency room patient was noted to be tachycardic. There was evidence for urinary tract infection. He also had a fever of 102 °F.  Initially had lactic acidosis as well. He was negative for SARS-CoV-2. Procalcitonin was low at 0.08. At the time of questioning patient denies any significant complaints. He does have aphasia.     Past Medical History:        Diagnosis Date    Allergic rhinitis     had allergy shots    Cancer ([address]     leukemia    Gout     Thyroid disease      Past Surgical History:        Procedure Laterality Date    BACK SURGERY      [facility]    IR MECHANICAL ART THROMBECTOMY INTRACRANIAL  10/23/2020    IR MECHANICAL ART THROMBECTOMY INTRACRANIAL 10/23/2020 [facility]    TUMOR EXCISION      melanoma on the back         Medications Prior to Admission:    Medications Prior to Admission: aspirin 81 MG chewable tablet, Take 81 mg by mouth daily  cyclobenzaprine (FLEXERIL) 5 MG tablet, Take 5 mg by mouth 3 times daily  bisacodyl (DULCOLAX) 10 MG suppository, Place 10 mg rectally daily as needed for Constipation  fentanyl (DURAGESIC), Place 1 patch onto the skin every 72 hours 12 mcg/hr  magnesium hydroxide (MILK OF MAGNESIA) 400 MG/5ML suspension, Take 30 mLs by mouth daily as needed for Constipation  traMADol (ULTRAM) 50 MG tablet, Take 50 mg by mouth every 4 hours as needed for Pain.   sertraline (ZOLOFT) 50 MG tablet, Take 60 mg by mouth daily  colchicine (COLCRYS) 0.6 MG tablet, Take 0.6 mg by mouth daily  Febuxostat 80 MG TABS, Take 80 mg by mouth daily  levothyroxine (SYNTHROID) 50 MCG tablet, Take 50 mcg by mouth Daily  valACYclovir (VALTREX) 1 g tablet, Take 1,000 mg by mouth daily  ibuprofen (ADVIL;MOTRIN) 800 MG tablet, Take 1 tablet by mouth every 6 hours as needed for Pain    Allergies:  Bactrim [sulfamethoxazole-trimethoprim]    Social History:   Social History     Socioeconomic History    Marital status:      Spouse name: Not on file    Number of children: 1    Years of education: Not on file    Highest education level: Not on file   Occupational History    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   Social Needs    Financial resource strain: Not on file    Food insecurity     Worry: Not on file     Inability: Not on file   [employer] needs     Medical: Not on file     Non-medical: Not on file   Tobacco Use    Smoking status: Never Smoker    Smokeless tobacco: Never Used   Substance and Sexual Activity    Alcohol use: No     Alcohol/week: 0.0 standard drinks     Comment: social    Drug use: No    Sexual activity: Not on file   Lifestyle    Physical activity     Days per week: Not on file     Minutes per session: Not on file    Stress: Not on file   Relationships    Social connections     Talks on phone: Not on file     Gets together: Not on file     Attends Congregation service: Not on file     Active member of club or organization: Not on file     Attends meetings of clubs or organizations: Not on file     Relationship status: Not on file    Intimate partner violence     Fear of current or ex partner: Not on file     Emotionally abused: Not on file     Physically abused: Not on file     Forced sexual activity: Not on file   Other Topics Concern    Not on file   Social History Narrative    Not on file Family History:       Problem Relation Age of Onset    Coronary Art Dis Father         aged 63    Breast Cancer Mother     Mult Sclerosis Sister        REVIEW OF SYSTEMS:    General ROS: Positive for fever in the emergency room  Hematological and Lymphatic ROS: negative  Endocrine ROS: negative  Respiratory ROS: no cough,  wheezing  or shortness of breath,   Cardiovascular ROS: no chest pain or dyspnea on exertion  Gastrointestinal ROS: no abdominal pain, change in bowel habits, or black or bloody stools  Genito-Urinary ROS: no dysuria, trouble voiding, or hematuria  Neurological ROS: no TIA or stroke symptoms  negative    Vitals:  /74   Pulse 100   Temp 98.1 °F (36.7 °C) (Oral)   Resp 15   Ht 6' 2\" (1.88 m)   Wt 183 lb 10.3 oz (83.3 kg)   SpO2 95%   BMI 23.58 kg/m²     PHYSICAL EXAM:  General:  Awake, alert, oriented X 3. Well developed, well nourished, well groomed. No apparent distress. HEENT:  Normocephalic, atraumatic. Pupils equal, round, reactive to light. No scleral icterus. No conjunctival injection. Neck:  Supple, no carotid bruits  Heart:  RRR,   Lungs:  CTA bilaterally, bilat symmetrical expansion, no wheeze, rales, or rhonchi  Abdomen:   Bowel sounds present, soft, nontender, no masses, no organomegaly, no peritoneal signs  Extremities:  No clubbing, cyanosis, or edema  Skin:  Warm and dry, no open lesions or rash  Neuro:  Cranial nerves 2-12 intact, no focal deficits  Does have NG tube through his nose    DATA:     Recent Results (from the past 24 hour(s))   Lactate, Sepsis    Collection Time: 11/30/20  5:17 PM   Result Value Ref Range    Lactic Acid, Sepsis 2.2 (H) 0.5 - 1.9 mmol/L   Culture, Urine    Collection Time: 11/30/20  5:17 PM    Specimen: Urine, clean catch   Result Value Ref Range    Organism Gram positive cocci (A)     Urine Culture, Routine       >100,000 CFU/ml  Identification and sensitivity to follow     Urinalysis    Collection Time: 11/30/20  5:17 PM Result Value Ref Range    Color, UA Yellow Straw/Yellow    Clarity, UA Clear Clear    Glucose, Ur Negative Negative mg/dL    Bilirubin Urine SMALL (A) Negative    Ketones, Urine 40 (A) Negative mg/dL    Specific Gravity, UA >=1.030 1.005 - 1.030    Blood, Urine Negative Negative    pH, UA 6.0 5.0 - 9.0    Protein, UA Negative Negative mg/dL    Urobilinogen, Urine 1.0 <2.0 E.U./dL    Nitrite, Urine Negative Negative    Leukocyte Esterase, Urine TRACE (A) Negative   Microscopic Urinalysis    Collection Time: 11/30/20  5:17 PM   Result Value Ref Range    WBC, UA 2-5 0 - 5 /HPF    RBC, UA 1-3 0 - 2 /HPF    Bacteria, UA MODERATE (A) None Seen /HPF    Crystals, UA Rare (A) None Seen /HPF   Respiratory Panel, Molecular, with COVID-19 (Restricted: peds pts or suitable admitted adults)    Collection Time: 11/30/20  5:51 PM    Specimen: Nasopharyngeal   Result Value Ref Range    Adenovirus by PCR Not Detected Not Detected    Bordetella parapertussis by PCR Not Detected Not Detected    Bordetella pertussis by PCR Not Detected Not Detected    Chlamydophilia pneumoniae by PCR Not Detected Not Detected    Coronavirus 229E by PCR Not Detected Not Detected    Coronavirus HKU1 by PCR Not Detected Not Detected    Coronavirus NL63 by PCR Not Detected Not Detected    Coronavirus OC43 by PCR Not Detected Not Detected    SARS-CoV-2, PCR Not Detected Not Detected    Human Metapneumovirus by PCR Not Detected Not Detected    Human Rhinovirus/Enterovirus by PCR Not Detected Not Detected    Influenza A by PCR Not Detected Not Detected    Influenza B by PCR Not Detected Not Detected    Mycoplasma pneumoniae by PCR Not Detected Not Detected    Parainfluenza Virus 1 by PCR Not Detected Not Detected    Parainfluenza Virus 2 by PCR Not Detected Not Detected    Parainfluenza Virus 3 by PCR Not Detected Not Detected    Parainfluenza Virus 4 by PCR Not Detected Not Detected    Respiratory Syncytial Virus by PCR Not Detected Not Detected   Lactic Acid, Plasma    Collection Time: 11/30/20  8:29 PM   Result Value Ref Range    Lactic Acid 1.0 0.5 - 2.2 mmol/L   Basic Metabolic Panel    Collection Time: 12/01/20  6:15 AM   Result Value Ref Range    Sodium 136 132 - 146 mmol/L    Potassium 4.1 3.5 - 5.0 mmol/L    Chloride 99 98 - 107 mmol/L    CO2 28 22 - 29 mmol/L    Anion Gap 9 7 - 16 mmol/L    Glucose 95 74 - 99 mg/dL    BUN 17 8 - 23 mg/dL    CREATININE 1.3 (H) 0.7 - 1.2 mg/dL    GFR Non-African American 56 >=60 mL/min/1.73    GFR African American >60     Calcium 9.0 8.6 - 10.2 mg/dL   Lactic acid, plasma    Collection Time: 12/01/20  6:15 AM   Result Value Ref Range    Lactic Acid 1.0 0.5 - 2.2 mmol/L   Procalcitonin    Collection Time: 12/01/20  6:15 AM   Result Value Ref Range    Procalcitonin 0.08 0.00 - 0.08 ng/mL       XR ABDOMEN FOR NG/OG/NE TUBE PLACEMENT   Final Result   Satisfactory position of the NG tube within the stomach. XR CHEST 1 VIEW   Final Result   No acute process. CT HEAD WO CONTRAST   Final Result   No acute intracranial abnormality. CT CERVICAL SPINE WO CONTRAST   Final Result   1. There is no acute compression fracture or subluxation of the cervical   spine. 2. Multilevel degenerative disc and degenerative joint disease. XR SHOULDER LEFT (MIN 2 VIEWS)   Final Result   1. Degenerative changes of the left AC joint otherwise unremarkable left   shoulder      XR HIP 2-3 VW W PELVIS LEFT   Final Result   No acute radiographic findings. ASSESSMENT :      Active Problems:    Sepsis ([address]  Resolved Problems:    * No resolved hospital problems.  *  Fever of questionable etiology question of ever  Dysphagia  Recent stroke      Plan :  Continue feeds as before  Question of intermittent aspiration even with the NG tube  Await cultures  Empiric antibiotics  Procalcitonin is low  If he is clinically stable to discharge to subacute rehab      Electronically signed by Lisa Fairbanks MD on 12/1/2020 at 4:11 PM    NOTE: This report was transcribed using voice recognition software.  Every effort was made to ensure accuracy; however, inadvertent transcription errors may be present

## 2020-12-01 NOTE — PROGRESS NOTES
Patient admitted with fentanyl patch on his L-chest, patch removed and wasted by this RN.  Waste witnessed by Tae Bruce

## 2020-12-02 PROBLEM — E43 SEVERE MALNUTRITION (HCC): Status: ACTIVE | Noted: 2020-01-01

## 2020-12-02 NOTE — PROGRESS NOTES
Subjective:    Chief complaint:  Seen earlier this morning  Awake alert  Does have aphasia  Denies complaints  Since admission he has remained afebrile  No further spikes of fever    Objective:    /77   Pulse 97   Temp 98.3 °F (36.8 °C) (Temporal)   Resp 18   Ht 6' 2\" (1.88 m)   Wt 182 lb 8.7 oz (82.8 kg)   SpO2 95%   BMI 23.44 kg/m²   General : Awake ,alert,no distress. Heart:  RRR, no murmurs, gallops, or rubs.   Lungs:  CTA bilaterally, no wheeze, rales or rhonchi  Abd: bowel sounds present, nontender, nondistended, no masses  Extrem:  No clubbing, cyanosis, or edema  NG tube in place  CBC:   Lab Results   Component Value Date    WBC 7.8 11/30/2020    RBC 4.81 11/30/2020    HGB 14.7 11/30/2020    HCT 43.4 11/30/2020    MCV 90.2 11/30/2020    MCH 30.6 11/30/2020    MCHC 33.9 11/30/2020    RDW 11.9 11/30/2020     11/30/2020    MPV 9.7 11/30/2020     BMP:    Lab Results   Component Value Date     12/02/2020    K 3.2 12/02/2020    CL 99 12/02/2020    CO2 24 12/02/2020    BUN 14 12/02/2020    LABALBU 4.3 10/23/2020    LABALBU 4.8 04/27/2012    CREATININE 0.6 12/02/2020    CALCIUM 8.3 12/02/2020    GFRAA >60 12/02/2020    LABGLOM >60 12/02/2020    GLUCOSE 84 12/02/2020    GLUCOSE 112 04/27/2012     PT/INR:    Lab Results   Component Value Date    PROTIME 11.4 10/23/2020    INR 1.0 10/23/2020     Troponin:    Lab Results   Component Value Date    TROPONINI <0.01 11/30/2020       Recent Labs     12/01/20  0140   LABURIN >100,000 CFU/ml  Identification and sensitivity to follow       Recent Labs     11/30/20  1701   BC 24 Hours no growth     Recent Labs     11/30/20  1717   BLOODCULT2 24 Hours no growth         Current Facility-Administered Medications:     colchicine (COLCRYS) tablet 0.6 mg, 0.6 mg, Oral, Daily, [place] Larry, MD, 0.6 mg at 12/02/20 0940    febuxostat (ULORIC) tablet 80 mg, 80 mg, Oral, Daily, Suraj Juarez MD    levothyroxine (SYNTHROID) tablet 50 mcg, 50 mcg, of questionable etiology  Procalcitonin was very low borderline at 0.08   dysphagia  Recent stroke    Plan:    Urine culture growing Enterococcus faecalis which is more than likely a contaminant  Fever question if it is intermittent aspiration  Patient took out NG  We will leave it out  We will also check videoscopic swallow eval  If he passes this discharge back to rehab  Fever could be just erroneous reading versus aspiration      Alex Tejeda  1:30 PM  12/2/2020    NOTE: This report was transcribed using voice recognition software.  Every effort was made to ensure accuracy; however, inadvertent transcription errors may be present

## 2020-12-02 NOTE — CARE COORDINATION
Patient remains here under observation for Fever, suspected urinary tract infection with sepsis. Per Dayanna Watson from [facility], patient can discharge there and no need to wait for precert under the precert waiver. Per  Dr. Mauri Hughes, pateint needs to have a video swallow before discharge. Modified barium video swallow ordered. Await results and plan. Ambulance form in envelope in soft chart.   Link Tomasz ANGULO CM

## 2020-12-02 NOTE — PLAN OF CARE
Problem: Skin Integrity:  Goal: Will show no infection signs and symptoms  Description: Will show no infection signs and symptoms  12/2/2020 0248 by Cari Almeida RN  Outcome: Met This Shift  12/2/2020 0044 by Destiney Rogers RN  Outcome: Met This Shift  Goal: Absence of new skin breakdown  Description: Absence of new skin breakdown  12/2/2020 0248 by Cari Almeida RN  Outcome: Met This Shift  12/2/2020 0044 by Destiney Rogers RN  Outcome: Met This Shift     Problem: Falls - Risk of:  Goal: Will remain free from falls  Description: Will remain free from falls  12/2/2020 0248 by Cari Almeida RN  Outcome: Met This Shift  12/2/2020 0044 by Destiney Rogers RN  Outcome: Met This Shift  Goal: Absence of physical injury  Description: Absence of physical injury  12/2/2020 0248 by Cari Almeida RN  Outcome: Met This Shift  12/2/2020 0044 by Destiney Rogers RN  Outcome: Met This Shift

## 2020-12-02 NOTE — PROGRESS NOTES
SPEECH/LANGUAGE PATHOLOGY  VIDEOFLUOROSCOPIC STUDY OF SWALLOWING (MBS)      PATIENT NAME:  Kem Luis      :  1955      TODAY'S DATE:  2020   ROOM:  7811/2753-X    SUMMARY OF EVALUATION     DYSPHAGIA DIAGNOSIS:  Moderate Pharyngeal dysphagia      DIET RECOMMENDATIONS:  Dental soft (Easy to Chew) solids with  honey consistency (moderately thick) liquids and The Backscratchers Protocol in between meals     The Backscratchers Protocol --- thickened liquids as prescribed during all meals/ PO intake and regular water only, 30 minutes post PO intake,only after ORAL CARE (brush oral cavity and rinse thoroughly). Research has shown that aspiration of water in presence of a clean oral cavity in Pt's who are able to ambulate poses little risk of aspiration pneumonia or the negative consequences of aspiration. Use of pharyngeal muscles via this protocol will prevent further muscle disuse atrophy, while additionally supporting increased hydration needs. FEEDING RECOMMENDATIONS:     Assistance level:  Supervision is needed during all oral intake      Compensatory strategies recommended: Double swallow, Small bites/sips and Alternate solids and liquids    THERAPY RECOMMENDATIONS:      Dysphagia therapy is recommended 3-5 times per week for LOS or when goals are met. Pt will complete BOTR strength/ ROM exercises to reduce pharyngeal residuals and improve epiglottic inversion with  moderate verbal prompts.    Pt will complete laryngeal strength/ ROM therapeutic exercises to improve airway protection for the least restrictive PO diet with  moderate verbal prompts   Instruction regarding appropriate implementation of compensatory strategies to improve integrity of swallow function during PO intake    A Video Swallow Study (MBSS) is recommended and requires a physician order in 4 to 6 weeks               PROCEDURE     Consistencies Administered During the Evaluation   Liquids: thin liquid, nectar thick liquid and honey thick liquid   Solids:  pureed foods and soft solid foods      Method of Intake:   cup, spoon  Fed by clinician      Position:   Seated, upright    Current Respiratory Status   room air                RESULTS     ORAL STAGE       Decreased bolus formation resulting in premature pharyngeal spillage        PHARYNGEAL STAGE           ONSET TIME     Delayed initiation of the pharyngeal swallow was noted with swallow reflex triggered at the level of the vallecula and pyriform sinus       PHARYNGEAL RESIDUALS          Vallecula/Pharyngeal Wall           Reduced tongue base retraction and impaired epiglottic inversion resulting in residuals in vallecula and/or posterior pharyngeal wall for all consistencies administered which cleared to mostly cleared with cued double swallow      Pyriform Sinuses      No significant residuals were noted in the pyriform sinuses    LARYNGEAL PENETRATION     Laryngeal penetration occurred in the absence of aspiration DURING the swallow for thin liquid due to  delayed laryngeal closure and inadequate laryngeal closure which remained in the laryngeal vestibule. . Laryngeal penetration was mild and occurred consistently   an absent cough/throat clear was noted                 ASPIRATION    Aspiration occurred AFTER the swallow for nectar consistency liquid due to pharyngeal residuals . Aspiration was moderate and occurred inconsistently . an absent cough/throat clear was noted         COMPENSATORY STRATEGIES       Compensatory strategies that were not beneficial included Chin tuck and Effortful swallow      STRUCTURAL/FUNCTIONAL ANOMALIES       No structural/functional anomalies were noted      CERVICAL ESOPHAGEAL STAGE :        The cervical esophagus appeared adequate                            The Speech Language Pathologist (SLP) completed education with the patient regarding results of evaluation.    Explained that Speech Pathology intervention is warranted  at this time   Prognosis for improvements is fair +     This plan will be re-evaluated and revised in 1 week  if warranted. Patient stated goals: Agreed with above,   Treatment goals discussed with Patient   The Patient understand(s) the diagnosis, prognosis and plan of care       CPT code:  05609  dysphagia study        [x]The admitting diagnosis and active problem list, as listed below have been reviewed prior to initiation of this evaluation.      ADMITTING DIAGNOSIS: Sepsis ([facility] [address] [A41.9]     ACTIVE PROBLEM LIST:   Patient Active Problem List   Diagnosis    Chronic lymphocytic leukemia ([address]    Acquired hypothyroidism    Chronic idiopathic gout of left foot    Recurrent genital herpes    Diabetes mellitus type 2 in nonobese ([address]    Prediabetes    Stroke, acute, thrombotic ([address]    HLD (hyperlipidemia)    Cerebral edema (HCC)    Sepsis ([address]    Severe malnutrition ([address]

## 2020-12-03 PROBLEM — A41.9 SEPSIS (HCC): Status: RESOLVED | Noted: 2020-01-01 | Resolved: 2020-01-01

## 2020-12-03 PROBLEM — E43 SEVERE MALNUTRITION (HCC): Status: RESOLVED | Noted: 2020-01-01 | Resolved: 2020-01-01

## 2020-12-03 NOTE — PLAN OF CARE
Problem: Skin Integrity:  Goal: Will show no infection signs and symptoms  Description: Will show no infection signs and symptoms  12/3/2020 0829 by Rosalino Simmons RN  Outcome: Met This Shift  12/3/2020 0829 by Rosalino Simmons RN  Outcome: Met This Shift  12/3/2020 0045 by Tye Navarro RN  Outcome: Met This Shift  Goal: Absence of new skin breakdown  Description: Absence of new skin breakdown  12/3/2020 0829 by Rosalino Simmons RN  Outcome: Met This Shift  12/3/2020 0829 by Rosalino Simmons RN  Outcome: Met This Shift  12/3/2020 0045 by Tye Navarro RN  Outcome: Met This Shift     Problem: Falls - Risk of:  Goal: Will remain free from falls  Description: Will remain free from falls  12/3/2020 0829 by Rosalino Simmons RN  Outcome: Met This Shift  12/3/2020 0829 by Rosalino Simmons RN  Outcome: Met This Shift  12/3/2020 0045 by Tye Navarro RN  Outcome: Met This Shift  Goal: Absence of physical injury  Description: Absence of physical injury  12/3/2020 0829 by Rosalino Simmons RN  Outcome: Met This Shift  12/3/2020 0829 by Rosalino Simmons RN  Outcome: Met This Shift  12/3/2020 0045 by Tye Navarro RN  Outcome: Met This Shift

## 2020-12-03 NOTE — DISCHARGE INSTR - COC
Continuity of Care Form    Patient Name: Darinel Bryant   :  1955  MRN:  [id number]    Admit date:  2020  Discharge date:  2020    Code Status Order: Full Code   Advance Directives:   Advance Care Flowsheet Documentation       Date/Time Healthcare Directive Type of Healthcare Directive Copy in [address] Agent's Name Healthcare Agent's Phone Number    20 0000  No, patient does not have an advance directive for healthcare treatment -- -- -- -- --            Admitting Physician:  Amado Storm MD  PCP: Amado Storm MD    Discharging Nurse: [id number] [facility] Unit/Room#: 7101/7246-H  Discharging Unit Phone Number: [phone]    Emergency Contact:   Extended Emergency Contact Information  Primary Emergency Contact: Ginger Godfrey  Address: [address]           Residence [place] [address] Phone: [phone]  Mobile Phone: [phone]  Relation: Spouse  Secondary Emergency Contact: Elissa Alas Phone: [phone]  Relation: Parent    Past Surgical History:  Past Surgical History:   Procedure Laterality Date    BACK SURGERY      [facility]    IR MECHANICAL ART THROMBECTOMY INTRACRANIAL  10/23/2020    IR MECHANICAL ART THROMBECTOMY INTRACRANIAL 10/23/2020 [facility]    TUMOR EXCISION      melanoma on the back       Immunization History:   Immunization History   Administered Date(s) Administered    Influenza, MDCK Quadv, with preserv IM (Flucelvax 4 yrs and older) 10/08/2017    Influenza, Quadv, IM, (6 mo and older Fluzone, Flulaval, Fluarix and 3 yrs and older Afluria) 10/25/2005    Influenza, Trivalent vaccine 6-35 months, IM 10/16/2016    Pneumococcal Polysaccharide ([id number]) 10/16/2016       Active Problems:  Patient Active Problem List   Diagnosis Code    Chronic lymphocytic leukemia ([facility] [address] C91.10    Acquired hypothyroidism E03.9    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.0720    Recurrent genital herpes A60.00    Diabetes mellitus type 2 in nonobese (HCC) E11.9    Prediabetes R73.03    Stroke, acute, thrombotic (HCC) I63.9    HLD (hyperlipidemia) E78.5    Cerebral edema (HCC) G93.6    Sepsis (HCC) A41.9    Severe malnutrition ([address] E43       Isolation/Infection:   Isolation            No Isolation          Patient Infection Status       Infection Onset Added Last Indicated Last Indicated By Review Planned Expiration Resolved Resolved By    None active    Resolved    COVID-19 Rule Out 11/30/20 11/30/20 11/30/20 Respiratory Panel, Molecular, with COVID-19 (Restricted: peds pts or suitable admitted adults) (Ordered)   11/30/20 Rule-Out Test Resulted    COVID-19 Rule Out 11/03/20 11/03/20 11/03/20 Covid-19 Ambulatory (Ordered)   11/04/20 Rule-Out Test Resulted    COVID-19 Rule Out 10/29/20 10/29/20 10/29/20 COVID-19 (Ordered)   10/29/20 Rule-Out Test Resulted            Nurse Assessment:  Last Vital Signs: /83   Pulse 102   Temp 97.9 °F (36.6 °C) (Temporal)   Resp 17   Ht 6' 2\" (1.88 m)   Wt 184 lb 11.9 oz (83.8 kg)   SpO2 97%   BMI 23.72 kg/m²     Last documented pain score (0-10 scale): Pain Level: 5  Last Weight:   Wt Readings from Last 1 Encounters:   12/03/20 184 lb 11.9 oz (83.8 kg)     Mental Status:  Alert and oriented to person    IV Access:  none    Nursing Mobility/ADLs:  Walking  Max assist   Transfer Max assist   Bathing Max assist   Dressing Max assist   Toileting Max   Feeding Set up  Med Admin whole  Med Delivery   Whole by mouth with water     Wound Care Documentation and Therapy:        Elimination:  Continence:   · Bowel: yes   · Bladder: yes whitehead removed   Urinary Catheter: removed   Colostomy/Ileostomy/Ileal Conduit: no       Date of Last BM:     Intake/Output Summary (Last 24 hours) at 12/3/2020 1055  Last data filed at 12/3/2020 0557  Gross per 24 hour   Intake 1586 ml   Output 1800 ml   Net -214 ml     I/O last 3 completed shifts: In: 3051 [I.V.:1586]  Out: 1800 [Urine:1800]    Safety Concerns:     Unsteady, flaccid left side, confused     Impairments/Disabilities:      Flaccid left side      Nutrition Therapy:  Current Nutrition Therapy:   Cardiac dental soft diet     Routes of Feeding: by mouth  Liquids:regular   Daily Fluid Restriction: no  Last Modified Barium Swallow with Video (Video Swallowing Test): 12/2/2020    Treatments at the Time of Hospital Discharge:   Respiratory Treatments: n/a  Oxygen Therapy:  no  Ventilator:    no    Rehab Therapies: pt/ot  Weight Bearing Status/Restrictions: WBAT  Other Medical Equipment (for information only, NOT a DME order):  {  Other Treatments:     Patient's personal belongings (please select all that are sent with patient):  yes    RN SIGNATURE:  Electronically signed by Loretha Kayser, RN on 12/3/20 at 11:18 AM EST    CASE MANAGEMENT/SOCIAL WORK SECTION    Inpatient Status Date: ***    Readmission Risk Assessment Score:  Readmission Risk              Risk of Unplanned Readmission:        0           Discharging to Facility/ Agency   · Name:   · Address:  · Phone:  · Fax:    Dialysis Facility (if applicable)   · Name:  · Address:  · Dialysis Schedule:  · Phone:  · Fax:    / signature: {Esignature:[id number]:::0}    PHYSICIAN SECTION    Prognosis: {Prognosis:6668819504:::0}    Condition at Discharge: Flip Colon Patient Condition:081863811:::0}    Rehab Potential (if transferring to Rehab): {Prognosis:1406184307:::0}    Recommended Labs or Other Treatments After Discharge: ***    Physician Certification: I certify the above information and transfer of Marika Correa  is necessary for the continuing treatment of the diagnosis listed and that he requires {Admit to Appropriate Level of Care:89813:::0} for {GREATER/LESS:727091142} 30 days.      Update Admission H&P: No change in H&P    PHYSICIAN SIGNATURE:  Electronically signed by Jerrell Puentes MD on 12/3/20 at 10:55 AM EST

## 2020-12-03 NOTE — PROGRESS NOTES
100 mL/hr at 12/02/20 1833    cefTRIAXone (ROCEPHIN) 1 g in sterile water 10 mL IV syringe, 1 g, Intravenous, Q24H, Noah Mooney MD, 1 g at 12/02/20 2118    acetaminophen (TYLENOL) tablet 650 mg, 650 mg, Oral, Q6H PRN **OR** acetaminophen (TYLENOL) suppository 650 mg, 650 mg, Rectal, Q6H PRN, Noah Mooney MD    ondansetron (ZOFRAN) injection 4 mg, 4 mg, Intravenous, Q6H PRN, Noah Mooney MD    polyethylene glycol (GLYCOLAX) packet 17 g, 17 g, Oral, Daily, Noah Mooney MD, 17 g at 12/03/20 0856    sodium chloride flush 0.9 % injection 10 mL, 10 mL, Intravenous, 2 times per day, Noah Mooney MD, 10 mL at 12/02/20 2118    sodium chloride flush 0.9 % injection 10 mL, 10 mL, Intravenous, PRN, Noah Mooney MD    mineral oil-hydrophilic petrolatum (HYDROPHOR) ointment, , Topical, BID PRN, Noah Mooney MD    DIET CARDIAC; Dental Soft    FL MODIFIED BARIUM SWALLOW W VIDEO   Final Result   Swallowing dysfunction as described above including deep penetration of thin   liquid barium and silent aspiration of nectar. Please see separate speech pathology report for full discussion of findings   and recommendations. XR ABDOMEN FOR NG/OG/NE TUBE PLACEMENT   Final Result   Satisfactory position of the NG tube within the stomach. XR CHEST 1 VIEW   Final Result   No acute process. CT HEAD WO CONTRAST   Final Result   No acute intracranial abnormality. CT CERVICAL SPINE WO CONTRAST   Final Result   1. There is no acute compression fracture or subluxation of the cervical   spine. 2. Multilevel degenerative disc and degenerative joint disease. XR SHOULDER LEFT (MIN 2 VIEWS)   Final Result   1. Degenerative changes of the left AC joint otherwise unremarkable left   shoulder      XR HIP 2-3 VW W PELVIS LEFT   Final Result   No acute radiographic findings.           Assessment:    Active Problems:    Sepsis ([address]    Severe malnutrition

## 2020-12-03 NOTE — PROGRESS NOTES
Patient refuses to be turned or eat anything this am, stating he failed his swallow eval, nurse explained he did not fail and could eat patient still refusing at this time

## 2020-12-03 NOTE — CARE COORDINATION
Discharge order noted. Patient was here under observation for Fever, suspected urinary tract infection with sepsis. He did pass his video swallow yesterday. Transportation set up via [employer] for 2 pm today to [facility]. Charge nurse, RN, liaison and patient's wife Camille Whitney all notified. Ambulance form in envelope in soft chart.  Link Tomasz ANGULO CM

## 2020-12-19 NOTE — DISCHARGE SUMMARY
Physician Discharge Summary     Patient ID:  Bib Murray  [id number]  35 y.o.  1955    Admit date: 11/30/2020    Discharge date and time: 12/3/2020  3:22 PM     Admission Diagnoses: Active Problems:    * No active hospital problems. *  Resolved Problems:    Sepsis ([address]    Severe malnutrition ([address]      Discharge Diagnoses: Active Problems:    * No active hospital problems. *  Resolved Problems:    Sepsis ([address]    Severe malnutrition ([address]      Condition at discharge : Stable    Consults: IP CONSULT TO DIETITIAN    Procedures: None    Hospital Course: Patient is a 45-year-old gentleman who recently had a stroke and was at a rehab when he experienced left shoulder pain with a fall. In the emergency room patient was noted to have possible urinary tract infection with sepsis picture. He was then admitted for further evaluation treatment. His procalcitonin level was low at 0.08. SARS-CoV-2 testing was negative. Question of intermittent aspiration. He was given empiric antibiotics. Urine culture revealed Enterococcus faecalis. This was felt to be a contaminant. Fever was thought to be from aspiration. Patient removed his NG tube. This was left off. Videoscopic swallow was done. Patient did pass this. He was then discharged back to rehab. FL MODIFIED BARIUM SWALLOW W VIDEO   Final Result   Swallowing dysfunction as described above including deep penetration of thin   liquid barium and silent aspiration of nectar. Please see separate speech pathology report for full discussion of findings   and recommendations. XR ABDOMEN FOR NG/OG/NE TUBE PLACEMENT   Final Result   Satisfactory position of the NG tube within the stomach. XR CHEST 1 VIEW   Final Result   No acute process. CT HEAD WO CONTRAST   Final Result   No acute intracranial abnormality. CT CERVICAL SPINE WO CONTRAST   Final Result   1. There is no acute compression fracture or subluxation of the cervical   spine. 2. Multilevel degenerative disc and degenerative joint disease. XR SHOULDER LEFT (MIN 2 VIEWS)   Final Result   1. Degenerative changes of the left AC joint otherwise unremarkable left   shoulder      XR HIP 2-3 VW W PELVIS LEFT   Final Result   No acute radiographic findings. No results found for this or any previous visit (from the past 336 hour(s)). Discharge Exam:  See progress note from today    Disposition: Subacute rehab    Patient Instructions:   Discharge Medication List as of 12/3/2020  2:31 PM      START taking these medications    Details   amoxicillin-clavulanate (AUGMENTIN) 250-62.5 MG/5ML suspension Take 20 mLs by mouth 2 times daily for 10 days, Disp-400 mL,R-0NO PRINT         CONTINUE these medications which have NOT CHANGED    Details   aspirin 81 MG chewable tablet Take 81 mg by mouth dailyHistorical Med      bisacodyl (DULCOLAX) 10 MG suppository Place 10 mg rectally daily as needed for ConstipationHistorical Med      magnesium hydroxide (MILK OF MAGNESIA) 400 MG/5ML suspension Take 30 mLs by mouth daily as needed for ConstipationHistorical Med      traMADol (ULTRAM) 50 MG tablet Take 50 mg by mouth every 4 hours as needed for Pain. Historical Med      sertraline (ZOLOFT) 50 MG tablet Take 60 mg by mouth dailyHistorical Med      Febuxostat 80 MG TABS Take 80 mg by mouth dailyHistorical Med      levothyroxine (SYNTHROID) 50 MCG tablet Take 50 mcg by mouth DailyHistorical Med      valACYclovir (VALTREX) 1 g tablet Take 1,000 mg by mouth dailyHistorical Med         STOP taking these medications       cyclobenzaprine (FLEXERIL) 5 MG tablet Comments:   Reason for Stopping:         fentanyl (DURAGESIC) Comments:   Reason for Stopping:         colchicine (COLCRYS) 0.6 MG tablet Comments:   Reason for Stopping:         ibuprofen (ADVIL;MOTRIN) 800 MG tablet Comments:   Reason for Stopping:               Activity: As tolerated    Diet: As tolerated Follow-up with [facility] [address] [address] [phone]            Note that over 30 minutes was spent in preparing discharge papers, discussing discharge with patient, medication review, etc.    Signed:  David Drake  12/19/2020  6:59 PM

## 2020-12-22 PROBLEM — U07.1 COVID-19: Status: ACTIVE | Noted: 2020-01-01

## 2020-12-22 NOTE — CONSULTS
Nephrology Consult Note  Patient's Name: Avani Velásquez  3:11 PM  12/22/2020    Nephrologist: Dr. Luan New    Reason for Consult:  AGUSTIN & Hypernatremia  Requesting Physician:  Radha Rojas MD    Chief Complaint:  AMS and SOB    History Obtained From:  past medical records    History of Present Ilness:    Avani Velásquez is a 60 y.o. 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 He had a recent hospitalization 11/30-12/3/2020 for sepsis and severe malnutrition. He had recently had a stroke and was at a rehab, where he suffered a fall with subsequent left shoulder pain and went to ER for evaluation and was found to have a possible uti with sepsis. Urine culture revealed Enterococcus faecalis. During that hospitalization, he pulled out his NG tube, a video swallow was completed, of which he passed. He was sent to the ER this am, 12/22/2020, with a change in mental status and shortness of breath. There was no fever. Pt was dehydrated. Significant labs included:  Wbc 32, Cr 5.3, Na 160, AST 81, , procalcitonin 1.46, and lactic acid 3.2. He was treated with ivf, vanc, ceftriaxone, and cefepime. SARS/ CoV2 +positive . Vitals upon presentation:  T 96.9, R 22, P 120, BP 98/75, 78% on room air, he was placed on heated high flow oxygen 60 L with FiO2 100. He has been nonverbal and non communicating. Physical exam not performed d/t +COVID 19 Infection.      Past Medical History:   Diagnosis Date    Allergic rhinitis     had allergy shots    Cancer ([address]     leukemia    Gout     Thyroid disease        Past Surgical History:   Procedure Laterality Date    [facility]      [facility]    IR MECHANICAL ART THROMBECTOMY INTRACRANIAL  10/23/2020    IR MECHANICAL ART THROMBECTOMY INTRACRANIAL 10/23/2020 [facility]    TUMOR EXCISION      melanoma on the back       Family History Problem Relation Age of Onset    Coronary Art Dis Father         aged 63    Breast Cancer Mother     Mult Sclerosis Sister         reports that he has never smoked. He has never used smokeless tobacco. He reports that he does not drink alcohol or use drugs. Allergies:  Bactrim [sulfamethoxazole-trimethoprim]    Current Medications:        lactated ringers infusion 1,000 mL, Continuous      sodium chloride flush 0.9 % injection 10 mL, 2 times per day      sodium chloride flush 0.9 % injection 10 mL, PRN      heparin (porcine) injection 5,000 Units, 3 times per day      Febuxostat TABS 80 mg, Daily      levothyroxine (SYNTHROID) tablet 50 mcg, Daily      aspirin chewable tablet 81 mg, Daily      bisacodyl (DULCOLAX) suppository 10 mg, Daily PRN      magnesium hydroxide (MILK OF MAGNESIA) 400 MG/5ML suspension 30 mL, Daily PRN      traMADol (ULTRAM) tablet 50 mg, Q4H PRN      sertraline (ZOLOFT) tablet 50 mg, Daily      acetaminophen (TYLENOL) tablet 650 mg, Q6H PRN    Or      acetaminophen (TYLENOL) suppository 650 mg, Q6H PRN      [START ON 12/23/2020] dexamethasone (DECADRON) tablet 6 mg, Daily      Vitamin D (CHOLECALCIFEROL) tablet 2,000 Units, Daily      0.9 % sodium chloride bolus, Once      cefepime (MAXIPIME) 1 g IVPB extended (mini-bag), Q12H    And      0.9 % sodium chloride infusion admixture, Q12H      vancomycin (VANCOCIN) intermittent dosing (placeholder), RX Placeholder      vancomycin (VANCOCIN) 1,750 mg in dextrose 5 % 500 mL IVPB, Once      0.45 % sodium chloride infusion, Continuous        Review of Systems:   Pertinent items are noted in HPI.     Physical exam:   Constitutional:    Vitals: VITALS:  /75   Pulse 107   Temp 98 °F (36.7 °C) (Axillary)   Resp (!) 33   Ht 6' 2\" (1.88 m)   Wt 185 lb (83.9 kg)   SpO2 96%   BMI 23.75 kg/m²   24HR INTAKE/OUTPUT:  No intake or output data in the 24 hours ending 12/22/20 1513  URINARY CATHETER OUTPUT ([place]): LDH:  No results found for: LDH  Uric Acid:    Lab Results   Component Value Date    LABURIC 5.4 03/10/2017    URICACID 4.8 07/12/2016     PT/INR:    Lab Results   Component Value Date    PROTIME 14.6 12/22/2020    INR 1.3 12/22/2020     PTT:    Lab Results   Component Value Date    APTT 28.2 12/22/2020   [APTT}  Last 3 Troponin:    Lab Results   Component Value Date    TROPONINI 0.10 12/22/2020    TROPONINI <0.01 11/30/2020     U/A:    Lab Results   Component Value Date    COLORU DARK YELLOW 12/22/2020    PROTEINU TRACE 12/22/2020    PHUR 5.0 12/22/2020    WBCUA 0-1 12/22/2020    RBCUA NONE 12/22/2020    BACTERIA MODERATE 12/22/2020    CLARITYU Clear 12/22/2020    SPECGRAV >=1.030 12/22/2020    LEUKOCYTESUR TRACE 12/22/2020    UROBILINOGEN 1.0 12/22/2020    BILIRUBINUR MODERATE 12/22/2020    BLOODU Negative 12/22/2020    GLUCOSEU 100 12/22/2020     ABG:    Lab Results   Component Value Date    PH 7.403 12/22/2020    PCO2 33.5 12/22/2020    PO2 78.4 12/22/2020    HCO3 20.4 12/22/2020    BE -3.3 12/22/2020    O2SAT 95.3 12/22/2020     HgBA1c:    Lab Results   Component Value Date    LABA1C 5.3 10/25/2020     Microalbumen/Creatinine ratio:  No components found for: RUCREAT  FLP:    Lab Results   Component Value Date    TRIG 101 10/25/2020    HDL 48 10/25/2020    LDLCALC 91 10/25/2020    LABVLDL 20 10/25/2020     TSH:    Lab Results   Component Value Date    TSH 0.958 10/29/2020     VITAMIN B12: No components found for: B12  FOLATE:    Lab Results   Component Value Date    FOLATE 19.3 03/16/2015     IRON:  No results found for: IRON  Iron Saturation:  No components found for: PERCENTFE  TIBC:  No results found for: TIBC  FERRITIN:  No results found for: FERRITIN  RPR:  No results found for: RPR  AMYLASE:  No results found for: AMYLASE  LIPASE:  No results found for: LIPASE  Fibrinogen Level:  No components found for: FIB Urine Toxicology:  No components found for: Rodolph Diss, IBENZO, ICOCAINE, IMARTHC, IOPIATES, IPHENCYC     Imaging:  EXAMINATION:   CT OF THE HEAD WITHOUT CONTRAST  12/22/2020 3:53 am   TECHNIQUE:   CT of the head was performed without the administration of intravenous   contrast. Dose modulation, iterative reconstruction, and/or weight based   adjustment of the mA/kV was utilized to reduce the radiation dose to as low   as reasonably achievable. COMPARISON:   None. HISTORY:   ORDERING SYSTEM PROVIDED HISTORY: AMS   TECHNOLOGIST PROVIDED HISTORY:   Reason for exam:->AMS   Has a \"code stroke\" or \"stroke alert\" been called? ->No   What reading provider will be dictating this exam?->CRC   FINDINGS:   Stable extensive right-sided encephalomalacia likely with some minimal   dystrophic calcification.  No acute hemorrhage, mass or midline shift. Stable caliber of ventricles and sulci.  Unremarkable bony calvarium.       Impression   No new abnormal findings.  Encephalomalacia on the right as before. EXAMINATION:   CT OF THE CHEST WITHOUT CONTRAST 12/22/2020 3:53 am   TECHNIQUE:   CT of the chest was performed without the administration of intravenous   contrast. Multiplanar reformatted images are provided for review. Dose   modulation, iterative reconstruction, and/or weight based adjustment of the   mA/kV was utilized to reduce the radiation dose to as low as reasonably   achievable. COMPARISON:   October 7, 2009   HISTORY:   ORDERING SYSTEM PROVIDED HISTORY: sob, hypoxia   TECHNOLOGIST PROVIDED HISTORY:   Reason for exam:->sob, hypoxia   What reading provider will be dictating this exam?->CRC   FINDINGS:   There is left lower lobe pneumonia and there is opacity at the right lower   lobe which is most likely atelectasis. Heart size is normal with some coronary artery calcification.  The caliber of   the thoracic aorta is within normal limits.  No mediastinal adenopathy is   present. No active process evident in the upper abdomen.  Chest wall is unremarkable.       Impression   Left lower lobe pneumonia.  Opacity at the right lower lobe is likely   atelectasis. Narrative   EXAMINATION:   CT OF THE ABDOMEN AND PELVIS WITHOUT CONTRAST 12/22/2020 3:53 am   TECHNIQUE:   CT of the abdomen and pelvis was performed without the administration of   intravenous contrast. Multiplanar reformatted images are provided for review. Dose modulation, iterative reconstruction, and/or weight based adjustment of   the mA/kV was utilized to reduce the radiation dose to as low as reasonably   achievable. COMPARISON:   October 7 2009   HISTORY:   ORDERING SYSTEM PROVIDED HISTORY: renal failure, weakness   TECHNOLOGIST PROVIDED HISTORY:   Reason for exam:->renal failure, weakness   Additional Contrast?->None   What reading provider will be dictating this exam?->CRC   FINDINGS:   Lower Chest: Reported separately. Organs: Gallbladder, liver, spleen, pancreas and adrenal glands demonstrate   nothing active. [place] is no evidence of hydronephrosis or renal parenchymal   wasting.  No space-occupying renal lesions or calcified stones are evident. GI/Bowel: Normal appendix. [place] is sigmoid diverticulosis. Pelvis: Nothing active. Peritoneum/Retroperitoneum: Nothing active. Bones/Soft Tissues: Nothing active.       Impression   No evidence of urinary tract obstruction or other demonstrable specific cause   of renal failure.  See above. EXAMINATION:   ONE XRAY VIEW OF THE CHEST   12/22/2020 3:45 am   COMPARISON:   30 November 2020   HISTORY:   ORDERING SYSTEM PROVIDED HISTORY: [facility]   TECHNOLOGIST PROVIDED HISTORY:   Reason for exam:->ams   What reading provider will be dictating this exam?->CRC   FINDINGS:   Left lower lobe pneumonia.  Heart and pulmonary vascularity are normal.   Neither costophrenic angle is blunted.       Impression   Left lower lobe pneumonia.          Assessment & Plan: 1. AGUSTIN in the setting of dehydration, combined with COVID-19 infection. baseline serum Cr 0.6-0.8 mg/dl with eGFR=>60 ml/min. Serum Cr OA   4.9-->5.3-->5.5  CT scan reveals no hydro, no calculi. Check UA, Urine lytes, urine cr, PVR, MCR    2. Hypernatremia in the setting of dehydration. Na+160 OA-->156  Continue ivf  Monitor Q 12 hrs    3. DM type II  Controlled as evidenced by HgbA1c 5.3 on 10/25/2020  Primary following    4. Leukocytosis  Wbc 32.2  No fever  LLL infiltrates  Received vanc, cefepime, ceftriaxone in ER  ID following. 5. Altered Mental Status  Primary following     6. COVID 19 Infection  On steroids  ID following.        Thank you Dr. Jesus Davidson MD for allowing us to participate in care of Preeti Adam Moore,SHANIA-CNP  4:15 PM  12/22/2020     Pt not examined due to covid  Agree with above  Trial of ivf for arf  Luisa Martinez

## 2020-12-22 NOTE — ED NOTES
Unable to obtain IV access.   Dr Maebelle Aase notifed, preparing for central line insertion     [facility]  12/22/20 5715

## 2020-12-22 NOTE — ED NOTES
Bed: 01  Expected date:   Expected time:   Means of arrival:   Comments:  Room Professor Telma Trinh, RN  12/22/20 0560

## 2020-12-22 NOTE — ED NOTES
Bed: 33  Expected date:   Expected time:   Means of arrival:   Comments:  Room clean - no bed     Conor Mcmillan RN  12/22/20 5111

## 2020-12-22 NOTE — CONSULTS
Department of Internal Medicine  Infectious Diseases   Consult Note      Reason for Consult:  COVID 23       Requesting Physician:  Dr Rena Billings:                Pt is non verbal and non communicating . This is a 60 yrs old male with hx of stroke , ? CLL from nursing home presented to the ER with change in mental status and shortness of breath . There had been no fever .  Pt was dehydrated   WBC was 32 K, Cr 5.3, Na 160, AST 81, . procalcitonin 1.46, lactic acid 3.2   CTA chest LLL infiltrates   Pt was given vancomycin, cefepime , ceftriaxone   SARS CoV2 PCR +ve       Past Medical History:      Past Medical History:   Diagnosis Date    Allergic rhinitis     had allergy shots    Cancer ([address]     leukemia    Gout     Thyroid disease          Past Surgical History:      Past Surgical History:   Procedure Laterality Date    [facility]    IR MECHANICAL ART THROMBECTOMY INTRACRANIAL  10/23/2020    IR MECHANICAL ART THROMBECTOMY INTRACRANIAL 10/23/2020 [facility]    TUMOR EXCISION      melanoma on the back         Current Medications:      Current Facility-Administered Medications   Medication Dose Route Frequency Provider Last Rate Last Admin    lactated ringers infusion 1,000 mL  1,000 mL Intravenous Continuous Arvind Padilla, DO 75 mL/hr at 12/22/20 0929 1,000 mL at 12/22/20 0929    sodium chloride flush 0.9 % injection 10 mL  10 mL Intravenous 2 times per day Trevon Morin MD        sodium chloride flush 0.9 % injection 10 mL  10 mL Intravenous PRN Trevon Morin MD        heparin (porcine) injection 5,000 Units  5,000 Units Subcutaneous 3 times per day Trevon Morin MD   5,000 Units at 12/22/20 7893    Febuxostat TABS 80 mg  80 mg Oral Daily Trevon Morin MD   Stopped at 12/22/20 1689  magnesium hydroxide (MILK OF MAGNESIA) 400 MG/5ML suspension Take 30 mLs by mouth daily as needed for Constipation      traMADol (ULTRAM) 50 MG tablet Take 50 mg by mouth every 4 hours as needed for Pain.      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   Social Needs    Financial resource strain: Not on file    Food insecurity     Worry: Not on file     Inability: Not on file   [employer] needs     Medical: Not on file     Non-medical: Not on file   Tobacco Use    Smoking status: Never Smoker    Smokeless tobacco: Never Used   Substance and Sexual Activity    Alcohol use: No     Alcohol/week: 0.0 standard drinks     Comment: social    Drug use: No    Sexual activity: Not on file   Lifestyle    Physical activity     Days per week: Not on file     Minutes per session: Not on file    Stress: Not on file   Relationships    Social connections     Talks on phone: Not on file     Gets together: Not on file     Attends [employer] service: Not on file     Active member of club or organization: Not on file     Attends meetings of clubs or organizations: Not on file     Relationship status: Not on file    Intimate partner violence     Fear of current or ex partner: Not on file     Emotionally abused: Not on file     Physically abused: Not on file     Forced sexual activity: Not on file   Other Topics Concern    Not on file Social History Narrative    Not on file   '    Family History:     Family History   Problem Relation Age of Onset    Coronary Art Dis Father         aged 61    Breast Cancer Mother     Mult Sclerosis Sister        REVIEW OF SYSTEMS: could not be obtained       PHYSICAL EXAM:      Vitals:     BP 99/69   Pulse 111   Temp 98 °F (36.7 °C) (Axillary)   Resp (!) 34   Ht 6' 2\" (1.88 m)   Wt 185 lb (83.9 kg)   SpO2 95%   BMI 23.75 kg/m²     General Appearance:    Non verbal, non communicating, on Opti flow oxygen    Head:    Normocephalic, atraumatic   Eyes:    +  pallor, no icterus,   Ears:    No obvious deformity or drainage.    Nose:   No nasal drainage   Throat:   Mucosa dry    Neck:   Supple, no lymphadenopathy   Lungs:     Decreased breath sound,    Heart:    Tachycardic    Abdomen:     Soft,not distended, bowel sounds present    Extremities:   No edema,    Pulses:   Dorsalis pedis palpable    Skin:   no rashes or lesions   CBC with Differential:      Lab Results   Component Value Date    WBC 32.2 12/22/2020    RBC 5.11 12/22/2020    HGB 15.2 12/22/2020    HCT 48.7 12/22/2020     12/22/2020    MCV 95.3 12/22/2020    MCH 29.7 12/22/2020    MCHC 31.2 12/22/2020    RDW 13.6 12/22/2020    SEGSPCT 21 04/27/2012    LYMPHOPCT 3.0 12/22/2020    MONOPCT 4.9 12/22/2020    BASOPCT 0.3 12/22/2020    MONOSABS 1.59 12/22/2020    LYMPHSABS 0.98 12/22/2020    EOSABS 0.00 12/22/2020    BASOSABS 0.09 12/22/2020       CMP     Lab Results   Component Value Date     12/22/2020    K 5.2 12/22/2020     12/22/2020    CO2 24 12/22/2020     12/22/2020    CREATININE 5.3 12/22/2020    GFRAA 13 12/22/2020    LABGLOM 11 12/22/2020    GLUCOSE 154 12/22/2020    GLUCOSE 112 04/27/2012    PROT 6.7 12/22/2020    LABALBU 3.0 12/22/2020    LABALBU 4.8 04/27/2012    CALCIUM 9.6 12/22/2020    BILITOT 1.8 12/22/2020    ALKPHOS 290 12/22/2020    AST 81 12/22/2020     12/22/2020       Hepatic Function Panel:

## 2020-12-22 NOTE — ED NOTES
Bed: 01  Expected date:   Expected time:   Means of arrival:   Comments:  terminal     Екатерина Cherry RN  12/22/20 4702

## 2020-12-22 NOTE — ED NOTES
Unable to find other RN to obtain blood cultures. Blood cultures obtained by this RN per protocol from Central line.       Luca Ribera RN  12/22/20 4641

## 2020-12-22 NOTE — H&P
traMADol (ULTRAM) 50 MG tablet, Take 50 mg by mouth every 4 hours as needed for Pain.   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   Social Needs    Financial resource strain: Not on file    Food insecurity     Worry: Not on file     Inability: Not on file   [employer] needs     Medical: Not on file     Non-medical: Not on file   Tobacco Use    Smoking status: Never Smoker    Smokeless tobacco: Never Used   Substance and Sexual Activity    Alcohol use: No     Alcohol/week: 0.0 standard drinks     Comment: social    Drug use: No    Sexual activity: Not on file   Lifestyle    Physical activity     Days per week: Not on file     Minutes per session: Not on file    Stress: Not on file   Relationships    Social connections     Talks on phone: Not on file     Gets together: Not on file     Attends Jew service: Not on file     Active member of club or organization: Not on file     Attends meetings of clubs or organizations: Not on file     Relationship status: Not on file    Intimate partner violence     Fear of current or ex partner: Not on file     Emotionally abused: Not on file     Physically abused: Not on file     Forced sexual activity: Not on file   Other Topics Concern    Not on file   Social History Narrative    Not on file         Family History:       Problem Relation Age of Onset    Coronary Art Dis Father         aged 61 Human Metapneumovirus by PCR Not Detected Not Detected    Human Rhinovirus/Enterovirus by PCR Not Detected Not Detected    Influenza A by PCR Not Detected Not Detected    Influenza B by PCR Not Detected Not Detected    Mycoplasma pneumoniae by PCR Not Detected Not Detected    Parainfluenza Virus 1 by PCR Not Detected Not Detected    Parainfluenza Virus 2 by PCR Not Detected Not Detected    Parainfluenza Virus 3 by PCR Not Detected Not Detected    Parainfluenza Virus 4 by PCR Not Detected Not Detected    Respiratory Syncytial Virus by PCR Not Detected Not Detected   Basic Metabolic Panel w/ Reflex to MG    Collection Time: 12/22/20  3:40 AM   Result Value Ref Range    Sodium 159 (H) 132 - 146 mmol/L    Potassium reflex Magnesium 4.8 3.5 - 5.0 mmol/L    Chloride 116 (H) 98 - 107 mmol/L    CO2 20 (L) 22 - 29 mmol/L    Anion Gap 23 (H) 7 - 16 mmol/L    Glucose 174 (H) 74 - 99 mg/dL     (H) 8 - 23 mg/dL    CREATININE 4.9 (H) 0.7 - 1.2 mg/dL    GFR Non-African American 12 >=60 mL/min/1.73    GFR African American 15     Calcium 10.3 (H) 8.6 - 10.2 mg/dL   CBC Auto Differential    Collection Time: 12/22/20  3:40 AM   Result Value Ref Range    WBC 33.4 (H) 4.5 - 11.5 E9/L    RBC 5.62 3.80 - 5.80 E12/L    Hemoglobin 16.4 12.5 - 16.5 g/dL    Hematocrit 51.4 37.0 - 54.0 %    MCV 91.5 80.0 - 99.9 fL    MCH 29.2 26.0 - 35.0 pg    MCHC 31.9 (L) 32.0 - 34.5 %    RDW 13.5 11.5 - 15.0 fL    Platelets 655 180 - 238 E9/L    MPV 12.9 (H) 7.0 - 12.0 fL    Neutrophils % 89.8 (H) 43.0 - 80.0 %    Immature Granulocytes % 1.4 0.0 - 5.0 %    Lymphocytes % 3.7 (L) 20.0 - 42.0 %    Monocytes % 4.7 2.0 - 12.0 %    Eosinophils % 0.0 0.0 - 6.0 %    Basophils % 0.4 0.0 - 2.0 %    Neutrophils Absolute 29.98 (H) 1.80 - 7.30 E9/L    Immature Granulocytes # 0.46 E9/L    Lymphocytes Absolute 1.24 (L) 1.50 - 4.00 E9/L    Monocytes Absolute 1.57 (H) 0.10 - 0.95 E9/L    Eosinophils Absolute 0.01 (L) 0.05 - 0.50 E9/L Basophils Absolute 0.15 0.00 - 0.20 E9/L    Anisocytosis 1+     Polychromasia 1+     Poikilocytes 1+     [place] Cells 1+     Ovalocytes 1+    Troponin    Collection Time: 12/22/20  3:40 AM   Result Value Ref Range    Troponin 0.10 (H) 0.00 - 0.03 ng/mL   Brain Natriuretic Peptide    Collection Time: 12/22/20  3:40 AM   Result Value Ref Range    Pro-BNP 1,385 (H) 0 - 125 pg/mL   Lactate, Sepsis    Collection Time: 12/22/20  3:40 AM   Result Value Ref Range    Lactic Acid, Sepsis 3.0 (H) 0.5 - 1.9 mmol/L   Protime-INR    Collection Time: 12/22/20  3:40 AM   Result Value Ref Range    Protime 14.6 (H) 9.3 - 12.4 sec    INR 1.3    APTT    Collection Time: 12/22/20  3:40 AM   Result Value Ref Range    aPTT 28.2 24.5 - 35.1 sec   D-Dimer, Quantitative    Collection Time: 12/22/20  3:40 AM   Result Value Ref Range    D-Dimer, Quant 3483 ng/mL DDU   Sedimentation Rate    Collection Time: 12/22/20  3:40 AM   Result Value Ref Range    Sed Rate 24 (H) 0 - 15 mm/Hr   C-Reactive Protein    Collection Time: 12/22/20  3:40 AM   Result Value Ref Range    CRP 11.7 (H) 0.0 - 0.4 mg/dL   Procalcitonin    Collection Time: 12/22/20  3:40 AM   Result Value Ref Range    Procalcitonin 1.26 (H) 0.00 - 0.08 ng/mL   Blood Gas, Arterial    Collection Time: 12/22/20  3:50 AM   Result Value Ref Range    Date Analyzed [id number]     Time Analyzed 0350     Source: Blood Arterial     pH, Blood Gas 7.479 (H) 7.350 - 7.450    PCO2 30.9 (L) 35.0 - 45.0 mmHg    PO2 61.1 (L) 75.0 - 100.0 mmHg    HCO3 22.4 22.0 - 26.0 mmol/L    B.E. 0.2 -3.0 - 3.0 mmol/L    O2 Sat 91.7 (L) 92.0 - 98.5 %    O2Hb 91.4 (L) 94.0 - 97.0 %    COHb 0.1 0.0 - 1.5 %    MetHb 0.2 0.0 - 1.5 %    O2 Content 22.2 mL/dL    HHb 8.3 (H) 0.0 - 5.0 %    tHb (est) 17.3 (H) 11.5 - 16.5 g/dL    Mode NRB 15L     Date Of Collection      Time Collected      Pt Temp 37.0 C     ID [id number]     Lab [id number]     Critical(s) Notified .  No Critical Values    Urinalysis, reflex to microscopic Collection Time: 12/22/20  4:03 AM   Result Value Ref Range    Color, UA DARK YELLOW (A) Straw/Yellow    Clarity, UA Clear Clear    Glucose, Ur 100 (A) Negative mg/dL    Bilirubin Urine MODERATE (A) Negative    Ketones, Urine TRACE (A) Negative mg/dL    Specific Gravity, UA >=1.030 1.005 - 1.030    Blood, Urine Negative Negative    pH, UA 5.0 5.0 - 9.0    Protein, UA TRACE Negative mg/dL    Urobilinogen, Urine 1.0 <2.0 E.U./dL    Nitrite, Urine POSITIVE (A) Negative    Leukocyte Esterase, Urine TRACE (A) Negative   Microscopic Urinalysis    Collection Time: 12/22/20  4:03 AM   Result Value Ref Range    Hyaline Casts, UA 0-2 0 - 2 /LPF    WBC, UA 0-1 0 - 5 /HPF    RBC, UA NONE 0 - 2 /HPF    Epithelial Cells, UA NONE SEEN /HPF    Bacteria, UA MODERATE (A) None Seen /HPF   Blood Gas, Arterial    Collection Time: 12/22/20  5:56 AM   Result Value Ref Range    Date Analyzed [id number]     Time Analyzed 0546     Source: Blood Arterial     pH, Blood Gas 7.403 7.350 - 7.450    PCO2 33.5 (L) 35.0 - 45.0 mmHg    PO2 78.4 75.0 - 100.0 mmHg    HCO3 20.4 (L) 22.0 - 26.0 mmol/L    B.E. -3.3 (L) -3.0 - 3.0 mmol/L    O2 Sat 95.3 92.0 - 98.5 %    PO2/FIO2 0.78 mmHg/%    AaDO2 576.1 mmHg    RI(T) 735 %    O2Hb 94.7 94.0 - 97.0 %    COHb 0.3 0.0 - 1.5 %    MetHb 0.3 0.0 - 1.5 %    O2 Content 22.8 mL/dL    HHb 4.7 0.0 - 5.0 %    tHb (est) 17.1 (H) 11.5 - 16.5 g/dL    Potassium 4.34 3.50 - 5.00 mmol/L    Mode HHFNC 60L     FIO2 100.0 %    Date Of Collection      Time Collected      Pt Temp 37.0 C     ID [id number]     Lab [id number]     Critical(s) Notified .  No Critical Values    Lactate, Sepsis    Collection Time: 12/22/20  8:50 AM   Result Value Ref Range    Lactic Acid, Sepsis 3.2 (H) 0.5 - 1.9 mmol/L   Comprehensive Metabolic Panel w/ Reflex to MG    Collection Time: 12/22/20  8:50 AM   Result Value Ref Range    Sodium 160 (H) 132 - 146 mmol/L    Potassium reflex Magnesium 5.2 (H) 3.5 - 5.0 mmol/L    Chloride 119 (H) 98 - 107 mmol/L CO2 24 22 - 29 mmol/L    Anion Gap 17 (H) 7 - 16 mmol/L    Glucose 154 (H) 74 - 99 mg/dL     (H) 8 - 23 mg/dL    CREATININE 5.3 (H) 0.7 - 1.2 mg/dL    GFR Non-African American 11 >=60 mL/min/1.73    GFR African American 13     Calcium 9.6 8.6 - 10.2 mg/dL    Total Protein 6.7 6.4 - 8.3 g/dL    Alb 3.0 (L) 3.5 - 5.2 g/dL    Total Bilirubin 1.8 (H) 0.0 - 1.2 mg/dL    Alkaline Phosphatase 290 (H) 40 - 129 U/L     (H) 0 - 40 U/L    AST 81 (H) 0 - 39 U/L   CBC Auto Differential    Collection Time: 12/22/20  8:50 AM   Result Value Ref Range    WBC 32.2 (H) 4.5 - 11.5 E9/L    RBC 5.11 3.80 - 5.80 E12/L    Hemoglobin 15.2 12.5 - 16.5 g/dL    Hematocrit 48.7 37.0 - 54.0 %    MCV 95.3 80.0 - 99.9 fL    MCH 29.7 26.0 - 35.0 pg    MCHC 31.2 (L) 32.0 - 34.5 %    RDW 13.6 11.5 - 15.0 fL    Platelets 930 561 - 710 E9/L    MPV 12.4 (H) 7.0 - 12.0 fL    Neutrophils % 90.7 (H) 43.0 - 80.0 %    Immature Granulocytes % 1.1 0.0 - 5.0 %    Lymphocytes % 3.0 (L) 20.0 - 42.0 %    Monocytes % 4.9 2.0 - 12.0 %    Eosinophils % 0.0 0.0 - 6.0 %    Basophils % 0.3 0.0 - 2.0 %    Neutrophils Absolute 29.24 (H) 1.80 - 7.30 E9/L    Immature Granulocytes # 0.34 E9/L    Lymphocytes Absolute 0.98 (L) 1.50 - 4.00 E9/L    Monocytes Absolute 1.59 (H) 0.10 - 0.95 E9/L    Eosinophils Absolute 0.00 (L) 0.05 - 0.50 E9/L    Basophils Absolute 0.09 0.00 - 0.20 E9/L    Anisocytosis 1+     Polychromasia 1+     Poikilocytes 1+     [place] Cells 1+    Fibrinogen    Collection Time: 12/22/20  8:50 AM   Result Value Ref Range    Fibrinogen >700 (H) 225 - 540 mg/dL   D-Dimer, Quantitative    Collection Time: 12/22/20  8:50 AM   Result Value Ref Range    D-Dimer, Quant 2552 ng/mL DDU   Procalcitonin    Collection Time: 12/22/20  8:50 AM   Result Value Ref Range    Procalcitonin 1.46 (H) 0.00 - 0.08 ng/mL   Basic Metabolic Panel w/ Reflex to MG    Collection Time: 12/22/20  2:56 PM   Result Value Ref Range    Sodium 156 (H) 132 - 146 mmol/L Potassium reflex Magnesium 4.8 3.5 - 5.0 mmol/L    Chloride 116 (H) 98 - 107 mmol/L    CO2 22 22 - 29 mmol/L    Anion Gap 18 (H) 7 - 16 mmol/L    Glucose 243 (H) 74 - 99 mg/dL     (H) 8 - 23 mg/dL    CREATININE 5.5 (H) 0.7 - 1.2 mg/dL    GFR Non-African American 11 >=60 mL/min/1.73    GFR African American 13     Calcium 9.4 8.6 - 10.2 mg/dL   Ferritin    Collection Time: 12/22/20  2:56 PM   Result Value Ref Range    Ferritin 1,489 ng/mL   Lactate dehydrogenase    Collection Time: 12/22/20  2:56 PM   Result Value Ref Range     (H) 135 - 225 U/L   Urine electrolytes    Collection Time: 12/22/20  4:43 PM   Result Value Ref Range    Sodium, Ur <20 Not Established mmol/L    Potassium, Ur >100.0 Not Established mmol/L    Chloride <20 Not Established mmol/L   Microalbumin / Creatinine Urine Ratio    Collection Time: 12/22/20  4:43 PM   Result Value Ref Range    Microalbumin, Random Urine 39.8 (H) Not Established mg/L    Creatinine, Ur 194 40 - 278 mg/dL    Microalbumin Creatinine Ratio 20.5 0.0 - 30.0   Urinalysis    Collection Time: 12/22/20  4:43 PM   Result Value Ref Range    Color, UA Yellow Straw/Yellow    Clarity, UA Clear Clear    Glucose, Ur 100 (A) Negative mg/dL    Bilirubin Urine MODERATE (A) Negative    Ketones, Urine Negative Negative mg/dL    Specific Gravity, UA >=1.030 1.005 - 1.030    Blood, Urine SMALL (A) Negative    pH, UA 5.0 5.0 - 9.0    Protein, UA TRACE Negative mg/dL    Urobilinogen, Urine 1.0 <2.0 E.U./dL    Nitrite, Urine POSITIVE (A) Negative    Leukocyte Esterase, Urine TRACE (A) Negative   Urea nitrogen, urine    Collection Time: 12/22/20  4:43 PM   Result Value Ref Range    Urea Nitrogen, Ur 767 (L) 800 - 1666 mg/dL   Microscopic Urinalysis    Collection Time: 12/22/20  4:43 PM   Result Value Ref Range    WBC, UA 1-3 0 - 5 /HPF    RBC, UA 0-1 0 - 2 /HPF    Bacteria, UA MODERATE (A) None Seen /HPF    Amorphous, UA FEW     Crystals, UA Few (A) None Seen /HPF XR CHEST PORTABLE   Final Result   Left lower lobe pneumonia. CT Head WO Contrast   Final Result   No new abnormal findings. Encephalomalacia on the right as before. CT ABDOMEN PELVIS WO CONTRAST Additional Contrast? None   Final Result   No evidence of urinary tract obstruction or other demonstrable specific cause   of renal failure. See above. CT CHEST WO CONTRAST   Final Result   Left lower lobe pneumonia. Opacity at the right lower lobe is likely   atelectasis. ASSESSMENT :      Active Problems:    COVID-19  Resolved Problems:    * No resolved hospital problems. *  Elevated procalcitonin likely HCAP  Hyponatremia  Acute kidney injury  Lactic acidosis  Recent stroke    Plan :    Cover for HCAP  Oxygen supplementation  Dexamethasone  Given renal dysfunction likely not a candidate for [facility]  Infectious disease consultation  Nephrology regarding hyponatremia and acute kidney injury      Electronically signed by Martin Washington MD on 12/22/2020 at 6:59 PM    NOTE: This report was transcribed using voice recognition software.  Every effort was made to ensure accuracy; however, inadvertent transcription errors may be present

## 2020-12-22 NOTE — ED PROVIDER NOTES
HPI:  12/22/20,   Time: 3:31 AM NOELLE Alatorre is a 60 y.o. male presenting to the ED for altered mental status, beginning 2 days ago. The complaint has been persistent, moderate in severity, and worsened by nothing. He has a history of a CVA. Patient resides in a nursing home. Per the nursing home the patient has had worsening altered mental status over the last 2 days therefore he is brought to our facility for further care. EMS found the patient to be hypoxic and was placed on a nonrebreather prior to arrival.  HPI limited by patient's mental status    Review of Systems:   Unable to be obtained due to patient's mental status          --------------------------------------------- PAST HISTORY ---------------------------------------------  Past Medical History:  has a past medical history of Allergic rhinitis, Cancer ([facility] [address] Gout, and Thyroid disease. Past Surgical History:  has a past surgical history that includes back surgery; tumor excision; and IR MECHANICAL ART THROMBECTOMY INTRACRANIAL (10/23/2020). Social History:  reports that he has never smoked. He has never used smokeless tobacco. He reports that he does not drink alcohol or use drugs. Family History: family history includes Breast Cancer in his mother; Coronary Art Dis in his father; Mult Sclerosis in his sister. The patients home medications have been reviewed.     Allergies: Bactrim [sulfamethoxazole-trimethoprim]        ---------------------------------------------------PHYSICAL EXAM--------------------------------------    Constitutional/General: Alert, disoriented x3, appears ill   Head: Normocephalic and atraumatic  Eyes: PERRL, EOMI, conjunctive normal, sclera non icteric  Mouth: Oropharynx clear, handling secretions, no trismus, no asymmetry of the posterior oropharynx or uvular edema  Neck: Supple, full ROM, non tender to palpation in the midline, no stridor, no crepitus, no meningeal signs Respiratory: Lungs clear to auscultation bilaterally, no wheezes, rales, or rhonchi. Not in respiratory distress  Cardiovascular: Tachycardic regular rhythm. No murmurs, gallops, or rubs. 2+ distal pulses  GI:  Abdomen Soft, Non tender, Non distended. +BS. No organomegaly, no palpable masses,  No rebound, guarding, or rigidity. Musculoskeletal: Moves all extremities x 4. Warm and well perfused, no clubbing, cyanosis, or edema. Capillary refill <3 seconds  Integument: skin warm and dry. No rashes. Neurologic: GCS 14, left-sided deficits, chronic, no new focal deficits   psychiatric: Normal Affect    -------------------------------------------------- RESULTS -------------------------------------------------  I have personally reviewed all laboratory and imaging results for this patient. Results are listed below.      LABS:  Results for orders placed or performed during the hospital encounter of 12/22/20   Respiratory Panel, Molecular, with COVID-19 (Restricted: peds pts or suitable admitted adults)    Specimen: Nasopharyngeal   Result Value Ref Range    Adenovirus by PCR Not Detected Not Detected    Bordetella parapertussis by PCR Not Detected Not Detected    Bordetella pertussis by PCR Not Detected Not Detected    Chlamydophilia pneumoniae by PCR Not Detected Not Detected    Coronavirus 229E by PCR Not Detected Not Detected    Coronavirus HKU1 by PCR Not Detected Not Detected    Coronavirus NL63 by PCR Not Detected Not Detected    Coronavirus OC43 by PCR Not Detected Not Detected    SARS-CoV-2, PCR DETECTED (A) Not Detected    Human Metapneumovirus by PCR Not Detected Not Detected    Human Rhinovirus/Enterovirus by PCR Not Detected Not Detected    Influenza A by PCR Not Detected Not Detected    Influenza B by PCR Not Detected Not Detected    Mycoplasma pneumoniae by PCR Not Detected Not Detected    Parainfluenza Virus 1 by PCR Not Detected Not Detected    Parainfluenza Virus 2 by PCR Not Detected Not Detected Parainfluenza Virus 3 by PCR Not Detected Not Detected    Parainfluenza Virus 4 by PCR Not Detected Not Detected    Respiratory Syncytial Virus by PCR Not Detected Not Detected   Basic Metabolic Panel w/ Reflex to MG   Result Value Ref Range    Sodium 159 (H) 132 - 146 mmol/L    Potassium reflex Magnesium 4.8 3.5 - 5.0 mmol/L    Chloride 116 (H) 98 - 107 mmol/L    CO2 20 (L) 22 - 29 mmol/L    Anion Gap 23 (H) 7 - 16 mmol/L    Glucose 174 (H) 74 - 99 mg/dL     (H) 8 - 23 mg/dL    CREATININE 4.9 (H) 0.7 - 1.2 mg/dL    GFR Non-African American 12 >=60 mL/min/1.73    GFR African American 15     Calcium 10.3 (H) 8.6 - 10.2 mg/dL   CBC Auto Differential   Result Value Ref Range    WBC 33.4 (H) 4.5 - 11.5 E9/L    RBC 5.62 3.80 - 5.80 E12/L    Hemoglobin 16.4 12.5 - 16.5 g/dL    Hematocrit 51.4 37.0 - 54.0 %    MCV 91.5 80.0 - 99.9 fL    MCH 29.2 26.0 - 35.0 pg    MCHC 31.9 (L) 32.0 - 34.5 %    RDW 13.5 11.5 - 15.0 fL    Platelets 797 902 - 079 E9/L    MPV 12.9 (H) 7.0 - 12.0 fL    Neutrophils % 89.8 (H) 43.0 - 80.0 %    Immature Granulocytes % 1.4 0.0 - 5.0 %    Lymphocytes % 3.7 (L) 20.0 - 42.0 %    Monocytes % 4.7 2.0 - 12.0 %    Eosinophils % 0.0 0.0 - 6.0 %    Basophils % 0.4 0.0 - 2.0 %    Neutrophils Absolute 29.98 (H) 1.80 - 7.30 E9/L    Immature Granulocytes # 0.46 E9/L    Lymphocytes Absolute 1.24 (L) 1.50 - 4.00 E9/L    Monocytes Absolute 1.57 (H) 0.10 - 0.95 E9/L    Eosinophils Absolute 0.01 (L) 0.05 - 0.50 E9/L    Basophils Absolute 0.15 0.00 - 0.20 E9/L    Anisocytosis 1+     Polychromasia 1+     Poikilocytes 1+     Sharon Cells 1+     Ovalocytes 1+    Troponin   Result Value Ref Range    Troponin 0.10 (H) 0.00 - 0.03 ng/mL   Brain Natriuretic Peptide   Result Value Ref Range    Pro-BNP 1,385 (H) 0 - 125 pg/mL   Urinalysis, reflex to microscopic   Result Value Ref Range    Color, UA DARK YELLOW (A) Straw/Yellow    Clarity, UA Clear Clear    Glucose, Ur 100 (A) Negative mg/dL Bilirubin Urine MODERATE (A) Negative    Ketones, Urine TRACE (A) Negative mg/dL    Specific Gravity, UA >=1.030 1.005 - 1.030    Blood, Urine Negative Negative    pH, UA 5.0 5.0 - 9.0    Protein, UA TRACE Negative mg/dL    Urobilinogen, Urine 1.0 <2.0 E.U./dL    Nitrite, Urine POSITIVE (A) Negative    Leukocyte Esterase, Urine TRACE (A) Negative   Lactate, Sepsis   Result Value Ref Range    Lactic Acid, Sepsis 3.0 (H) 0.5 - 1.9 mmol/L   Lactate, Sepsis   Result Value Ref Range    Lactic Acid, Sepsis 3.2 (H) 0.5 - 1.9 mmol/L   Protime-INR   Result Value Ref Range    Protime 14.6 (H) 9.3 - 12.4 sec    INR 1.3    APTT   Result Value Ref Range    aPTT 28.2 24.5 - 35.1 sec   D-Dimer, Quantitative   Result Value Ref Range    D-Dimer, Quant 3483 ng/mL DDU   Sedimentation Rate   Result Value Ref Range    Sed Rate 24 (H) 0 - 15 mm/Hr   C-Reactive Protein   Result Value Ref Range    CRP 11.7 (H) 0.0 - 0.4 mg/dL   Procalcitonin   Result Value Ref Range    Procalcitonin 1.26 (H) 0.00 - 0.08 ng/mL   Blood Gas, Arterial   Result Value Ref Range    Date Analyzed [id number]     Time Analyzed 0350     Source: Blood Arterial     pH, Blood Gas 7.479 (H) 7.350 - 7.450    PCO2 30.9 (L) 35.0 - 45.0 mmHg    PO2 61.1 (L) 75.0 - 100.0 mmHg    HCO3 22.4 22.0 - 26.0 mmol/L    B.E. 0.2 -3.0 - 3.0 mmol/L    O2 Sat 91.7 (L) 92.0 - 98.5 %    O2Hb 91.4 (L) 94.0 - 97.0 %    COHb 0.1 0.0 - 1.5 %    MetHb 0.2 0.0 - 1.5 %    O2 Content 22.2 mL/dL    HHb 8.3 (H) 0.0 - 5.0 %    tHb (est) 17.3 (H) 11.5 - 16.5 g/dL    Mode NRB 15L     Date Of Collection      Time Collected      Pt Temp 37.0 C     ID [id number]     Lab [id number]     Critical(s) Notified .  No Critical Values    Microscopic Urinalysis   Result Value Ref Range    Hyaline Casts, UA 0-2 0 - 2 /LPF    WBC, UA 0-1 0 - 5 /HPF    RBC, UA NONE 0 - 2 /HPF    Epithelial Cells, UA NONE SEEN /HPF    Bacteria, UA MODERATE (A) None Seen /HPF   Blood Gas, Arterial   Result Value Ref Range Date Analyzed [id number]     Time Analyzed 0556     Source: Blood Arterial     pH, Blood Gas 7.403 7.350 - 7.450    PCO2 33.5 (L) 35.0 - 45.0 mmHg    PO2 78.4 75.0 - 100.0 mmHg    HCO3 20.4 (L) 22.0 - 26.0 mmol/L    B.E. -3.3 (L) -3.0 - 3.0 mmol/L    O2 Sat 95.3 92.0 - 98.5 %    PO2/FIO2 0.78 mmHg/%    AaDO2 576.1 mmHg    RI(T) 735 %    O2Hb 94.7 94.0 - 97.0 %    COHb 0.3 0.0 - 1.5 %    MetHb 0.3 0.0 - 1.5 %    O2 Content 22.8 mL/dL    HHb 4.7 0.0 - 5.0 %    tHb (est) 17.1 (H) 11.5 - 16.5 g/dL    Potassium 4.34 3.50 - 5.00 mmol/L    Mode HHFNC 60L     FIO2 100.0 %    Date Of Collection      Time Collected      Pt Temp 37.0 C     ID [id number]     Lab 11161     Critical(s) Notified .  No Critical Values    Comprehensive Metabolic Panel w/ Reflex to MG   Result Value Ref Range    Sodium 160 (H) 132 - 146 mmol/L    Potassium reflex Magnesium 5.2 (H) 3.5 - 5.0 mmol/L    Chloride 119 (H) 98 - 107 mmol/L    CO2 24 22 - 29 mmol/L    Anion Gap 17 (H) 7 - 16 mmol/L    Glucose 154 (H) 74 - 99 mg/dL     (H) 8 - 23 mg/dL    CREATININE 5.3 (H) 0.7 - 1.2 mg/dL    GFR Non-African American 11 >=60 mL/min/1.73    GFR African American 13     Calcium 9.6 8.6 - 10.2 mg/dL    Total Protein 6.7 6.4 - 8.3 g/dL    Alb 3.0 (L) 3.5 - 5.2 g/dL    Total Bilirubin 1.8 (H) 0.0 - 1.2 mg/dL    Alkaline Phosphatase 290 (H) 40 - 129 U/L     (H) 0 - 40 U/L    AST 81 (H) 0 - 39 U/L   CBC Auto Differential   Result Value Ref Range    WBC 32.2 (H) 4.5 - 11.5 E9/L    RBC 5.11 3.80 - 5.80 E12/L    Hemoglobin 15.2 12.5 - 16.5 g/dL    Hematocrit 48.7 37.0 - 54.0 %    MCV 95.3 80.0 - 99.9 fL    MCH 29.7 26.0 - 35.0 pg    MCHC 31.2 (L) 32.0 - 34.5 %    RDW 13.6 11.5 - 15.0 fL    Platelets 156 638 - 820 E9/L    MPV 12.4 (H) 7.0 - 12.0 fL    Neutrophils % 90.7 (H) 43.0 - 80.0 %    Immature Granulocytes % 1.1 0.0 - 5.0 %    Lymphocytes % 3.0 (L) 20.0 - 42.0 %    Monocytes % 4.9 2.0 - 12.0 %    Eosinophils % 0.0 0.0 - 6.0 % Basophils % 0.3 0.0 - 2.0 %    Neutrophils Absolute 29.24 (H) 1.80 - 7.30 E9/L    Immature Granulocytes # 0.34 E9/L    Lymphocytes Absolute 0.98 (L) 1.50 - 4.00 E9/L    Monocytes Absolute 1.59 (H) 0.10 - 0.95 E9/L    Eosinophils Absolute 0.00 (L) 0.05 - 0.50 E9/L    Basophils Absolute 0.09 0.00 - 0.20 E9/L    Anisocytosis 1+     Polychromasia 1+     Poikilocytes 1+     [place] Cells 1+    Fibrinogen   Result Value Ref Range    Fibrinogen >700 (H) 225 - 540 mg/dL   D-Dimer, Quantitative   Result Value Ref Range    D-Dimer, Quant 2552 ng/mL DDU   Procalcitonin   Result Value Ref Range    Procalcitonin 1.46 (H) 0.00 - 0.08 ng/mL   Basic Metabolic Panel w/ Reflex to MG   Result Value Ref Range    Sodium 156 (H) 132 - 146 mmol/L    Potassium reflex Magnesium 4.8 3.5 - 5.0 mmol/L    Chloride 116 (H) 98 - 107 mmol/L    CO2 22 22 - 29 mmol/L    Anion Gap 18 (H) 7 - 16 mmol/L    Glucose 243 (H) 74 - 99 mg/dL     (H) 8 - 23 mg/dL    CREATININE 5.5 (H) 0.7 - 1.2 mg/dL    GFR Non-African American 11 >=60 mL/min/1.73    GFR African American 13     Calcium 9.4 8.6 - 10.2 mg/dL   Ferritin   Result Value Ref Range    Ferritin 1,489 ng/mL   Lactate dehydrogenase   Result Value Ref Range     (H) 135 - 225 U/L   Urine electrolytes   Result Value Ref Range    Sodium, Ur <20 Not Established mmol/L    Potassium, Ur >100.0 Not Established mmol/L    Chloride <20 Not Established mmol/L   Microalbumin / Creatinine Urine Ratio   Result Value Ref Range    Microalbumin, Random Urine 39.8 (H) Not Established mg/L    Creatinine, Ur 194 40 - 278 mg/dL    Microalbumin Creatinine Ratio 20.5 0.0 - 30.0   Urinalysis   Result Value Ref Range    Color, UA Yellow Straw/Yellow    Clarity, UA Clear Clear    Glucose, Ur 100 (A) Negative mg/dL    Bilirubin Urine MODERATE (A) Negative    Ketones, Urine Negative Negative mg/dL    Specific Gravity, UA >=1.030 1.005 - 1.030    Blood, Urine SMALL (A) Negative    pH, UA 5.0 5.0 - 9.0 Protein, UA TRACE Negative mg/dL    Urobilinogen, Urine 1.0 <2.0 E.U./dL    Nitrite, Urine POSITIVE (A) Negative    Leukocyte Esterase, Urine TRACE (A) Negative   Urea nitrogen, urine   Result Value Ref Range    Urea Nitrogen, Ur 767 (L) 800 - 1666 mg/dL   Microscopic Urinalysis   Result Value Ref Range    WBC, UA 1-3 0 - 5 /HPF    RBC, UA 0-1 0 - 2 /HPF    Bacteria, UA MODERATE (A) None Seen /HPF    Amorphous, UA FEW     Crystals, UA Few (A) None Seen /HPF   EKG 12 Lead   Result Value Ref Range    Ventricular Rate 119 BPM    Atrial Rate 119 BPM    P-R Interval 138 ms    QRS Duration 78 ms    Q-T Interval 340 ms    QTc Calculation (Bazett) 478 ms    P Axis 78 degrees    R Axis 98 degrees    T Axis 80 degrees       RADIOLOGY:  Interpreted by Radiologist.  XR CHEST PORTABLE   Final Result   Left lower lobe pneumonia. CT Head WO Contrast   Final Result   No new abnormal findings. Encephalomalacia on the right as before. CT ABDOMEN PELVIS WO CONTRAST Additional Contrast? None   Final Result   No evidence of urinary tract obstruction or other demonstrable specific cause   of renal failure. See above. CT CHEST WO CONTRAST   Final Result   Left lower lobe pneumonia. Opacity at the right lower lobe is likely   atelectasis. EKG: This EKG is signed and interpreted by the EP. EKG shows sinus tachycardia 119 bpm.  Right atrial enlargement. Right axis deviation. No ST elevations or depressions. No STEMI.    ------------------------- NURSING NOTES AND VITALS REVIEWED ---------------------------   The nursing notes within the ED encounter and vital signs as below have been reviewed by myself. BP 98/65   Pulse 110   Temp 97.1 °F (36.2 °C) (Temporal)   Resp 18   Ht 6' 2\" (1.88 m)   Wt 185 lb (83.9 kg)   SpO2 93%   BMI 23.75 kg/m²   Oxygen Saturation Interpretation: Abnormal and Improved after treatment    The patients available past medical records and past encounters were reviewed. ------------------------------ ED COURSE/MEDICAL DECISION MAKING----------------------  Medications   sodium chloride flush 0.9 % injection 10 mL (has no administration in time range)   sodium chloride flush 0.9 % injection 10 mL (has no administration in time range)   heparin (porcine) injection 5,000 Units (5,000 Units Subcutaneous Given 12/22/20 0658)   Febuxostat TABS 80 mg (0 mg Oral Held 12/22/20 0906)   levothyroxine (SYNTHROID) tablet 50 mcg (0 mcg Oral Held 12/22/20 0907)   aspirin chewable tablet 81 mg (0 mg Oral Held 12/22/20 0906)   bisacodyl (DULCOLAX) suppository 10 mg (has no administration in time range)   magnesium hydroxide (MILK OF MAGNESIA) 400 MG/5ML suspension 30 mL (has no administration in time range)   traMADol (ULTRAM) tablet 50 mg (has no administration in time range)   sertraline (ZOLOFT) tablet 50 mg (0 mg Oral Held 12/22/20 0907)   acetaminophen (TYLENOL) tablet 650 mg (has no administration in time range)     Or   acetaminophen (TYLENOL) suppository 650 mg (has no administration in time range)   dexamethasone (DECADRON) tablet 6 mg (has no administration in time range)   Vitamin D (CHOLECALCIFEROL) tablet 2,000 Units (0 Units Oral Held 12/22/20 0907)   cefepime (MAXIPIME) 1 g IVPB extended (mini-bag) (0 g Intravenous Stopped 12/22/20 1321)     And   0.9 % sodium chloride infusion admixture (has no administration in time range)   vancomycin (VANCOCIN) intermittent dosing (placeholder) (has no administration in time range)   0.45 % sodium chloride infusion ( Intravenous New Bag 12/22/20 1451)   tocilizumab (ACTEMRA) 400 mg in sodium chloride 0.9 % 100 mL IVPB (has no administration in time range)   acetaminophen (TYLENOL) tablet 650 mg (has no administration in time range)     And   diphenhydrAMINE (BENADRYL) tablet 25 mg (has no administration in time range)   0.9 % sodium chloride bolus (0 mLs Intravenous Stopped 12/22/20 0646) cefTRIAXone (ROCEPHIN) 1 g in sterile water 10 mL IV syringe (0 g Intravenous Stopped 12/22/20 0518)   dexamethasone (DECADRON) injection 6 mg (6 mg Intravenous Given 12/22/20 0604)   0.9 % sodium chloride bolus (0 mLs Intravenous Stopped 12/22/20 1557)   vancomycin (VANCOCIN) 1,750 mg in dextrose 5 % 500 mL IVPB (0 mg Intravenous Stopped 12/22/20 1642)         ED COURSE:       Medical Decision Making: This is a 64-year-old male who presented to the emergency department for altered mental status. Upon arrival to the ED the patient was noted to be hypoxic. Patient originally placed on a nonrebreather with improvement of his hypoxia but patient's CO2 remains low on ABG therefore the patient was placed on OptiFlow. Patient's laboratory work-up showed a leukocytosis of 32.2. Patient's chemistry showed hyponatremia as well as an acute kidney injury. Patient's lactic acid elevated. Urinalysis consistent with UTI. Patient's chest x-ray concerning for pneumonia. Patient was Covid positive. Patient started on IV antibiotics. And IV Decadron here in the emergency department. Patient started on IV fluids for her. His hypernatremia. Patient has remained stable on OptiFlow therefore the patient will be admitted to the hospital in intermediate floor. Case discussed with Dr. Anneliese Stuart who is accepted the patient for admission. I, Dr. Tiffanie Hogue, am the primary provider for this encounter    Critical care: 32 minutes    This patient's ED course included: a personal history and physicial examination, re-evaluation prior to disposition, multiple bedside re-evaluations, IV medications, cardiac monitoring, continuous pulse oximetry and complex medical decision making and emergency management    This patient has remained hemodynamically stable during their ED course. Re-Evaluations:             Re-evaluation.   Patients symptoms are improving      Counseling: The emergency provider has spoken with the patient and discussed todays results, in addition to providing specific details for the plan of care and counseling regarding the diagnosis and prognosis. Questions are answered at this time and they are agreeable with the plan.       --------------------------------- IMPRESSION AND DISPOSITION ---------------------------------    IMPRESSION  1. Altered mental status, unspecified altered mental status type    2. Septicemia ([facility] [address]    3. COVID-19    4. AGUSTIN (acute kidney injury) ([facility] [address]    5. Acute cystitis without hematuria    6. Respiratory failure with hypoxia  7. Hypernatremia    DISPOSITION  Disposition: Admit to telemetry  Patient condition is stable    NOTE: This report was transcribed using voice recognition software.  Every effort was made to ensure accuracy; however, inadvertent computerized transcription errors may be present        Catrachita Monterroso DO  12/22/20 2013

## 2020-12-23 NOTE — PROGRESS NOTES
Subjective:    Chief complaint:    Awake  Not really conversive      Objective:    /68   Pulse 99   Temp 96.2 °F (35.7 °C) (Temporal)   Resp 24   Ht 6' 2\" (1.88 m)   Wt 185 lb (83.9 kg)   SpO2 96%   BMI 23.75 kg/m²   General : Awake, lethargic  Heart:  RRR, no murmurs, gallops, or rubs.   Lungs:  CTA bilaterally, no wheeze, rales or rhonchi  Abd: bowel sounds present, nontender, nondistended, no masses  Extrem:  No clubbing, cyanosis, or edema    CBC:   Lab Results   Component Value Date    WBC 32.2 12/22/2020    RBC 5.11 12/22/2020    HGB 15.2 12/22/2020    HCT 48.7 12/22/2020    MCV 95.3 12/22/2020    MCH 29.7 12/22/2020    MCHC 31.2 12/22/2020    RDW 13.6 12/22/2020     12/22/2020    MPV 12.4 12/22/2020     BMP:    Lab Results   Component Value Date     12/23/2020    K 4.2 12/23/2020    K 4.8 12/22/2020     12/23/2020    CO2 22 12/23/2020     12/23/2020    LABALBU 3.0 12/22/2020    LABALBU 4.8 04/27/2012    CREATININE 5.5 12/23/2020    CALCIUM 8.8 12/23/2020    GFRAA 13 12/23/2020    LABGLOM 11 12/23/2020    GLUCOSE 129 12/23/2020    GLUCOSE 112 04/27/2012     PT/INR:    Lab Results   Component Value Date    PROTIME 14.6 12/22/2020    INR 1.3 12/22/2020     Troponin:    Lab Results   Component Value Date    TROPONINI 0.10 12/22/2020       Recent Labs     12/22/20  0403   LABURIN Growth not present, incubation continues     Recent Labs     12/22/20  0508   BC 24 Hours no growth     Recent Labs     12/22/20  0508   BLOODCULT2 24 Hours no growth         Current Facility-Administered Medications:     dextrose 5 % solution, , Intravenous, Continuous, Elmira Hoover MD, Last Rate: 100 mL/hr at 12/23/20 1343, New Bag at 12/23/20 1343    heparin flush 100 UNIT/ML injection 100 Units, 100 Units, Intracatheter, PRN, Clement Morgan MD, 100 Units at 12/23/20 3004   mineral oil-hydrophilic petrolatum (HYDROPHOR) ointment, , Topical, BID, Given at 12/23/20 1523 **AND** mineral oil-hydrophilic petrolatum (HYDROPHOR) ointment, , Topical, TID PRN, To Bowser MD  Charlotte Me  [START ON 12/24/2020] cefepime (MAXIPIME) 1 g IVPB extended (mini-bag), 1 g, Intravenous, Q24H **AND** [START ON 12/24/2020] 0.9 % sodium chloride infusion admixture, , Intravenous, Q24H, Cristobal Prince MD    sodium chloride flush 0.9 % injection 10 mL, 10 mL, Intravenous, 2 times per day, To Bowser MD, 10 mL at 12/23/20 0954    sodium chloride flush 0.9 % injection 10 mL, 10 mL, Intravenous, PRN, To Bowser MD    heparin (porcine) injection 5,000 Units, 5,000 Units, Subcutaneous, 3 times per day, To Bowser MD, 5,000 Units at 12/23/20 1350    levothyroxine (SYNTHROID) tablet 50 mcg, 50 mcg, Oral, Daily, To Bowser MD, Stopped at 12/22/20 0907    aspirin chewable tablet 81 mg, 81 mg, Oral, Daily, To Bowser MD, Stopped at 12/22/20 0906    bisacodyl (DULCOLAX) suppository 10 mg, 10 mg, Rectal, Daily PRN, To Bowser MD    magnesium hydroxide (MILK OF MAGNESIA) 400 MG/5ML suspension 30 mL, 30 mL, Oral, Daily PRN, To Bowser MD    traMADol (ULTRAM) tablet 50 mg, 50 mg, Oral, Q4H PRN, To Bowser MD    sertraline (ZOLOFT) tablet 50 mg, 50 mg, Oral, Daily, To Bowser MD, Stopped at 12/22/20 0907    acetaminophen (TYLENOL) tablet 650 mg, 650 mg, Oral, Q6H PRN **OR** acetaminophen (TYLENOL) suppository 650 mg, 650 mg, Rectal, Q6H PRN, To Bowser MD    dexamethasone (DECADRON) tablet 6 mg, 6 mg, Oral, Daily, To Bowser MD    Vitamin D (CHOLECALCIFEROL) tablet 2,000 Units, 2,000 Units, Oral, Daily, To Bowser MD, Stopped at 12/22/20 0907    vancomycin (VANCOCIN) intermittent dosing (placeholder), , Other, RX Placeholder, To Bowser MD    DIET CARDIAC;

## 2020-12-23 NOTE — PROGRESS NOTES
Messaged  to see if he would like a palliative consult placed to review goals of care and code status with patient and spouse.

## 2020-12-23 NOTE — CARE COORDINATION
Pt from [facility], per Enid Kidd ([place]) pt is not a bed hold, will accept back if bed. Updated Enid Kidd on poor status. Wife in room, met with wife offered support, wife awaiting the attending physician to get update and make decision on code status. Pt currently on 13LO2 at 97%, po decadron daily, iv maxipime q12, and vanco.  Envelope and ambulance form in soft chart. Augustine OLIVER

## 2020-12-23 NOTE — PROGRESS NOTES
Patient's wife, Jose Boyce called in for an update. Update given. She wants to be able to come see patient to say \"good bye\". Charge nurse updated and Jose Boyce will be called back.

## 2020-12-23 NOTE — FLOWSHEET NOTE
Inpatient Wound Care (Initial consult) [id number]    Admit Date: 12/22/2020  3:15 AM    Reason for consult:  Wounds    Significant history: Per H&P     HISTORY OF PRESENT ILLNESS:       The patient is a 60 y.o. male from a local nursing home presented to the emergency room with EMS. Patient apparently was also complaining of shortness of breath. In emergency room patient was noted to be in acute kidney injury. He also had lactic acidosis. CT angiogram of the chest revealed left lower lobe infiltrates. He was also Covid positive. Patient was then admitted. He was still in the emergency room. He is barely responsive. He was covered with empiric antibiotics. He was also noted to have transaminitis. Elevated procalcitonin also noted. Hypernatremia also noted.     Findings:       12/23/20 1321   Skin Integrity   Skin Integrity   (redness, blanchable)   Location   (bilateral buttocks, right shoulder)   Skin Integrity Site 2   Skin Integrity Location 2   (dryness, scaly)   Location 2   (bilateral feet and heels)   Skin Integrity Site 3   Skin Integrity Location 3 Blister    Location 3 BUE   Wound 12/22/20 Buttocks   Date First Assessed/Time First Assessed: 12/22/20 1856   Present on Hospital Admission: Yes  Location: Buttocks   Wound Image    Wound Etiology Pressure Stage  3   Dressing/Treatment Pharmaceutical agent (see MAR)   Wound Length (cm) 1.2 cm   Wound Width (cm) 2 cm   Wound Depth (cm) 0.1 cm   Wound Surface Area (cm^2) 2.4 cm^2   Change in Wound Size % (l*w) 94.55   Wound Volume (cm^3) 0.24 cm^3   Wound Healing % 95   Wound Assessment   (red,yellow)   Drainage Amount None   Ileana-wound Assessment Intact   Wound 12/22/20 Back Left;Upper   Date First Assessed/Time First Assessed: 12/22/20 1857   Present on Hospital Admission: Yes  Location: Back  Wound Location Orientation: Left;Upper   Wound Image    Wound Etiology Deep tissue/Injury   Dressing/Treatment Foam   Wound Length (cm) 1.5 cm Wound Width (cm) 0.8 cm   Wound Depth (cm)   (unable to determine)   Wound Surface Area (cm^2) 1.2 cm^2   Change in Wound Size % (l*w) 46.67   Wound Assessment   (purple, yellow, tan)   Drainage Amount None   Ileana-wound Assessment Intact   Wound 12/22/20 Elbow Left   Date First Assessed/Time First Assessed: 12/22/20 2240   Present on Hospital Admission: Yes  Location: (!) Elbow  Wound Location Orientation: Left   Wound Image    Wound Etiology Pressure Unstageable   Dressing/Treatment Foam   Wound Length (cm) 1 cm   Wound Width (cm) 1.4 cm   Wound Depth (cm)   (unable to determine)   Wound Surface Area (cm^2) 1.4 cm^2   Wound Assessment   (brown, pink, yellow)   Drainage Amount None   Ileana-wound Assessment Intact   Wound 12/23/20 Ear Left;Posterior;Proximal   Date First Assessed/Time First Assessed: 12/23/20 1328   Present on Hospital Admission: Yes  Location: Ear  Wound Location Orientation: Left;Posterior;Proximal   Wound Image    Wound Etiology Deep tissue/Injury   Dressing/Treatment   ( O2  foam ear protector)   Wound Length (cm) 0.6 cm   Wound Width (cm) 0.3 cm   Wound Depth (cm)   (unable to determine)   Wound Surface Area (cm^2) 0.18 cm^2   Wound Assessment Purple/maroon   Drainage Amount None   Ileana-wound Assessment Intact   Wound 12/23/20 Elbow Left;Medial   Date First Assessed/Time First Assessed: 12/23/20 1330   Present on Hospital Admission: Yes  Location: (!) Elbow  Wound Location Orientation: Left;Medial   Wound Image    Wound Etiology Deep tissue/Injury   Dressing/Treatment Open to air   Wound Length (cm) 0.6 cm   Wound Width (cm) 0.4 cm   Wound Depth (cm)   (unable to determine)   Wound Surface Area (cm^2) 0.24 cm^2   Wound Assessment   (purple, red)   Drainage Amount None   Ileana-wound Assessment Non-blanchable erythema     **Informed Consent** photos taken of wounds and inserted into their chart as part of their permanent medical record for purposes of documentation, treatment management and/or medical review. All Images taken on 12/23/20 of patient name: Adalgisa Kyle were transmitted and stored on secured [employer] [place] located within [facility] by a registered [employer] Mobile Application Device. Impression:  Left back, left ear, left medial elbow: DTI  Coccyx: stage 3  Left elbow: unstageable    Plan: aquaphor  mepilex  O2 tubing foam ear protectors  Comfort glide  Heel protectors  Patient will need continued preventative care.       Kandy Rodriguez 12/23/2020 1:34 PM

## 2020-12-23 NOTE — PLAN OF CARE
Problem: Falls - Risk of:  Goal: Will remain free from falls  Description: Will remain free from falls  Outcome: Met This Shift     Problem: Falls - Risk of:  Goal: Absence of physical injury  Description: Absence of physical injury  Outcome: Met This Shift     Problem: Airway Clearance - Ineffective  Goal: Achieve or maintain patent airway  Outcome: Met This Shift     Problem: Gas Exchange - Impaired  Goal: Absence of hypoxia  Outcome: Met This Shift     Problem:  Body Temperature -  Risk of, Imbalanced  Goal: Will regain or maintain usual level of consciousness  Outcome: Met This Shift     Problem: Isolation Precautions - Risk of Spread of Infection  Goal: Prevent transmission of infection  Outcome: Met This Shift     Problem: Risk for Fluid Volume Deficit  Goal: Maintain normal heart rhythm  Outcome: Met This Shift     Problem: Injury - Risk of, Physical Injury:  Goal: Will remain free from falls  Description: Will remain free from falls  Outcome: Met This Shift     Problem: Injury - Risk of, Physical Injury:  Goal: Absence of physical injury  Description: Absence of physical injury  Outcome: Met This Shift     Problem: Nutrition Deficits  Goal: Optimize nutrtional status  Outcome: Not Met This Shift

## 2020-12-23 NOTE — CONSULTS
[facility]  Department of Internal Medicine  Division of Pulmonary, Critical Care and Sleep Medicine  Consult Note    Neha Green DO, Allan Brady MD, [facility]    Patient: Avani Velásquez  MRN: [id number]  : 1955    Encounter Time: 3:03 PM     Date of Admission: 2020  3:15 AM    Primary Care Physician: Radha Rojas MD    Reason for Consultation: COVID and Sepsis from HCAP     HISTORY OF PRESENT ILLNESS : Avani Velásquez 60 y.o. male was seen in consultation regarding the above chief compliant. Avani Velásquez is a 60 y.o. 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 He had a recent hospitalization -12/3/2020 for sepsis and severe malnutrition. He had recently had a stroke and was at a rehab, where he suffered a fall with subsequent left shoulder pain and went to ER for evaluation and was found to have a possible uti with sepsis. Urine culture revealed Enterococcus faecalis. During that hospitalization, he pulled out his NG tube, a video swallow was completed, of which he passed. He was sent to the ER this am, 2020, with a change in mental status and shortness of breath. There was no fever. Pt was dehydrated. Significant labs included:  Wbc 32, Cr 5.3, Na 160, AST 81, , procalcitonin 1.46, and lactic acid 3.2. He was treated with ivf, vanc, ceftriaxone, and cefepime. SARS/ CoV2 +positive . Pt is non verbal and non communicating . CTA chest LLL infiltrates started onPt was given vancomycin, cefepime , ceftriaxone ? Pulmonary consulted    PAST MEDICAL HISTORY:  has a past medical history of Allergic rhinitis, Cancer ([facility] [address] Gout, and Thyroid disease. SURGICAL HISTORY:  has a past surgical history that includes back surgery; tumor excision; and IR MECHANICAL ART THROMBECTOMY INTRACRANIAL (10/23/2020). SOCIAL HISTORY:  reports that he has never smoked. He has never used smokeless tobacco. He reports that he does not drink alcohol or use drugs. FAMILY  HISTORY: family history includes Breast Cancer in his mother; Coronary Art Dis in his father; Mult Sclerosis in his sister. MEDICATIONS:    Prior to Admission medications    Medication Sig Start Date End Date Taking? Authorizing Provider   acetaminophen (TYLENOL) 325 MG tablet Take 650 mg by mouth every 4 hours as needed for Pain or Fever   Yes Historical Provider, MD   mirtazapine (REMERON) 30 MG tablet Take 30 mg by mouth nightly   Yes Historical Provider, MD   aspirin 81 MG chewable tablet Take 81 mg by mouth daily   Yes Historical Provider, MD   bisacodyl (DULCOLAX) 10 MG suppository Place 10 mg rectally daily as needed for Constipation   Yes Historical Provider, MD   magnesium hydroxide (MILK OF MAGNESIA) 400 MG/5ML suspension Take 30 mLs by mouth daily as needed for Constipation   Yes Historical Provider, MD   traMADol (ULTRAM) 50 MG tablet Take 50 mg by mouth every 4 hours as needed for Pain. Yes Historical Provider, MD   sertraline (ZOLOFT) 50 MG tablet Take 50 mg by mouth daily    Yes Historical Provider, MD   Febuxostat 80 MG TABS Take 80 mg by mouth daily   Yes Historical Provider, MD   levothyroxine (SYNTHROID) 50 MCG tablet Take 50 mcg by mouth Daily   Yes Historical Provider, MD   valACYclovir (VALTREX) 1 g tablet Take 1,000 mg by mouth daily   Yes Historical Provider, MD       ALLERGIES: Bactrim [sulfamethoxazole-trimethoprim]       REVIEW OF SYSTEMS:  Otherwise negative if not reported or listed below  Constitutional:  No unanticipated weight loss. No change in sleep pattern or activity. No fevers, chills or rigors. Eyes:    No visual changes or diplopia. No scleral icterus. ENT:    No Headaches, hearing loss or vertigo. No mouth sores or sore throat. Cardiovascular:  + chest discomfort, palpitations. Respiratory:  + cough, No wheezing      No sputum production. No hemoptysis, pleuritic pain. Gastrointestinal:  No abdominal pain, appetite loss, blood in stools. No hematemesis  Genitourinary:  No dysuria, trouble voiding or hematuria. No nocturia. Musculoskeletal:   No weakness or joint complaints. Integumentary: No rashes or pruritis. Neurological:  No headache, numbness or tingling. Psychiatric:   No effect on mood, memory, mentation, or  behavior. No anxiety or depression. Endocrine:   No excessive thirst, fluid intake, or urination. No tremor. Hematologic: No abnormal bruising or bleeding. Lymphatic:  No swollen lymph nodes. Immunologic:  No hives or hx of anaphaxsis. OBJECTIVE:     PHYSICAL EXAM:   VITALS:     Intake/Output Summary (Last 24 hours) at 12/23/2020 1503  Last data filed at 12/23/2020 1411  Gross per 24 hour   Intake 10 ml   Output 675 ml   Net -665 ml        CONSTITUTIONAL:   A&O x 3, NAD  SKIN:     No rash, No suspicious lesions or skin discoloration  HEENT:     EOMI, MMM, No thrush  NECK:    No bruits, No JVP apprechiated  CV:      Sinus,  No murmur, No rubs, No gallops  PULMONARY:   Couse BS,  No Wheezing, No Rales, No Rhonchi      No noted egophony  ABDOMEN:     Soft, non-tender. BS normal. No R/R/G  EXT:    No deformities . No clubbing.       + lower extremity edema, No venous stasis  PULSE:   Appears equal and palpable.   PSYCHIATRIC:  Seems appropriate, No acute psycosis  MS:    No fractures, No gross weakness  NEUROLOGIC:   The clinical assessment is non-focal     DATA: IMAGING & TESTING:     LABORATORY TESTS:    CBC:   Lab Results   Component Value Date    WBC 32.2 12/22/2020    RBC 5.11 12/22/2020    HGB 15.2 12/22/2020    HCT 48.7 12/22/2020    MCV 95.3 12/22/2020    MCH 29.7 12/22/2020    MCHC 31.2 12/22/2020    RDW 13.6 12/22/2020     12/22/2020    MPV 12.4 12/22/2020     CMP:    Lab Results   Component Value Date     12/23/2020 K 5.0 12/23/2020    K 4.8 12/22/2020     12/23/2020    CO2 23 12/23/2020     12/23/2020    CREATININE 6.0 12/23/2020    GFRAA 11 12/23/2020    LABGLOM 10 12/23/2020    GLUCOSE 140 12/23/2020    GLUCOSE 112 04/27/2012    PROT 6.7 12/22/2020    LABALBU 3.0 12/22/2020    LABALBU 4.8 04/27/2012    CALCIUM 9.2 12/23/2020    BILITOT 1.8 12/22/2020    ALKPHOS 290 12/22/2020    AST 81 12/22/2020     12/22/2020     TSH:    Lab Results   Component Value Date    TSH 0.958 10/29/2020     FERRITIN:    Lab Results   Component Value Date    FERRITIN 1,489 12/22/2020     Fibrinogen Level:  No components found for: FIB     PRO-BNP:   Lab Results   Component Value Date    PROBNP 1,385 (H) 12/22/2020    PROBNP 120 11/30/2020      ABGs:   Lab Results   Component Value Date    PH 7.403 12/22/2020    PO2 78.4 12/22/2020    PCO2 33.5 12/22/2020     Hemoglobin A1C: No components found for: HGBA1C    IMAGING:  Imaging tests were completed and reviewed and discussed radiology and care team involved and reveals     Ct Head Wo Contrast    Result Date: 12/22/2020  EXAMINATION: CT OF THE HEAD WITHOUT CONTRAST  12/22/2020 3:53 am TECHNIQUE: CT of the head was performed without the administration of intravenous contrast. Dose modulation, iterative reconstruction, and/or weight based adjustment of the mA/kV was utilized to reduce the radiation dose to as low as reasonably achievable. COMPARISON: None. HISTORY: ORDERING SYSTEM PROVIDED HISTORY: AMS TECHNOLOGIST PROVIDED HISTORY: Reason for exam:->AMS Has a \"code stroke\" or \"stroke alert\" been called? ->No What reading provider will be dictating this exam?->CRC FINDINGS: Stable extensive right-sided encephalomalacia likely with some minimal dystrophic calcification. No acute hemorrhage, mass or midline shift. Stable caliber of ventricles and sulci. Unremarkable bony calvarium. No new abnormal findings. Encephalomalacia on the right as before.     Ct Head Wo Contrast EXAMINATION: CT OF THE CHEST WITHOUT CONTRAST 12/22/2020 3:53 am TECHNIQUE: CT of the chest was performed without the administration of intravenous contrast. Multiplanar reformatted images are provided for review. Dose modulation, iterative reconstruction, and/or weight based adjustment of the mA/kV was utilized to reduce the radiation dose to as low as reasonably achievable. COMPARISON: October 7, 2009 HISTORY: ORDERING SYSTEM PROVIDED HISTORY: sob, hypoxia TECHNOLOGIST PROVIDED HISTORY: Reason for exam:->sob, hypoxia What reading provider will be dictating this exam?->CRC FINDINGS: There is left lower lobe pneumonia and there is opacity at the right lower lobe which is most likely atelectasis. Heart size is normal with some coronary artery calcification. The caliber of the thoracic aorta is within normal limits. No mediastinal adenopathy is present. No active process evident in the upper abdomen. Chest wall is unremarkable. Left lower lobe pneumonia. Opacity at the right lower lobe is likely atelectasis. Fl Modified Barium Swallow W Video  Result Date: 12/2/2020  Swallowing dysfunction as described above including deep penetration of thin liquid barium and silent aspiration of nectar. Please see separate speech pathology report for full discussion of findings and recommendations. Assessment:   1. COVID-19 infection,   2. sepsis,   3. HCAP, Left lower lobe pneumonia. 4. hyponatremia,   5. acute kidney injury,   6. Lactic acidosis        Plan:   1. Decadron 6 mg   2. No Remdesivir with Renal failure  3. Tocilizumab to get given (max dose is 800), give 1/2 and look for response  4. His main issue is sepsis not CoVID  5.  ID consulted    Walter Little, MPH, Yasmeen Hebert  Professor of Medicine  Pulmonary, Critical Care and Sleep Medicine

## 2020-12-23 NOTE — CONSULTS
? Code status ; changed to limited code- no CPR; no intubation; no shock; no meds  ? Advanced Directives: no HPOA or Living Will noted in chart  ? Surrogate/Legal NOK: spouse Orly Marie @ [phone]      Details of Conversation:    12/23/20 Met with pt's wife, Daysi Carrera. We talked about pt's decline with poor nutrition; COVID and recent stroke in November. She states they have discussed his wishes in the past and pt was clear in not wanting to be resuscitated. We reviewed options and Daysi Carrera has agreed to limited code- no meds; no intubation; no shock; no CPR but would like to continue to have his infections treated. We talked about tube feedings and she states he has expressed many times of his desire to NOT have a feeding tube. We discussed option of \"pleasure feedings\" which she wants to continue knowing he wouldn't get full nutrition support. We also discussed option of hospice and just comfort measures. At present her goal is to continue treating infections for now to see if his mental status will improve. He does follow commands and she states he is interactive with her. With further decline would consider hospice as an option. Likely will return to [facility] care on d/c. Spoke with staff and unit . Appreciative. Contact information for Palliative medicine provided to wife.      Spiritual assessment: no spiritual distress identified  Bereavement and grief: to be determined  Referrals to: none today    SUBJECTIVE:     Active Hospital Problems    Diagnosis Date Noted    COVID-19 [U07.1] 12/22/2020           OBJECTIVE:   Prognosis: Guarded    Physical Exam:  /83   Pulse 97   Temp 96 °F (35.6 °C) (Temporal)   Resp 19   Ht 6' 2\" (1.88 m)   Wt 185 lb (83.9 kg)   SpO2 97%   BMI 23.75 kg/m²   Gen awake but confused to person/place/situation/time;   HEENT:  Normocephalic, atraumatic, mucosa moist, EOMI  Neck:  Supple, trachea midline, no JVD Lungs: on oxygen; diminished sounds bilaterally; moist/congested  Heart:  NSR/ ST; RRR,   Abd:  Soft, non tender, non distended, bowel sounds present  :  catheter  Ext:  No movement LUE; flaccid, no edema, pulses present  Skin:  Cool/pale  Neuro:  Withdrawn; confused;  following commands    Past Medical History:   Diagnosis Date    Allergic rhinitis     had allergy shots    Cancer ([address]     leukemia    Gout     Thyroid disease      Past Surgical History:   Procedure Laterality Date    BACK SURGERY      [facility]    IR MECHANICAL ART THROMBECTOMY INTRACRANIAL  10/23/2020    IR MECHANICAL ART THROMBECTOMY INTRACRANIAL 10/23/2020 [facility]    TUMOR EXCISION      melanoma on the back       Social History:   The patient currently lives nursing home  TOBACCO:  reports that he has never smoked. He has never used smokeless tobacco.  ETOH:  reports no history of alcohol use. Objective data reviewed: labs, images, records, medication use, vitals and chart    Discussed patient and the plan of care with the other IDT members: Palliative Medicine IDT Team    Time/Communication  Greater than 50% of time spent, total 50 minutes in counseling and coordination of care at the bedside regarding goals of care, symptom management, diagnosis and prognosis and see above. Thank you for allowing Palliative Medicine to participate in the care of Aron Perez.   Marybeth Carr APRN-CNS, ACHPN

## 2020-12-23 NOTE — PROGRESS NOTES
Nephrology Consult Note  Patient's Name: Humaira Huynh  3:37 PM  12/23/2020    Nephrologist: Dr. Jean Litten    Reason for Consult:  AGUSTIN & Hypernatremia  Requesting Physician:  Jose M Walker MD    Chief Complaint:  AMS and SOB    History Obtained From:  past medical records    History of Present Ilness:  Per 12/22/20 Note:  Humaira Huynh is a 60 y.o. 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 He had a recent hospitalization 11/30-12/3/2020 for sepsis and severe malnutrition. He had recently had a stroke and was at a rehab, where he suffered a fall with subsequent left shoulder pain and went to ER for evaluation and was found to have a possible uti with sepsis. Urine culture revealed Enterococcus faecalis. During that hospitalization, he pulled out his NG tube, a video swallow was completed, of which he passed. He was sent to the ER this am, 12/22/2020, with a change in mental status and shortness of breath. There was no fever. Pt was dehydrated. Significant labs included:  Wbc 32, Cr 5.3, Na 160, AST 81, , procalcitonin 1.46, and lactic acid 3.2. He was treated with ivf, vanc, ceftriaxone, and cefepime. SARS/ CoV2 +positive . Vitals upon presentation:  T 96.9, R 22, P 120, BP 98/75, 78% on room air, he was placed on heated high flow oxygen 60 L with FiO2 100. He has been nonverbal and non communicating. 12/23/202: Physical exam not performed d/t +COVID 19 Infection. Palliative care met with wife who wishes no CPR but wants infection treated.      Past Medical History:   Diagnosis Date    Allergic rhinitis     had allergy shots    Cancer ([facility] [address]     leukemia    Gout     Thyroid disease        Past Surgical History:   Procedure Laterality Date    BACK SURGERY      [facility]    IR MECHANICAL ART THROMBECTOMY INTRACRANIAL  10/23/2020 IR MECHANICAL ART THROMBECTOMY INTRACRANIAL 10/23/2020 [facility]    TUMOR EXCISION      melanoma on the back       Family History   Problem Relation Age of Onset    Coronary Art Dis Father         aged 63    Breast Cancer Mother     Mult Sclerosis Sister         reports that he has never smoked. He has never used smokeless tobacco. He reports that he does not drink alcohol or use drugs. Allergies:  Bactrim [sulfamethoxazole-trimethoprim]    Current Medications:        dextrose 5 % solution, Continuous      heparin flush 100 UNIT/ML injection 100 Units, PRN      mineral oil-hydrophilic petrolatum (HYDROPHOR) ointment, BID    And      mineral oil-hydrophilic petrolatum (HYDROPHOR) ointment, TID PRN      [START ON 12/24/2020] cefepime (MAXIPIME) 1 g IVPB extended (mini-bag), Q24H    And      [START ON 12/24/2020] 0.9 % sodium chloride infusion admixture, Q12H      sodium chloride flush 0.9 % injection 10 mL, 2 times per day      sodium chloride flush 0.9 % injection 10 mL, PRN      heparin (porcine) injection 5,000 Units, 3 times per day      levothyroxine (SYNTHROID) tablet 50 mcg, Daily      aspirin chewable tablet 81 mg, Daily      bisacodyl (DULCOLAX) suppository 10 mg, Daily PRN      magnesium hydroxide (MILK OF MAGNESIA) 400 MG/5ML suspension 30 mL, Daily PRN      traMADol (ULTRAM) tablet 50 mg, Q4H PRN      sertraline (ZOLOFT) tablet 50 mg, Daily      acetaminophen (TYLENOL) tablet 650 mg, Q6H PRN    Or      acetaminophen (TYLENOL) suppository 650 mg, Q6H PRN      dexamethasone (DECADRON) tablet 6 mg, Daily      Vitamin D (CHOLECALCIFEROL) tablet 2,000 Units, Daily      vancomycin (VANCOCIN) intermittent dosing (placeholder), RX Placeholder        Review of Systems:   Pertinent items are noted in HPI.     Physical exam:   Constitutional: Vitals: VITALS:  /68   Pulse 99   Temp 96.2 °F (35.7 °C) (Temporal)   Resp 24   Ht 6' 2\" (1.88 m)   Wt 185 lb (83.9 kg)   SpO2 96%   BMI 23.75 kg/m²   24HR INTAKE/OUTPUT:      Intake/Output Summary (Last 24 hours) at 12/23/2020 1537  Last data filed at 12/23/2020 1411  Gross per 24 hour   Intake 10 ml   Output 675 ml   Net -665 ml     URINARY CATHETER OUTPUT ([place]):  Urethral Catheter-Output (mL): 300 mL     PE not performed-pt is COVID-19 +positive.     Data:   Labs:  CBC:   Lab Results   Component Value Date    WBC 32.2 12/22/2020    RBC 5.11 12/22/2020    HGB 15.2 12/22/2020    HCT 48.7 12/22/2020    MCV 95.3 12/22/2020    MCH 29.7 12/22/2020    MCHC 31.2 12/22/2020    RDW 13.6 12/22/2020     12/22/2020    MPV 12.4 12/22/2020     CBC with Differential:    Lab Results   Component Value Date    WBC 32.2 12/22/2020    RBC 5.11 12/22/2020    HGB 15.2 12/22/2020    HCT 48.7 12/22/2020     12/22/2020    MCV 95.3 12/22/2020    MCH 29.7 12/22/2020    MCHC 31.2 12/22/2020    RDW 13.6 12/22/2020    SEGSPCT 21 04/27/2012    LYMPHOPCT 3.0 12/22/2020    MONOPCT 4.9 12/22/2020    BASOPCT 0.3 12/22/2020    MONOSABS 1.59 12/22/2020    LYMPHSABS 0.98 12/22/2020    EOSABS 0.00 12/22/2020    BASOSABS 0.09 12/22/2020     CMP:    Lab Results   Component Value Date     12/23/2020    K 5.0 12/23/2020    K 4.8 12/22/2020     12/23/2020    CO2 23 12/23/2020     12/23/2020    CREATININE 6.0 12/23/2020    GFRAA 11 12/23/2020    LABGLOM 10 12/23/2020    GLUCOSE 140 12/23/2020    GLUCOSE 112 04/27/2012    PROT 6.7 12/22/2020    LABALBU 3.0 12/22/2020    LABALBU 4.8 04/27/2012    CALCIUM 9.2 12/23/2020    BILITOT 1.8 12/22/2020    ALKPHOS 290 12/22/2020    AST 81 12/22/2020     12/22/2020     BMP:    Lab Results   Component Value Date     12/23/2020    K 5.0 12/23/2020    K 4.8 12/22/2020     12/23/2020    CO2 23 12/23/2020     12/23/2020    LABALBU 3.0 12/22/2020 LABALBU 4.8 04/27/2012    CREATININE 6.0 12/23/2020    CALCIUM 9.2 12/23/2020    GFRAA 11 12/23/2020    LABGLOM 10 12/23/2020    GLUCOSE 140 12/23/2020    GLUCOSE 112 04/27/2012     Calcium:    Lab Results   Component Value Date    CALCIUM 9.2 12/23/2020     Ionized Calcium:  No results found for: IONCA  Magnesium:    Lab Results   Component Value Date    MG 1.9 11/30/2020     Phosphorus:    Lab Results   Component Value Date    PHOS 3.9 10/31/2020     LDH:    Lab Results   Component Value Date     12/22/2020     Uric Acid:    Lab Results   Component Value Date    LABURIC 5.4 03/10/2017    URICACID 4.8 07/12/2016     PT/INR:    Lab Results   Component Value Date    PROTIME 14.6 12/22/2020    INR 1.3 12/22/2020     PTT:    Lab Results   Component Value Date    APTT 28.2 12/22/2020   [APTT}  Last 3 Troponin:    Lab Results   Component Value Date    TROPONINI 0.10 12/22/2020    TROPONINI <0.01 11/30/2020     U/A:    Lab Results   Component Value Date    COLORU Yellow 12/22/2020    PROTEINU TRACE 12/22/2020    PHUR 5.0 12/22/2020    WBCUA 1-3 12/22/2020    RBCUA 0-1 12/22/2020    BACTERIA MODERATE 12/22/2020    CLARITYU Clear 12/22/2020    SPECGRAV >=1.030 12/22/2020    LEUKOCYTESUR TRACE 12/22/2020    UROBILINOGEN 1.0 12/22/2020    BILIRUBINUR MODERATE 12/22/2020    BLOODU SMALL 12/22/2020    GLUCOSEU 100 12/22/2020    AMORPHOUS FEW 12/22/2020     ABG:    Lab Results   Component Value Date    PH 7.403 12/22/2020    PCO2 33.5 12/22/2020    PO2 78.4 12/22/2020    HCO3 20.4 12/22/2020    BE -3.3 12/22/2020    O2SAT 95.3 12/22/2020     HgBA1c:    Lab Results   Component Value Date    LABA1C 5.3 10/25/2020     Microalbumen/Creatinine ratio:  No components found for: RUCREAT  FLP:    Lab Results   Component Value Date    TRIG 101 10/25/2020    HDL 48 10/25/2020    LDLCALC 91 10/25/2020    LABVLDL 20 10/25/2020     TSH:    Lab Results   Component Value Date    TSH 0.958 10/29/2020 VITAMIN B12: No components found for: B12  FOLATE:    Lab Results   Component Value Date    FOLATE 19.3 03/16/2015     IRON:  No results found for: IRON  Iron Saturation:  No components found for: PERCENTFE  TIBC:  No results found for: TIBC  FERRITIN:    Lab Results   Component Value Date    FERRITIN 1,489 12/22/2020     RPR:  No results found for: RPR  AMYLASE:  No results found for: AMYLASE  LIPASE:  No results found for: LIPASE  Fibrinogen Level:  No components found for: FIB  Urine Toxicology:  No components found for: IAMMENTA, IBARBIT, IBENZO, ICOCAINE, IMARTHC, IOPIATES, IPHENCYC     Imaging:  EXAMINATION:   CT OF THE HEAD WITHOUT CONTRAST  12/22/2020 3:53 am   TECHNIQUE:   CT of the head was performed without the administration of intravenous   contrast. Dose modulation, iterative reconstruction, and/or weight based   adjustment of the mA/kV was utilized to reduce the radiation dose to as low   as reasonably achievable. COMPARISON:   None. HISTORY:   ORDERING SYSTEM PROVIDED HISTORY: AMS   TECHNOLOGIST PROVIDED HISTORY:   Reason for exam:->AMS   Has a \"code stroke\" or \"stroke alert\" been called? ->No   What reading provider will be dictating this exam?->CRC   FINDINGS:   Stable extensive right-sided encephalomalacia likely with some minimal   dystrophic calcification.  No acute hemorrhage, mass or midline shift. Stable caliber of ventricles and sulci.  Unremarkable bony calvarium.       Impression   No new abnormal findings.  Encephalomalacia on the right as before. EXAMINATION:   CT OF THE CHEST WITHOUT CONTRAST 12/22/2020 3:53 am   TECHNIQUE:   CT of the chest was performed without the administration of intravenous   contrast. Multiplanar reformatted images are provided for review. Dose   modulation, iterative reconstruction, and/or weight based adjustment of the   mA/kV was utilized to reduce the radiation dose to as low as reasonably   achievable.    COMPARISON:   October 7, 2009   HISTORY: ORDERING SYSTEM PROVIDED HISTORY: sob, hypoxia   TECHNOLOGIST PROVIDED HISTORY:   Reason for exam:->sob, hypoxia   What reading provider will be dictating this exam?->CRC   FINDINGS:   There is left lower lobe pneumonia and there is opacity at the right lower   lobe which is most likely atelectasis. Heart size is normal with some coronary artery calcification.  The caliber of   the thoracic aorta is within normal limits.  No mediastinal adenopathy is   present. No active process evident in the upper abdomen.  Chest wall is unremarkable.       Impression   Left lower lobe pneumonia.  Opacity at the right lower lobe is likely   atelectasis. Narrative   EXAMINATION:   CT OF THE ABDOMEN AND PELVIS WITHOUT CONTRAST 12/22/2020 3:53 am   TECHNIQUE:   CT of the abdomen and pelvis was performed without the administration of   intravenous contrast. Multiplanar reformatted images are provided for review. Dose modulation, iterative reconstruction, and/or weight based adjustment of   the mA/kV was utilized to reduce the radiation dose to as low as reasonably   achievable. COMPARISON:   October 7 2009   HISTORY:   ORDERING SYSTEM PROVIDED HISTORY: renal failure, weakness   TECHNOLOGIST PROVIDED HISTORY:   Reason for exam:->renal failure, weakness   Additional Contrast?->None   What reading provider will be dictating this exam?->CRC   FINDINGS:   Lower Chest: Reported separately. Organs: Gallbladder, liver, spleen, pancreas and adrenal glands demonstrate   nothing active. Ronel Cheers is no evidence of hydronephrosis or renal parenchymal   wasting.  No space-occupying renal lesions or calcified stones are evident. GI/Bowel: Normal appendix. Ronel Cheers is sigmoid diverticulosis. Pelvis: Nothing active. Peritoneum/Retroperitoneum: Nothing active. Bones/Soft Tissues: Nothing active.       Impression   No evidence of urinary tract obstruction or other demonstrable specific cause   of renal failure.  See above. EXAMINATION:   ONE XRAY VIEW OF THE CHEST   12/22/2020 3:45 am   COMPARISON:   30 November 2020   HISTORY:   ORDERING SYSTEM PROVIDED HISTORY: ams   TECHNOLOGIST PROVIDED HISTORY:   Reason for exam:->ams   What reading provider will be dictating this exam?->CRC   FINDINGS:   Left lower lobe pneumonia.  Heart and pulmonary vascularity are normal.   Neither costophrenic angle is blunted.       Impression   Left lower lobe pneumonia. Assessment & Plan:   1. AGUSTIN in the setting of dehydration, combined with COVID-19 infection. baseline serum Cr 0.6-0.8 mg/dl with eGFR=>60 ml/min. Serum Cr OA   4.9-->5.3-->5.5 ->6.0  CT scan reveals no hydro, no calculi. FENa - 0.4% supports pre-renal etiology   PVR, MCR - 39.8  Pts wife conveys that pt does not want dialysis  Continue IVF      2. Hypernatremia in the setting of dehydration. Na+160 OA-->156->155  Continue ivf  Monitor Q 12 hrs    3. DM type II  Controlled as evidenced by HgbA1c 5.3 on 10/25/2020  Primary following    4. Leukocytosis  Wbc 32.2  No fever  LLL infiltrates  Received vanc, cefepime, ceftriaxone in ER  ID following. 5. Altered Mental Status  Primary following     6. COVID 19 Infection  On steroids  ID following.        Thank you Dr. Kevin Arenas MD for allowing us to participate in care of Megha Allen,APRN-CNS  4:15 PM  12/23/2020     Pt seen and examined agree with above  Na improving on ivf  Continue ivf and follow arsenio Stein

## 2020-12-23 NOTE — PROGRESS NOTES
Department of Internal Medicine  Infectious Diseases  Progress Note      C/C :   COVID 23 ,pneumonia     Discussed with pt's wife    Pt is awake , no distress  Afebrile      Current Facility-Administered Medications   Medication Dose Route Frequency Provider Last Rate Last Admin    dextrose 5 % solution   Intravenous Continuous Palomo Olivares  mL/hr at 12/23/20 1343 New Bag at 12/23/20 1343    heparin flush 100 UNIT/ML injection 100 Units  100 Units Intracatheter PRN Kun Castaneda MD   100 Units at 12/23/20 1343    mineral oil-hydrophilic petrolatum (HYDROPHOR) ointment   Topical BID Kun Castaneda MD   Given at 12/23/20 1523    And    mineral oil-hydrophilic petrolatum (HYDROPHOR) ointment   Topical TID PRN Kun Castaneda MD        sodium chloride flush 0.9 % injection 10 mL  10 mL Intravenous 2 times per day Kun Castaneda MD   10 mL at 12/23/20 0954    sodium chloride flush 0.9 % injection 10 mL  10 mL Intravenous PRN Kun Castaneda MD        heparin (porcine) injection 5,000 Units  5,000 Units Subcutaneous 3 times per day Kun Castaneda MD   5,000 Units at 12/23/20 1350    levothyroxine (SYNTHROID) tablet 50 mcg  50 mcg Oral Daily Kun Castaneda MD   Stopped at 12/22/20 7036    aspirin chewable tablet 81 mg  81 mg Oral Daily Kun Castaneda MD   Stopped at 12/22/20 2572    bisacodyl (DULCOLAX) suppository 10 mg  10 mg Rectal Daily PRN Kun Castaneda MD        magnesium hydroxide (MILK OF MAGNESIA) 400 MG/5ML suspension 30 mL  30 mL Oral Daily PRN Kun Castaneda MD        traMADol Hamzah Yecenia) tablet 50 mg  50 mg Oral Q4H PRN Kun Castaneda MD        sertraline (ZOLOFT) tablet 50 mg  50 mg Oral Daily Kun Castaneda MD   Stopped at 12/22/20 8314    acetaminophen (TYLENOL) tablet 650 mg  650 mg Oral Q6H PRN Kun Castaneda MD        Or  acetaminophen (TYLENOL) suppository 650 mg  650 mg Rectal Q6H PRN Jose M Walker MD        dexamethasone (DECADRON) tablet 6 mg  6 mg Oral Daily Jose M Walker MD        Vitamin D (CHOLECALCIFEROL) tablet 2,000 Units  2,000 Units Oral Daily Jose M Walker MD   Stopped at 12/22/20 0312    cefepime (MAXIPIME) 1 g IVPB extended (mini-bag)  1 g Intravenous Q12H Jose M Walker MD 12.5 mL/hr at 12/23/20 1253 1 g at 12/23/20 1253    And    0.9 % sodium chloride infusion admixture   Intravenous Q12H Jose M Walker MD        vancomycin (VANCOCIN) intermittent dosing (placeholder)   Other RX Placeholder Alex Perez MD         REVIEW OF SYSTEMS: could not be obtained       PHYSICAL EXAM:      Vitals:     /68   Pulse 99   Temp 96.2 °F (35.7 °C) (Temporal)   Resp 24   Ht 6' 2\" (1.88 m)   Wt 185 lb (83.9 kg)   SpO2 96%   BMI 23.75 kg/m²     General Appearance:     Awake, non communicating,     Head:    Normocephalic, atraumatic   Eyes:    +  pallor, no icterus,   Ears:    No obvious deformity or drainage.    Nose:   No nasal drainage   Throat:   Mucosa dry    Neck:   Supple, no lymphadenopathy   Lungs:     Decreased breath sound,    Heart:    Tachycardic    Abdomen:     Soft,not distended, bowel sounds present    Extremities:   No edema,    Pulses:   Dorsalis pedis palpable    Skin:   no rashes or lesions   CBC with Differential:      Lab Results   Component Value Date    WBC 32.2 12/22/2020    RBC 5.11 12/22/2020    HGB 15.2 12/22/2020    HCT 48.7 12/22/2020     12/22/2020    MCV 95.3 12/22/2020    MCH 29.7 12/22/2020    MCHC 31.2 12/22/2020    RDW 13.6 12/22/2020    SEGSPCT 21 04/27/2012    LYMPHOPCT 3.0 12/22/2020    MONOPCT 4.9 12/22/2020    BASOPCT 0.3 12/22/2020    MONOSABS 1.59 12/22/2020    LYMPHSABS 0.98 12/22/2020    EOSABS 0.00 12/22/2020    BASOSABS 0.09 12/22/2020       CMP     Lab Results   Component Value Date     12/23/2020 K 5.0 12/23/2020    K 4.8 12/22/2020     12/23/2020    CO2 23 12/23/2020     12/23/2020    CREATININE 6.0 12/23/2020    GFRAA 11 12/23/2020    LABGLOM 10 12/23/2020    GLUCOSE 140 12/23/2020    GLUCOSE 112 04/27/2012    PROT 6.7 12/22/2020    LABALBU 3.0 12/22/2020    LABALBU 4.8 04/27/2012    CALCIUM 9.2 12/23/2020    BILITOT 1.8 12/22/2020    ALKPHOS 290 12/22/2020    AST 81 12/22/2020     12/22/2020       Hepatic Function Panel:      Lab Results   Component Value Date    ALKPHOS 290 12/22/2020     12/22/2020    AST 81 12/22/2020    PROT 6.7 12/22/2020    BILITOT 1.8 12/22/2020    LABALBU 3.0 12/22/2020    LABALBU 4.8 04/27/2012       PT/INR:    Lab Results   Component Value Date    PROTIME 14.6 12/22/2020    INR 1.3 12/22/2020       TSH:    Lab Results   Component Value Date    TSH 0.958 10/29/2020       U/A:      Lab Results   Component Value Date    COLORU Yellow 12/22/2020    PHUR 5.0 12/22/2020    WBCUA 1-3 12/22/2020    RBCUA 0-1 12/22/2020    BACTERIA MODERATE 12/22/2020    CLARITYU Clear 12/22/2020    SPECGRAV >=1.030 12/22/2020    LEUKOCYTESUR TRACE 12/22/2020    UROBILINOGEN 1.0 12/22/2020    BILIRUBINUR MODERATE 12/22/2020    BLOODU SMALL 12/22/2020    GLUCOSEU 100 12/22/2020    AMORPHOUS FEW 12/22/2020       ABG:  No results found for: QMN9IQM, BEART, C1ZTNOOH, PHART, THGBART, HPN9XSZ, PO2ART, WRX7TTO    MICROBIOLOGY:    Blood culture - pending     Urine Culture -     SARS-CoV-2, PCR DETECTEDAbnormal        Radiology :      CT scan of chest -  Left lower lobe pneumonia.  Opacity at the right lower lobe is likely   Atelectasis. IMPRESSION:     1. Respiratory failure ,on  13 L of high flow oxygen , saturation 96 %   2. COVID 19 pneumonia ( Severe ) / Pneumonia / Sepsis   3. Leukocytosis, lactic acidosis, elevated procalcitonin   4. Transaminitis       RECOMMENDATIONS:      1. Decadron 6 mg IV q 12 hrs   2.  Remdesivi ( if renal function improves ) 3. Cefepime 1 gram IV q 24 hrs, Vancomycin PRN    4.  Consider hospice ( pt did not want HD as pre pt's wife )

## 2020-12-23 NOTE — PROGRESS NOTES
Comprehensive Nutrition Assessment    Type and Reason for Visit:  Initial, Positive Nutrition Screen    Nutrition Recommendations/Plan: Recommend and start Glucerna supplement BID and Boost Pudding supplement BID to help meet increased nutritional needs and d/t poor po intake of meals. Recommend speech therapy to consult to help determine correct food and fluid consistencies. Nutrition Assessment:  Patient from nursing home at nutritional risk d/t poor po intake x 2 days since admission ; noted poor appetite PTA ; pt at further nutritional compromise AEB increased needs from wound healing ; pt also COVID-19 positive ; noted AGUSTIN and sepsis ; hx of CVA and DM ; questionable hx of dysphagia ; hx of CLL ; will start ONS    Malnutrition Assessment:  Malnutrition Status: At risk for malnutrition (Comment)    Context:  Acute Illness     Findings of the 6 clinical characteristics of malnutrition:  Energy Intake:  Mild decrease in energy intake (Comment)(x 2 days since admission)  Weight Loss:  Unable to assess(d/t only stated weights upon admission)     Body Fat Loss:  Unable to assess(data not available to assess at this time d/t COVID isolation)     Muscle Mass Loss:  Unable to assess(data not available to assess at this time d/t COVID isolation)    Fluid Accumulation:  No significant fluid accumulation     Strength:  Not Performed    Estimated Daily Nutrient Needs:  Energy (kcal):  9302-3174 (REE 1701 x 1.2 SF); Weight Used for Energy Requirements:  Current     Protein (g):  100-120 (1.2-1.4g/kg CBW) (noted AGUSTIN);  Weight Used for Protein Requirements:  Current        Fluid (ml/day):  6556-8674; Method Used for Fluid Requirements:  1 ml/kcal      Nutrition Related Findings:  I&Os WNL, no edema, active BS, SOB, rounded abd, A&O x 0, non-verbal, redness to buttocks/heels      Wounds:  Multiple, Open Wounds, Wound Consult Pending(wounds x 3)       Current Nutrition Therapies:    DIET CARDIAC; Anthropometric Measures:  · Height: 6' 2\" (188 cm)  · Current Body Weight: 185 lb (83.9 kg)(12/22, estimated)   · Admission Body Weight: 185 lb (83.9 kg)(12/22, estimated)    · Usual Body Weight: 207 lb (93.9 kg)(10/23/20, bedscale ; EMR also shows past weight of 184# actual on 11/30/20)     · Ideal Body Weight: 190 lbs; % Ideal Body Weight 97.4 %   · BMI: 23.7   · BMI Categories: Normal Weight (BMI 18.5-24. 9)       Nutrition Diagnosis:   · Inadequate oral intake related to cognitive or neurological impairment as evidenced by poor intake prior to admission, intake 0-25%      Nutrition Interventions:   Food and/or Nutrient Delivery:  Continue Current Diet, Start Oral Nutrition Supplement  Nutrition Education/Counseling:  Education not indicated   Coordination of Nutrition Care:  Continue to monitor while inpatient, Speech Therapy, Swallow Evaluation    Goals:  Patient will consume ~50% of meals served       Nutrition Monitoring and Evaluation:   Behavioral-Environmental Outcomes:  Beliefs and Attitutes   Food/Nutrient Intake Outcomes:  Food and Nutrient Intake, Supplement Intake  Physical Signs/Symptoms Outcomes:  Biochemical Data, Chewing or Swallowing, GI Status, Fluid Status or Edema, Hemodynamic Status, Meal Time Behavior, Nutrition Focused Physical Findings, Skin, Weight     Discharge Planning:     Too soon to determine     Electronically signed by Cindy Goel RD, KAYLEE on 12/23/20 at 9:45 AM EST    Contact: [phone]

## 2020-12-23 NOTE — PROGRESS NOTES
Pharmacy Consultation Note  (Antibiotic Dosing and Monitoring)    Initial consult date:   Consulting physician: Dr. Priti Coleman  Drug(s): Vancomycin  Indication: Pneumonia, recent hospitalization     Ht Readings from Last 1 Encounters:   20 6' 2\" (1.88 m)       Age/Gender IBW DW  Allergy Information   60 y.o.  male  83.9 kg  Bactrim [sulfamethoxazole-trimethoprim]               Date  WBC BUN/sCr UOP (mL/kg/hr) Drug/Dose Time   Given Level(s)   (Time) Comments   Day 1   32.2 143/5.3  Vancomycin 1.75gm x 1 1317       (#2)  162/6  No Vancomycin  <1200> Random      (#3)            (#4)            (#5)            (#6)            Other Antibiotics/Antifungals/Antivirals Start Date Stop Date Comments   Cefepime 1gm Q12h                            Estimated CrCL: 16 mL/min      Intake/Output Summary (Last 24 hours) at 2020 0830  Last data filed at 2020 0600  Gross per 24 hour   Intake 10 ml   Output 375 ml   Net -365 ml       Temp max: Temp (24hrs), Av.8 °F (36 °C), Min:96.5 °F (35.8 °C), Max:97.1 °F (36.2 °C)      Cultures:    Culture Date Result    COVID  Positive   Urine  Pending   Blood cultures  Pending            Assessment:  · Consulted by Dr. Priti Coleman to dose/monitor vancomycin  · Goal trough level:  15-20 mcg/mL  · Patient currently in AGUSTIN with sCr of 6.0, afebrile. Plan:  · No vancomycin today. Pending level. · Random level today at noon to guide further dosing.   · Pharmacist will follow and monitor/adjust dosing as necessary      Rohan Tran, PharmD 2020 8:30 AM

## 2020-12-24 NOTE — PROGRESS NOTES
Subjective:    Chief complaint:    Awake  Limited communication  Wife is by the bed side      Objective:    /63   Pulse 96   Temp 97 °F (36.1 °C) (Temporal)   Resp 22   Ht 6' 2\" (1.88 m)   Wt 185 lb (83.9 kg)   SpO2 91%   BMI 23.75 kg/m²   General : Awake, lethargic  Heart:  RRR, no murmurs, gallops, or rubs.   Lungs:  CTA bilaterally, no wheeze, rales or rhonchi  Abd: bowel sounds present, nontender, nondistended, no masses  Extrem:  No clubbing, cyanosis, or edema    CBC:   Lab Results   Component Value Date    WBC 23.6 12/24/2020    RBC 4.55 12/24/2020    HGB 13.2 12/24/2020    HCT 42.7 12/24/2020    MCV 93.8 12/24/2020    MCH 29.0 12/24/2020    MCHC 30.9 12/24/2020    RDW 14.3 12/24/2020     12/24/2020    MPV 12.7 12/24/2020     BMP:    Lab Results   Component Value Date     12/24/2020    K 3.9 12/24/2020     12/24/2020    CO2 23 12/24/2020     12/24/2020    LABALBU 2.4 12/24/2020    LABALBU 4.8 04/27/2012    CREATININE 4.9 12/24/2020    CALCIUM 8.7 12/24/2020    GFRAA 15 12/24/2020    LABGLOM 12 12/24/2020    GLUCOSE 105 12/24/2020    GLUCOSE 112 04/27/2012     PT/INR:    Lab Results   Component Value Date    PROTIME 14.6 12/22/2020    INR 1.3 12/22/2020     Troponin:    Lab Results   Component Value Date    TROPONINI 0.10 12/22/2020       Recent Labs     12/22/20  0403   LABURIN Growth not present     Recent Labs     12/22/20  0508   BC 24 Hours no growth     Recent Labs     12/22/20  0508   BLOODCULT2 24 Hours no growth         Current Facility-Administered Medications:     morphine (PF) injection 1 mg, 1 mg, Intravenous, Q2H PRN, Dashgracy Knight DO    dextrose 5 % solution, , Intravenous, Continuous, Darrelyn Roz, DO, Last Rate: 125 mL/hr at 12/24/20 1159, Rate Change at 12/24/20 1159    heparin flush 100 UNIT/ML injection 100 Units, 100 Units, Intracatheter, PRN, Adebayo Schuster MD, 100 Units at 12/23/20 1799   mineral oil-hydrophilic petrolatum (HYDROPHOR) ointment, , Topical, BID, Given at 12/24/20 1015 **AND** mineral oil-hydrophilic petrolatum (HYDROPHOR) ointment, , Topical, TID PRN, Jonathon Yi MD    cefepime (MAXIPIME) 1 g IVPB extended (mini-bag), 1 g, Intravenous, Q24H, Last Rate: 12.5 mL/hr at 12/24/20 1205, 1 g at 12/24/20 1205 **AND** 0.9 % sodium chloride infusion admixture, , Intravenous, Q24H, Cristobal Prince MD    sodium chloride flush 0.9 % injection 10 mL, 10 mL, Intravenous, 2 times per day, Jonathon Yi MD, 10 mL at 12/24/20 1016    sodium chloride flush 0.9 % injection 10 mL, 10 mL, Intravenous, PRN, Jonathon Yi MD    heparin (porcine) injection 5,000 Units, 5,000 Units, Subcutaneous, 3 times per day, Jonathon Yi MD, 5,000 Units at 12/24/20 1315    levothyroxine (SYNTHROID) tablet 50 mcg, 50 mcg, Oral, Daily, Jonathon Yi MD, Stopped at 12/22/20 0907    aspirin chewable tablet 81 mg, 81 mg, Oral, Daily, Jonathon Yi MD, Stopped at 12/22/20 0906    bisacodyl (DULCOLAX) suppository 10 mg, 10 mg, Rectal, Daily PRN, Jonathon Yi MD    magnesium hydroxide (MILK OF MAGNESIA) 400 MG/5ML suspension 30 mL, 30 mL, Oral, Daily PRN, Jonathon Yi MD    traMADol (ULTRAM) tablet 50 mg, 50 mg, Oral, Q4H PRN, Jonathon Yi MD    sertraline (ZOLOFT) tablet 50 mg, 50 mg, Oral, Daily, Jonathon Yi MD, Stopped at 12/22/20 0907    acetaminophen (TYLENOL) tablet 650 mg, 650 mg, Oral, Q6H PRN **OR** acetaminophen (TYLENOL) suppository 650 mg, 650 mg, Rectal, Q6H PRN, Jonathon Yi MD    dexamethasone (DECADRON) tablet 6 mg, 6 mg, Oral, Daily, Jonathon Yi MD    Vitamin D (CHOLECALCIFEROL) tablet 2,000 Units, 2,000 Units, Oral, Daily, Jonathon Yi MD, Stopped at 12/22/20 0907    vancomycin (VANCOCIN) intermittent dosing (placeholder), , Other, RX Placeholder, Jonathon Yi MD DIET CARDIAC; Dietary Nutrition Supplements: Diabetic Oral Supplement  Dietary Nutrition Supplements: Other Oral Supplement (see comment)    XR CHEST PORTABLE   Final Result   Left lower lobe pneumonia. CT Head WO Contrast   Final Result   No new abnormal findings. Encephalomalacia on the right as before. CT ABDOMEN PELVIS WO CONTRAST Additional Contrast? None   Final Result   No evidence of urinary tract obstruction or other demonstrable specific cause   of renal failure. See above. CT CHEST WO CONTRAST   Final Result   Left lower lobe pneumonia. Opacity at the right lower lobe is likely   atelectasis. Assessment:    Active Problems:    COVID-19    Altered mental status    Palliative care by specialist    Goals of care, counseling/discussion  Resolved Problems:    * No resolved hospital problems. *  Recent stroke  Underlying aphasia  Acute kidney injury  Hypernatremia  Transaminitis      Plan:    Continue Decadron/remdesivir  Empiric antibiotics  Sodium 153  Creatinine marginally better  Wife wants to wait a couple of days before making final decision regarding comfort measures  Palliative care following    Alex Tejeda  1:19 PM  12/24/2020    NOTE: This report was transcribed using voice recognition software.  Every effort was made to ensure accuracy; however, inadvertent transcription errors may be present

## 2020-12-24 NOTE — PROGRESS NOTES
Pharmacy Consultation Note  (Antibiotic Dosing and Monitoring)    Initial consult date:   Consulting physician: Dr. Priti Coleman  Drug: Vancomycin  Indication: Pneumonia, recent hospitalization     Age/  Gender Height Weight IBW Dosing weight  Allergy Information   65 y.o./male 6' 2\" (188 cm) 185 lb (83.9 kg)     Ideal body weight: 82.2 kg (181 lb 3.5 oz)  Adjusted ideal body weight: 82.9 kg (182 lb 11.7 oz)  83 kg  Bactrim [sulfamethoxazole-trimethoprim]      Temp (24hrs), Av.4 °F (35.8 °C), Min:96 °F (35.6 °C), Max:97 °F (36.1 °C)          Date  WBC BUN SCr CrCl  (mL/min) Drug/Dose Time   Given Level(s)   (Time) Comments    32.2 157 5.5     Vancomycin 1750 mg IV X 1  1317      - 164 5.5  X X Random 15.5 @ 0415     23.6 170 4.9 18 Vancomycin 1250 mg IV X 1 <1000>            Random <0600>          Intake/Output Summary (Last 24 hours) at 2020 0831  Last data filed at 2020 0551  Gross per 24 hour   Intake 0 ml   Output 1275 ml   Net -1275 ml       Historical Cultures:  Organism   Date Value Ref Range Status   2020 Enterococcus faecalis (A)  Final     Recent Labs     20  0508   BC 24 Hours no growth       Cultures:  available culture and sensitivity results were reviewed in EPIC    Assessment:  · 60 y.o. male has been initiated Vancomycin. · Goal trough level = 15-20 mcg/mL  · Pt currently in [facility]  · Pts wife conveys that pt does not want dialysis  · Random level from  = 15.5 mcg/mL      Plan:  · Give vancomycin 1250 mg x 1 dose.   · Will dose by levels  · Monitor renal function   · Clinical pharmacy to follow      ELI Osborne [facility] 2020 8:31 AM

## 2020-12-24 NOTE — PROGRESS NOTES
Nephrology Consult Note  Patient's Name: Tejas Akins  3:19 PM  12/24/2020    Nephrologist: Dr. Ksenia Mullins    Reason for Consult:  AGUSTIN & Hypernatremia  Requesting Physician:  Celia Pack MD    Chief Complaint:  AMS and SOB    History Obtained From:  past medical records    History of Present Ilness:  Per 12/22/20 Note:  Tejas Akins is a 60 y.o. 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 He had a recent hospitalization 11/30-12/3/2020 for sepsis and severe malnutrition. He had recently had a stroke and was at a rehab, where he suffered a fall with subsequent left shoulder pain and went to ER for evaluation and was found to have a possible uti with sepsis. Urine culture revealed Enterococcus faecalis. During that hospitalization, he pulled out his NG tube, a video swallow was completed, of which he passed. He was sent to the ER this am, 12/22/2020, with a change in mental status and shortness of breath. There was no fever. Pt was dehydrated. Significant labs included:  Wbc 32, Cr 5.3, Na 160, AST 81, , procalcitonin 1.46, and lactic acid 3.2. He was treated with ivf, vanc, ceftriaxone, and cefepime. SARS/ CoV2 +positive . Vitals upon presentation:  T 96.9, R 22, P 120, BP 98/75, 78% on room air, he was placed on heated high flow oxygen 60 L with FiO2 100. He has been nonverbal and non communicating. 12/23/2020: Physical exam not performed d/t +COVID 19 Infection. Palliative care met with wife who wishes no CPR but wants infection treated. 12/24/20:  No in-person visit d/t +COVID 19 isolation.      Past Medical History:   Diagnosis Date    Allergic rhinitis     had allergy shots    Cancer ([address]     leukemia    Gout     Thyroid disease        Past Surgical History:   Procedure Laterality Date    BACK SURGERY      [facility]    IR MECHANICAL ART THROMBECTOMY INTRACRANIAL  10/23/2020 IR MECHANICAL ART THROMBECTOMY INTRACRANIAL 10/23/2020 [facility]    TUMOR EXCISION      melanoma on the back       Family History   Problem Relation Age of Onset    Coronary Art Dis Father         aged 63    Breast Cancer Mother     Mult Sclerosis Sister         reports that he has never smoked. He has never used smokeless tobacco. He reports that he does not drink alcohol or use drugs. Allergies:  Bactrim [sulfamethoxazole-trimethoprim]    Current Medications:        morphine (PF) injection 1 mg, Q2H PRN      dextrose 5 % solution, Continuous      heparin flush 100 UNIT/ML injection 100 Units, PRN      mineral oil-hydrophilic petrolatum (HYDROPHOR) ointment, BID    And      mineral oil-hydrophilic petrolatum (HYDROPHOR) ointment, TID PRN      cefepime (MAXIPIME) 1 g IVPB extended (mini-bag), Q24H    And      0.9 % sodium chloride infusion admixture, Q24H      sodium chloride flush 0.9 % injection 10 mL, 2 times per day      sodium chloride flush 0.9 % injection 10 mL, PRN      heparin (porcine) injection 5,000 Units, 3 times per day      levothyroxine (SYNTHROID) tablet 50 mcg, Daily      aspirin chewable tablet 81 mg, Daily      bisacodyl (DULCOLAX) suppository 10 mg, Daily PRN      magnesium hydroxide (MILK OF MAGNESIA) 400 MG/5ML suspension 30 mL, Daily PRN      traMADol (ULTRAM) tablet 50 mg, Q4H PRN      sertraline (ZOLOFT) tablet 50 mg, Daily      acetaminophen (TYLENOL) tablet 650 mg, Q6H PRN    Or      acetaminophen (TYLENOL) suppository 650 mg, Q6H PRN      dexamethasone (DECADRON) tablet 6 mg, Daily      Vitamin D (CHOLECALCIFEROL) tablet 2,000 Units, Daily      vancomycin (VANCOCIN) intermittent dosing (placeholder), RX Placeholder        Review of Systems:   Pertinent items are noted in HPI.     Physical exam:   Constitutional: Vitals: VITALS:  /63   Pulse 98   Temp 97 °F (36.1 °C) (Temporal)   Resp 24   Ht 6' 2\" (1.88 m)   Wt 185 lb (83.9 kg)   SpO2 96%   BMI 23.75 kg/m²   24HR INTAKE/OUTPUT:      Intake/Output Summary (Last 24 hours) at 12/24/2020 1519  Last data filed at 12/24/2020 1436  Gross per 24 hour   Intake 0 ml   Output 1575 ml   Net -1575 ml     URINARY CATHETER OUTPUT ([place]):  Urethral Catheter-Output (mL): 600 mL     PE not performed-pt is COVID-19 +positive.     Data:   Labs:  CBC:   Lab Results   Component Value Date    WBC 23.6 12/24/2020    RBC 4.55 12/24/2020    HGB 13.2 12/24/2020    HCT 42.7 12/24/2020    MCV 93.8 12/24/2020    MCH 29.0 12/24/2020    MCHC 30.9 12/24/2020    RDW 14.3 12/24/2020     12/24/2020    MPV 12.7 12/24/2020     CBC with Differential:    Lab Results   Component Value Date    WBC 23.6 12/24/2020    RBC 4.55 12/24/2020    HGB 13.2 12/24/2020    HCT 42.7 12/24/2020     12/24/2020    MCV 93.8 12/24/2020    MCH 29.0 12/24/2020    MCHC 30.9 12/24/2020    RDW 14.3 12/24/2020    SEGSPCT 21 04/27/2012    METASPCT 0.9 12/24/2020    LYMPHOPCT 1.7 12/24/2020    MONOPCT 2.6 12/24/2020    BASOPCT 0.1 12/24/2020    MONOSABS 0.71 12/24/2020    LYMPHSABS 0.47 12/24/2020    EOSABS 0.00 12/24/2020    BASOSABS 0.00 12/24/2020     CMP:    Lab Results   Component Value Date     12/24/2020    K 3.9 12/24/2020     12/24/2020    CO2 23 12/24/2020     12/24/2020    CREATININE 4.9 12/24/2020    GFRAA 15 12/24/2020    LABGLOM 12 12/24/2020    GLUCOSE 105 12/24/2020    GLUCOSE 112 04/27/2012    PROT 5.9 12/24/2020    LABALBU 2.4 12/24/2020    LABALBU 4.8 04/27/2012    CALCIUM 8.7 12/24/2020    BILITOT 0.8 12/24/2020    ALKPHOS 232 12/24/2020    AST 46 12/24/2020    ALT 95 12/24/2020     BMP:    Lab Results   Component Value Date     12/24/2020    K 3.9 12/24/2020     12/24/2020    CO2 23 12/24/2020     12/24/2020    LABALBU 2.4 12/24/2020 LABALBU 4.8 04/27/2012    CREATININE 4.9 12/24/2020    CALCIUM 8.7 12/24/2020    GFRAA 15 12/24/2020    LABGLOM 12 12/24/2020    GLUCOSE 105 12/24/2020    GLUCOSE 112 04/27/2012     Calcium:    Lab Results   Component Value Date    CALCIUM 8.7 12/24/2020     Ionized Calcium:  No results found for: IONCA  Magnesium:    Lab Results   Component Value Date    MG 1.9 11/30/2020     Phosphorus:    Lab Results   Component Value Date    PHOS 3.9 10/31/2020     LDH:    Lab Results   Component Value Date     12/22/2020     Uric Acid:    Lab Results   Component Value Date    LABURIC 5.4 03/10/2017    URICACID 4.8 07/12/2016     PT/INR:    Lab Results   Component Value Date    PROTIME 14.6 12/22/2020    INR 1.3 12/22/2020     PTT:    Lab Results   Component Value Date    APTT 28.2 12/22/2020   [APTT}  Last 3 Troponin:    Lab Results   Component Value Date    TROPONINI 0.10 12/22/2020    TROPONINI <0.01 11/30/2020     U/A:    Lab Results   Component Value Date    COLORU Yellow 12/22/2020    PROTEINU TRACE 12/22/2020    PHUR 5.0 12/22/2020    WBCUA 1-3 12/22/2020    RBCUA 0-1 12/22/2020    BACTERIA MODERATE 12/22/2020    CLARITYU Clear 12/22/2020    SPECGRAV >=1.030 12/22/2020    LEUKOCYTESUR TRACE 12/22/2020    UROBILINOGEN 1.0 12/22/2020    BILIRUBINUR MODERATE 12/22/2020    BLOODU SMALL 12/22/2020    GLUCOSEU 100 12/22/2020    AMORPHOUS FEW 12/22/2020     ABG:    Lab Results   Component Value Date    PH 7.403 12/22/2020    PCO2 33.5 12/22/2020    PO2 78.4 12/22/2020    HCO3 20.4 12/22/2020    BE -3.3 12/22/2020    O2SAT 95.3 12/22/2020     HgBA1c:    Lab Results   Component Value Date    LABA1C 5.3 10/25/2020     Microalbumen/Creatinine ratio:  No components found for: RUCREAT  FLP:    Lab Results   Component Value Date    TRIG 101 10/25/2020    HDL 48 10/25/2020    LDLCALC 91 10/25/2020    LABVLDL 20 10/25/2020     TSH:    Lab Results   Component Value Date    TSH 0.958 10/29/2020 VITAMIN B12: No components found for: B12  FOLATE:    Lab Results   Component Value Date    FOLATE 19.3 03/16/2015     IRON:  No results found for: IRON  Iron Saturation:  No components found for: PERCENTFE  TIBC:  No results found for: TIBC  FERRITIN:    Lab Results   Component Value Date    FERRITIN 1,489 12/22/2020     RPR:  No results found for: RPR  AMYLASE:  No results found for: AMYLASE  LIPASE:  No results found for: LIPASE  Fibrinogen Level:  No components found for: FIB  Urine Toxicology:  No components found for: IAMMENTA, IBARBIT, IBENZO, ICOCAINE, IMARTHC, IOPIATES, IPHENCYC     Imaging:  EXAMINATION:   CT OF THE HEAD WITHOUT CONTRAST  12/22/2020 3:53 am   TECHNIQUE:   CT of the head was performed without the administration of intravenous   contrast. Dose modulation, iterative reconstruction, and/or weight based   adjustment of the mA/kV was utilized to reduce the radiation dose to as low   as reasonably achievable. COMPARISON:   None. HISTORY:   ORDERING SYSTEM PROVIDED HISTORY: AMS   TECHNOLOGIST PROVIDED HISTORY:   Reason for exam:->AMS   Has a \"code stroke\" or \"stroke alert\" been called? ->No   What reading provider will be dictating this exam?->CRC   FINDINGS:   Stable extensive right-sided encephalomalacia likely with some minimal   dystrophic calcification.  No acute hemorrhage, mass or midline shift. Stable caliber of ventricles and sulci.  Unremarkable bony calvarium.       Impression   No new abnormal findings.  Encephalomalacia on the right as before. EXAMINATION:   CT OF THE CHEST WITHOUT CONTRAST 12/22/2020 3:53 am   TECHNIQUE:   CT of the chest was performed without the administration of intravenous   contrast. Multiplanar reformatted images are provided for review. Dose   modulation, iterative reconstruction, and/or weight based adjustment of the   mA/kV was utilized to reduce the radiation dose to as low as reasonably   achievable.    COMPARISON:   October 7, 2009   HISTORY: ORDERING SYSTEM PROVIDED HISTORY: sob, hypoxia   TECHNOLOGIST PROVIDED HISTORY:   Reason for exam:->sob, hypoxia   What reading provider will be dictating this exam?->CRC   FINDINGS:   There is left lower lobe pneumonia and there is opacity at the right lower   lobe which is most likely atelectasis. Heart size is normal with some coronary artery calcification.  The caliber of   the thoracic aorta is within normal limits.  No mediastinal adenopathy is   present. No active process evident in the upper abdomen.  Chest wall is unremarkable.       Impression   Left lower lobe pneumonia.  Opacity at the right lower lobe is likely   atelectasis. Narrative   EXAMINATION:   CT OF THE ABDOMEN AND PELVIS WITHOUT CONTRAST 12/22/2020 3:53 am   TECHNIQUE:   CT of the abdomen and pelvis was performed without the administration of   intravenous contrast. Multiplanar reformatted images are provided for review. Dose modulation, iterative reconstruction, and/or weight based adjustment of   the mA/kV was utilized to reduce the radiation dose to as low as reasonably   achievable. COMPARISON:   October 7 2009   HISTORY:   ORDERING SYSTEM PROVIDED HISTORY: renal failure, weakness   TECHNOLOGIST PROVIDED HISTORY:   Reason for exam:->renal failure, weakness   Additional Contrast?->None   What reading provider will be dictating this exam?->CRC   FINDINGS:   Lower Chest: Reported separately. Organs: Gallbladder, liver, spleen, pancreas and adrenal glands demonstrate   nothing active. Babcock Gentleman is no evidence of hydronephrosis or renal parenchymal   wasting.  No space-occupying renal lesions or calcified stones are evident. GI/Bowel: Normal appendix. Babcock Gentleman is sigmoid diverticulosis. Pelvis: Nothing active. Peritoneum/Retroperitoneum: Nothing active. Bones/Soft Tissues: Nothing active.       Impression   No evidence of urinary tract obstruction or other demonstrable specific cause   of renal failure.  See above. EXAMINATION:   ONE XRAY VIEW OF THE CHEST   12/22/2020 3:45 am   COMPARISON:   30 November 2020   HISTORY:   ORDERING SYSTEM PROVIDED HISTORY: ams   TECHNOLOGIST PROVIDED HISTORY:   Reason for exam:->ams   What reading provider will be dictating this exam?->CRC   FINDINGS:   Left lower lobe pneumonia.  Heart and pulmonary vascularity are normal.   Neither costophrenic angle is blunted.       Impression   Left lower lobe pneumonia. Assessment & Plan:   1. AGUSTIN in the setting of dehydration, combined with COVID-19 infection. baseline serum Cr 0.6-0.8 mg/dl with eGFR=>60 ml/min. Serum Cr OA   4.9-->5.3-->5.5 ->6.0-->4.9  CT scan reveals no hydro, no calculi. FENa - 0.4% supports pre-renal etiology   PVR, MCR - 39.8  Pts wife conveys that pt does not want dialysis  Continue IVF D5W      2. Hypernatremia in the setting of dehydration. Na+160 OA-->156->155->153  Continue ivf  Monitor Q 12 hrs    3. DM type II  Controlled as evidenced by HgbA1c 5.3 on 10/25/2020  Primary following    4. Leukocytosis  Wbc 32.2  No fever  LLL infiltrates  Received vanc, cefepime, ceftriaxone in ER  On Vanc  ID following. 5. Altered Mental Status  Primary following     6. COVID 19 Infection  Received steroids  ID following.        Thank you Dr. Jory Bhakta MD for allowing us to participate in care of Varinder Toledo  3:15 PM  12/24/2020     Pt not examined due to covid agree with above  Na improving with ivf  Cr starting to improve  Eduardo Rosales

## 2020-12-24 NOTE — PROGRESS NOTES
[place]  Department of Internal Medicine  Division of Pulmonary, Critical Care and Sleep Medicine  Progress Note    Malcolm Holt DO, Gabi Moseley MD, [facility]    Patient: Radha Lee  MRN: [id number]  : 1955    Encounter Time: 11:56 AM     Date of Admission: 2020  3:15 AM    Primary Care Physician: Kun Castaneda MD    Reason for Consultation: [place] and Sepsis from [address]:    Per wife   No change   On abx   On D5 W      OBJECTIVE:     PHYSICAL EXAM:   VITALS:     Intake/Output Summary (Last 24 hours) at 2020 1156  Last data filed at 2020 1016  Gross per 24 hour   Intake 0 ml   Output 1275 ml   Net -1275 ml        CONSTITUTIONAL:   Alert, NAD  SKIN:     Pale skin discoloration  HEENT:     No thrush  NECK:    No bruits, No JVP apprechiated  CV:      Sinus,  No murmur, No rubs, No gallops  PULMONARY:   Couse BS,  No Rhonchi      No noted egophony  ABDOMEN:     Soft, non-tender. BS normal. No R/R/G  EXT:    No deformities . No clubbing.       + lower extremity edema, No venous stasis  PULSE:   Appears equal and palpable.   PSYCHIATRIC:  aphasia  MS:    Gross weakness  NEUROLOGIC:   Focal     DATA: IMAGING & TESTING:     LABORATORY TESTS:    CBC:   Lab Results   Component Value Date    WBC 23.6 2020    RBC 4.55 2020    HGB 13.2 2020    HCT 42.7 2020    MCV 93.8 2020    MCH 29.0 2020    MCHC 30.9 2020    RDW 14.3 2020     2020    MPV 12.7 2020     CMP:    Lab Results   Component Value Date     2020    K 3.9 2020     2020    CO2 23 2020     2020    CREATININE 4.9 2020    GFRAA 15 2020    LABGLOM 12 2020    GLUCOSE 105 2020    GLUCOSE 112 2012    PROT 5.9 2020    LABALBU 2.4 2020    LABALBU 4.8 2012    CALCIUM 8.7 2020    BILITOT 0.8 2020 ALKPHOS 232 12/24/2020    AST 46 12/24/2020    ALT 95 12/24/2020     TSH:    Lab Results   Component Value Date    TSH 0.958 10/29/2020     FERRITIN:    Lab Results   Component Value Date    FERRITIN 1,489 12/22/2020     Fibrinogen Level:  No components found for: FIB     PRO-BNP:   Lab Results   Component Value Date    PROBNP 1,385 (H) 12/22/2020    PROBNP 120 11/30/2020      ABGs:   Lab Results   Component Value Date    PH 7.403 12/22/2020    PO2 78.4 12/22/2020    PCO2 33.5 12/22/2020     Hemoglobin A1C: No components found for: HGBA1C    IMAGING:  Imaging tests were completed and reviewed and discussed radiology and care team involved and reveals     Ct Head Wo Contrast    Result Date: 12/22/2020  EXAMINATION: CT OF THE HEAD WITHOUT CONTRAST  12/22/2020 3:53 am TECHNIQUE: CT of the head was performed without the administration of intravenous contrast. Dose modulation, iterative reconstruction, and/or weight based adjustment of the mA/kV was utilized to reduce the radiation dose to as low as reasonably achievable. COMPARISON: None. HISTORY: ORDERING SYSTEM PROVIDED HISTORY: AMS TECHNOLOGIST PROVIDED HISTORY: Reason for exam:->AMS Has a \"code stroke\" or \"stroke alert\" been called? ->No What reading provider will be dictating this exam?->CRC FINDINGS: Stable extensive right-sided encephalomalacia likely with some minimal dystrophic calcification. No acute hemorrhage, mass or midline shift. Stable caliber of ventricles and sulci. Unremarkable bony calvarium. No new abnormal findings. Encephalomalacia on the right as before.     Ct Head Wo Contrast    Result Date: 11/30/2020 EXAMINATION: CT OF THE HEAD WITHOUT CONTRAST  11/30/2020 12:39 pm TECHNIQUE: CT of the head was performed without the administration of intravenous contrast. Dose modulation, iterative reconstruction, and/or weight based adjustment of the mA/kV was utilized to reduce the radiation dose to as low as reasonably achievable. COMPARISON: 10/28/2020 HISTORY: ORDERING SYSTEM PROVIDED HISTORY: fall at [facility] TECHNOLOGIST PROVIDED HISTORY: Has a \"code stroke\" or \"stroke alert\" been called? ->No Reason for exam:->fall at [facility] What reading provider will be dictating this exam?->CRC FINDINGS: BRAIN/VENTRICLES: No acute intracranial hemorrhage or cortical based infarct. Extensive encephalomalacia within the right MCA distribution secondary to remote infarct. No mass effect or midline shift. No abnormal extra-axial fluid collections. The ventricles are intact without features of hydrocephalus. ORBITS: The visualized portion of the orbits demonstrate no acute abnormality. SINUSES: The visualized paranasal sinuses and mastoid air cells demonstrate no acute abnormality. SOFT TISSUES/SKULL:  No acute abnormality of the visualized skull or soft tissues. No acute intracranial abnormality.     Ct Chest Wo Contrast    Result Date: 12/22/2020 EXAMINATION: CT OF THE CHEST WITHOUT CONTRAST 12/22/2020 3:53 am TECHNIQUE: CT of the chest was performed without the administration of intravenous contrast. Multiplanar reformatted images are provided for review. Dose modulation, iterative reconstruction, and/or weight based adjustment of the mA/kV was utilized to reduce the radiation dose to as low as reasonably achievable. COMPARISON: October 7, 2009 HISTORY: ORDERING SYSTEM PROVIDED HISTORY: sob, hypoxia TECHNOLOGIST PROVIDED HISTORY: Reason for exam:->sob, hypoxia What reading provider will be dictating this exam?->CRC FINDINGS: There is left lower lobe pneumonia and there is opacity at the right lower lobe which is most likely atelectasis. Heart size is normal with some coronary artery calcification. The caliber of the thoracic aorta is within normal limits. No mediastinal adenopathy is present. No active process evident in the upper abdomen. Chest wall is unremarkable. Left lower lobe pneumonia. Opacity at the right lower lobe is likely atelectasis. Fl Modified Barium Swallow W Video  Result Date: 12/2/2020  Swallowing dysfunction as described above including deep penetration of thin liquid barium and silent aspiration of nectar. Please see separate speech pathology report for full discussion of findings and recommendations. Assessment:   1. COVID-19 infection,   2. Sepsis,   3. Aphasia, CVA chronic  4. Encephalomalacia  5. HCAP, Left lower lobe pneumonia. 6. hyponatremia,   7. acute kidney injury,   8. Lactic acidosis        Plan:   1. Decadron 6 mg for 10 day  2. No Remdesivir with Renal failure  3. Tocilizumab to get given (max dose is 800), give 1/2 and look for response ? If was done  4. His main issue is sepsis not CoVID  5. ID consulted  6.  On Abx per ID    Andrés Machado, MPH, [facility]  Professor of Medicine  Pulmonary, Critical Care and Sleep Medicine

## 2020-12-24 NOTE — PROGRESS NOTES
Palliative Care Department  [phone]  Palliative Care Progress Note  Joselo Hurtado  [id number]  Hospital Day: 3    Date of Initial Consult: 12/23/20  Referring Provider:  Dr. Eufemia West was consulted for assistance with:goals of care and code status discussion      HPI:   Trina Cade is a 60 y.o. with a past medical history of gout, leukemia, HLD,  thyroid disease, intracranial thrombectomy (CVA-10/23/20) who was admitted on 12/22/2020 from local nursing facility with a CHIEF COMPLAINT of altered mental status for ~ 2 days and shortness of breath. EMS found pt to be hypoxic; pt was placed on non rebreather mask. Workup in ED noting left LL pneumonia; leukocytosis of 32.3; hyponatremia; AGUSTIN; UTI and +for COVID 19. CT of head noted no new findings. He was started on IV fluids,  antibiotics and decadron. He was placed on Optiflow. He was admitted for further care. ASSESSMENT/PLAN:     Pertinent Hospital Diagnoses   ? COVID 19 pneumonia/resp failure with hypoxia-continue oxygen supplementation as needed; dexamethasone; ID consulted; continues on antibiotics  ? Septicemia continue antibiotics  ? AMS- continue to evaluate neuro sats  ? AGUSTIN- Nephrology consulted; continue fluids  ? Hypernatremia- continue fluids; monitor Na+    Palliative Care Encounter / Counseling Regarding Goals of Care    ? Trina Cade, Does Not have capacity for medical decision-making. Capacity is time limited and situation/question specific  ? During encounter wife Frank Dykes was surrogate medical decision-maker  ? Outcome of goals of care meeting: Continue current care; treat infections; nutrition-does not want feeding tube; ok for \"pleasure feedings\". ? Code status ;  limited code- no to everything  ? Advanced Directives: no HPOA or Living Will noted in chart  ?  Surrogate/Legal NOK:  o spouse Tracy Marks @ [phone]    Spiritual assessment: no spiritual distress identified Bereavement and grief: to be determined  Referrals to: none today    SUBJECTIVE:     Active Hospital Problems    Diagnosis Date Noted    Altered mental status [R41.82]     Palliative care by specialist [Z51.5]     Goals of care, counseling/discussion [Z71.89]     COVID-19 [U07.1] 12/22/2020     Details of Conversation:    Met with patient's wife Leslie and patient at the bedside. Patient reporting he feels awful with complaints of generalized pain and dyspnea. Initially hasn't wanted anything for his pain but is agreeable to try a small dose of morphine for his dyspnea. Mario Hodges states she thinks patient looks worse today. Patient complaining of dry mouth as well. Leslie and patient both want to continue with IVF and antibiotic to see if patient will recover at all. They are not yet ready for hospice, but confirm they do not want escalation of care either.     OBJECTIVE:   Prognosis: Guarded    Physical Exam:  /63   Pulse 96   Temp 97 °F (36.1 °C)   Resp 22   Ht 6' 2\" (1.88 m)   Wt 185 lb (83.9 kg)   SpO2 91%   BMI 23.75 kg/m²   Gen awake, appears uncomfortable, cachectic with temple muscle wasting and sarcopenia  HEENT:  Normocephalic, atraumatic, mucosa very dry, EOMI  Neck:  trachea midline, no JVD  Lungs: on oxygen per nasal cannula; diminished sounds bilaterally; increased respiratory effort  Heart:  Regular rate and rhythm, normal S1S2, no murmur/rub or gallop   Abd:  Soft, non tender, scaphoid, bowel sounds hypoactive  :  catheter  Ext:  No movement LUE; flaccid, no edema, pulses present  Skin:  Cool/pale, some toes starting to look dusky  Neuro:  Flat affect, weak voice    Objective data reviewed: labs, images, records, medication use, vitals and chart    Discussed patient and the plan of care with the other IDT members: Palliative Medicine IDT Team    Time/Communication Greater than 50% of time spent, total 35 minutes in counseling and coordination of care at the bedside regarding goals of care, symptom management, diagnosis and prognosis and see above. Thank you for allowing Palliative Medicine to participate in the care of Milan Tena.

## 2020-12-24 NOTE — PROGRESS NOTES
Department of Internal Medicine  Infectious Diseases  Progress Note      C/C :   COVID 19 , leukocytosis ,pneumonia     Discussed with pt's wife    Pt is awake , no distress  Afebrile      Current Facility-Administered Medications   Medication Dose Route Frequency Provider Last Rate Last Admin    morphine (PF) injection 1 mg  1 mg Intravenous Q2H PRN Latisha Brush, DO   1 mg at 12/24/20 1322    dextrose 5 % solution   Intravenous Continuous Jenni Phillip,  mL/hr at 12/24/20 1159 Rate Change at 12/24/20 1159    heparin flush 100 UNIT/ML injection 100 Units  100 Units Intracatheter PRN Lisa Fairbanks MD   100 Units at 12/23/20 1343    mineral oil-hydrophilic petrolatum (HYDROPHOR) ointment   Topical BID Lisa Fairbanks MD   Given at 12/24/20 1015    And    mineral oil-hydrophilic petrolatum (HYDROPHOR) ointment   Topical TID PRN Lisa Fairbanks MD        cefepime (MAXIPIME) 1 g IVPB extended (mini-bag)  1 g Intravenous Q24H Cristobal Prince MD 12.5 mL/hr at 12/24/20 1205 1 g at 12/24/20 1205    And    0.9 % sodium chloride infusion admixture   Intravenous Q24H Cristobal Prince MD        sodium chloride flush 0.9 % injection 10 mL  10 mL Intravenous 2 times per day Lisa Fairbanks MD   10 mL at 12/24/20 1016    sodium chloride flush 0.9 % injection 10 mL  10 mL Intravenous PRN Lisa Fairbanks MD        heparin (porcine) injection 5,000 Units  5,000 Units Subcutaneous 3 times per day Lisa Fairbanks MD   5,000 Units at 12/24/20 1315    levothyroxine (SYNTHROID) tablet 50 mcg  50 mcg Oral Daily Lisa Fairbanks MD   Stopped at 12/22/20 3464    aspirin chewable tablet 81 mg  81 mg Oral Daily Lisa Fairbanks MD   Stopped at 12/22/20 4300    bisacodyl (DULCOLAX) suppository 10 mg  10 mg Rectal Daily PRN Lisa Fairbanks MD        magnesium hydroxide (MILK OF MAGNESIA) 400 MG/5ML suspension 30 mL  30 mL Oral Daily PRN Lisa Fairbanks MD  traMADol (ULTRAM) tablet 50 mg  50 mg Oral Q4H PRN Familia Nielsen MD        sertraline (ZOLOFT) tablet 50 mg  50 mg Oral Daily Familia Nielsen MD   Stopped at 12/22/20 6776    acetaminophen (TYLENOL) tablet 650 mg  650 mg Oral Q6H PRN Familia Nielsen MD        Or   Tri Lopez acetaminophen (TYLENOL) suppository 650 mg  650 mg Rectal Q6H PRN Familia Nielsen MD        dexamethasone (DECADRON) tablet 6 mg  6 mg Oral Daily Familia Nielsen MD        Vitamin D (CHOLECALCIFEROL) tablet 2,000 Units  2,000 Units Oral Daily Familia Nielsen MD   Stopped at 12/22/20 0907    vancomycin (VANCOCIN) intermittent dosing (placeholder)   Other RX Placeholder Alex Rodriguez MD         REVIEW OF SYSTEMS: could not be obtained       PHYSICAL EXAM:      Vitals:     /63   Pulse 98   Temp 97 °F (36.1 °C) (Temporal)   Resp 24   Ht 6' 2\" (1.88 m)   Wt 185 lb (83.9 kg)   SpO2 96%   BMI 23.75 kg/m²     General Appearance:     Awake, non communicating,looks chronically ill      Head:    Normocephalic, atraumatic   Eyes:    +  pallor, no icterus,   Ears:    No obvious deformity or drainage.    Nose:   No nasal drainage   Throat:   Mucosa dry    Neck:   Supple, no lymphadenopathy   Lungs:     Decreased breath sound,    Heart:    Tachycardic    Abdomen:     Soft,not distended, bowel sounds present    Extremities:   No edema,    Pulses:   Dorsalis pedis palpable    Skin:   no rashes or lesions   CBC with Differential:      Lab Results   Component Value Date    WBC 23.6 12/24/2020    RBC 4.55 12/24/2020    HGB 13.2 12/24/2020    HCT 42.7 12/24/2020     12/24/2020    MCV 93.8 12/24/2020    MCH 29.0 12/24/2020    MCHC 30.9 12/24/2020    RDW 14.3 12/24/2020    SEGSPCT 21 04/27/2012    METASPCT 0.9 12/24/2020    LYMPHOPCT 1.7 12/24/2020    MONOPCT 2.6 12/24/2020    BASOPCT 0.1 12/24/2020    MONOSABS 0.71 12/24/2020    LYMPHSABS 0.47 12/24/2020    EOSABS 0.00 12/24/2020 BASOSABS 0.00 12/24/2020       CMP     Lab Results   Component Value Date     12/24/2020    K 3.9 12/24/2020     12/24/2020    CO2 23 12/24/2020     12/24/2020    CREATININE 4.9 12/24/2020    GFRAA 15 12/24/2020    LABGLOM 12 12/24/2020    GLUCOSE 105 12/24/2020    GLUCOSE 112 04/27/2012    PROT 5.9 12/24/2020    LABALBU 2.4 12/24/2020    LABALBU 4.8 04/27/2012    CALCIUM 8.7 12/24/2020    BILITOT 0.8 12/24/2020    ALKPHOS 232 12/24/2020    AST 46 12/24/2020    ALT 95 12/24/2020       Hepatic Function Panel:      Lab Results   Component Value Date    ALKPHOS 232 12/24/2020    ALT 95 12/24/2020    AST 46 12/24/2020    PROT 5.9 12/24/2020    BILITOT 0.8 12/24/2020    LABALBU 2.4 12/24/2020    LABALBU 4.8 04/27/2012       PT/INR:    Lab Results   Component Value Date    PROTIME 14.6 12/22/2020    INR 1.3 12/22/2020       TSH:    Lab Results   Component Value Date    TSH 0.958 10/29/2020       U/A:      Lab Results   Component Value Date    COLORU Yellow 12/22/2020    PHUR 5.0 12/22/2020    WBCUA 1-3 12/22/2020    RBCUA 0-1 12/22/2020    BACTERIA MODERATE 12/22/2020    CLARITYU Clear 12/22/2020    SPECGRAV >=1.030 12/22/2020    LEUKOCYTESUR TRACE 12/22/2020    UROBILINOGEN 1.0 12/22/2020    BILIRUBINUR MODERATE 12/22/2020    BLOODU SMALL 12/22/2020    GLUCOSEU 100 12/22/2020    AMORPHOUS FEW 12/22/2020       ABG:  No results found for: YGM9TFK, BEART, P6MGAZZV, PHART, THGBART, GKW7RDS, PO2ART, YWG0KIJ    MICROBIOLOGY:    Blood culture - pending     Urine Culture -     SARS-CoV-2, PCR DETECTEDAbnormal        Radiology :      CT scan of chest -  Left lower lobe pneumonia.  Opacity at the right lower lobe is likely   Atelectasis. IMPRESSION:     1. Respiratory failure ,on  13 L of high flow oxygen , saturation 96 %   2. COVID 19 pneumonia ( Severe ) / Pneumonia / Sepsis   3. Leukocytosis, lactic acidosis, elevated procalcitonin   4.  Transaminitis       RECOMMENDATIONS: 1. Decadron 6 mg IV q 12 hrs   2. Remdesivir not given due to renal failure   3. Cefepime 1 gram IV q 24 hrs, Vancomycin PRN    4.  Palliative following

## 2020-12-25 NOTE — PROGRESS NOTES
Department of Internal Medicine  Infectious Diseases  Progress Note      C/C :   COVID 19 , leukocytosis ,pneumonia     Discussed with pt's wife  Pt is lethargic       Current Facility-Administered Medications   Medication Dose Route Frequency Provider Last Rate Last Admin    morphine (PF) injection 1 mg  1 mg Intravenous Q2H PRN Kyleigh Flynnp, DO   1 mg at 12/25/20 1217    dextrose 5 % solution   Intravenous Continuous Sellers Josiah,  mL/hr at 12/25/20 1217 New Bag at 12/25/20 1217    heparin flush 100 UNIT/ML injection 100 Units  100 Units Intracatheter PRN Celia Pack MD   100 Units at 12/24/20 2021    mineral oil-hydrophilic petrolatum (HYDROPHOR) ointment   Topical BID Celia Pack MD   Given at 12/25/20 0900    And    mineral oil-hydrophilic petrolatum (HYDROPHOR) ointment   Topical TID PRN Celia Pack MD        cefepime (MAXIPIME) 1 g IVPB extended (mini-bag)  1 g Intravenous Q24H Opal Shepard MD   Stopped at 12/24/20 1608    And    0.9 % sodium chloride infusion admixture   Intravenous Q24H Cristobal Prince MD        sodium chloride flush 0.9 % injection 10 mL  10 mL Intravenous 2 times per day Celia Pack MD   10 mL at 12/24/20 2020    sodium chloride flush 0.9 % injection 10 mL  10 mL Intravenous PRN Celia Pack MD        heparin (porcine) injection 5,000 Units  5,000 Units Subcutaneous 3 times per day Celia Pack MD   5,000 Units at 12/25/20 4871    levothyroxine (SYNTHROID) tablet 50 mcg  50 mcg Oral Daily Celia Pack MD   50 mcg at 12/25/20 7717    aspirin chewable tablet 81 mg  81 mg Oral Daily Celia Pack MD   Stopped at 12/22/20 9444    bisacodyl (DULCOLAX) suppository 10 mg  10 mg Rectal Daily PRN Celia Pack MD        magnesium hydroxide (MILK OF MAGNESIA) 400 MG/5ML suspension 30 mL  30 mL Oral Daily PRN Celia Pack MD  traMADol (ULTRAM) tablet 50 mg  50 mg Oral Q4H PRN David Drake MD        sertraline (ZOLOFT) tablet 50 mg  50 mg Oral Daily David Drake MD   Stopped at 12/22/20 5182    acetaminophen (TYLENOL) tablet 650 mg  650 mg Oral Q6H PRN David Drake MD        Or   Kash Loser acetaminophen (TYLENOL) suppository 650 mg  650 mg Rectal Q6H PRN David Drake MD        dexamethasone (DECADRON) tablet 6 mg  6 mg Oral Daily David Drake MD        Vitamin D (CHOLECALCIFEROL) tablet 2,000 Units  2,000 Units Oral Daily David Drake MD   Stopped at 12/22/20 0907    vancomycin (VANCOCIN) intermittent dosing (placeholder)   Other RX Placeholder Alex Oquendo MD         REVIEW OF SYSTEMS: could not be obtained       PHYSICAL EXAM:      Vitals:     /61   Pulse 100   Temp 97.2 °F (36.2 °C) (Temporal)   Resp 15   Ht 6' 2\" (1.88 m)   Wt 185 lb (83.9 kg)   SpO2 92%   BMI 23.75 kg/m²     General Appearance:     Lethargic      Head:    Normocephalic, atraumatic   Eyes:    +  pallor, no icterus,   Ears:    No obvious deformity or drainage.    Nose:   No nasal drainage   Throat:   Mucosa dry    Neck:   Supple, no lymphadenopathy   Lungs:     Decreased breath sound,    Heart:    Tachycardic    Abdomen:     Soft,not distended, bowel sounds present    Extremities:   No edema,    Pulses:   Dorsalis pedis palpable    Skin:   no rashes or lesions   CBC with Differential:      Lab Results   Component Value Date    WBC 23.4 12/25/2020    RBC 4.24 12/25/2020    HGB 12.8 12/25/2020    HCT 39.6 12/25/2020     12/25/2020    MCV 93.4 12/25/2020    MCH 30.2 12/25/2020    MCHC 32.3 12/25/2020    RDW 14.4 12/25/2020    SEGSPCT 21 04/27/2012    METASPCT 0.9 12/24/2020    LYMPHOPCT 2.9 12/25/2020    MONOPCT 2.6 12/25/2020    BASOPCT 0.3 12/25/2020    MONOSABS 0.70 12/25/2020    LYMPHSABS 0.00 12/25/2020    EOSABS 0.21 12/25/2020    BASOSABS 0.00 12/25/2020       CMP     Lab Results 2. Remdesivir not given due to renal failure   3. Cefepime 1 gram IV q 24 hrs, Vancomycin PRN    4.  Palliative following - transfer to hospice

## 2020-12-25 NOTE — PROGRESS NOTES
Nephrology Consult Note  Patient's Name: Joann Weinstein  12:51 PM  12/25/2020    Nephrologist: Dr. Catherine Amin    Reason for Consult:  AGUSTIN & Hypernatremia  Requesting Physician:  Yara Preciado MD    Chief Complaint:  AMS and SOB    History Obtained From:  past medical records    History of Present Ilness:  Per 12/22/20 Note:  Joann Weinstein is a 60 y.o. 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 He had a recent hospitalization 11/30-12/3/2020 for sepsis and severe malnutrition. He had recently had a stroke and was at a rehab, where he suffered a fall with subsequent left shoulder pain and went to ER for evaluation and was found to have a possible uti with sepsis. Urine culture revealed Enterococcus faecalis. During that hospitalization, he pulled out his NG tube, a video swallow was completed, of which he passed. He was sent to the ER this am, 12/22/2020, with a change in mental status and shortness of breath. There was no fever. Pt was dehydrated. Significant labs included:  Wbc 32, Cr 5.3, Na 160, AST 81, , procalcitonin 1.46, and lactic acid 3.2. He was treated with ivf, vanc, ceftriaxone, and cefepime. SARS/ CoV2 +positive . Vitals upon presentation:  T 96.9, R 22, P 120, BP 98/75, 78% on room air, he was placed on heated high flow oxygen 60 L with FiO2 100. He has been nonverbal and non communicating. 12/23/2020: Physical exam not performed d/t +COVID 19 Infection. Palliative care met with wife who wishes no CPR but wants infection treated. 12/24/20:  No in-person visit d/t +COVID 19 isolation.    12/25: pt not examined due to covid, chart  reviewed    Past Medical History:   Diagnosis Date    Allergic rhinitis     had allergy shots    Cancer ([facility] [address]     leukemia    Gout     Thyroid disease        Past Surgical History:   Procedure Laterality Date    BACK SURGERY      for spinal stenosis [facility]  IR MECHANICAL ART THROMBECTOMY INTRACRANIAL  10/23/2020    IR MECHANICAL ART THROMBECTOMY INTRACRANIAL 10/23/2020 [facility]    TUMOR EXCISION      melanoma on the back       Family History   Problem Relation Age of Onset    Coronary Art Dis Father         aged 61    Breast Cancer Mother     Mult Sclerosis Sister         reports that he has never smoked. He has never used smokeless tobacco. He reports that he does not drink alcohol or use drugs. Allergies:  Bactrim [sulfamethoxazole-trimethoprim]    Current Medications:        morphine (PF) injection 1 mg, Q2H PRN      dextrose 5 % solution, Continuous      heparin flush 100 UNIT/ML injection 100 Units, PRN      mineral oil-hydrophilic petrolatum (HYDROPHOR) ointment, BID    And      mineral oil-hydrophilic petrolatum (HYDROPHOR) ointment, TID PRN      cefepime (MAXIPIME) 1 g IVPB extended (mini-bag), Q24H    And      0.9 % sodium chloride infusion admixture, Q24H      sodium chloride flush 0.9 % injection 10 mL, 2 times per day      sodium chloride flush 0.9 % injection 10 mL, PRN      heparin (porcine) injection 5,000 Units, 3 times per day      levothyroxine (SYNTHROID) tablet 50 mcg, Daily      aspirin chewable tablet 81 mg, Daily      bisacodyl (DULCOLAX) suppository 10 mg, Daily PRN      magnesium hydroxide (MILK OF MAGNESIA) 400 MG/5ML suspension 30 mL, Daily PRN      traMADol (ULTRAM) tablet 50 mg, Q4H PRN      sertraline (ZOLOFT) tablet 50 mg, Daily      acetaminophen (TYLENOL) tablet 650 mg, Q6H PRN    Or      acetaminophen (TYLENOL) suppository 650 mg, Q6H PRN      dexamethasone (DECADRON) tablet 6 mg, Daily      Vitamin D (CHOLECALCIFEROL) tablet 2,000 Units, Daily      vancomycin (VANCOCIN) intermittent dosing (placeholder), RX Placeholder        Review of Systems:   Pertinent items are noted in HPI.     Physical exam:   Constitutional: Vitals: VITALS:  /61   Pulse 100   Temp 97.2 °F (36.2 °C) (Temporal)   Resp 15   Ht 6' 2\" (1.88 m)   Wt 185 lb (83.9 kg)   SpO2 92%   BMI 23.75 kg/m²   24HR INTAKE/OUTPUT:      Intake/Output Summary (Last 24 hours) at 12/25/2020 1251  Last data filed at 12/25/2020 0429  Gross per 24 hour   Intake 0 ml   Output 1650 ml   Net -1650 ml     URINARY CATHETER OUTPUT ([place]):  Urethral Catheter-Output (mL): 450 mL     PE not performed-pt is COVID-19 +positive.     Data:   Labs:  CBC:   Lab Results   Component Value Date    WBC 23.4 12/25/2020    RBC 4.24 12/25/2020    HGB 12.8 12/25/2020    HCT 39.6 12/25/2020    MCV 93.4 12/25/2020    MCH 30.2 12/25/2020    MCHC 32.3 12/25/2020    RDW 14.4 12/25/2020     12/25/2020    MPV 13.1 12/25/2020     CBC with Differential:    Lab Results   Component Value Date    WBC 23.4 12/25/2020    RBC 4.24 12/25/2020    HGB 12.8 12/25/2020    HCT 39.6 12/25/2020     12/25/2020    MCV 93.4 12/25/2020    MCH 30.2 12/25/2020    MCHC 32.3 12/25/2020    RDW 14.4 12/25/2020    SEGSPCT 21 04/27/2012    METASPCT 0.9 12/24/2020    LYMPHOPCT 2.9 12/25/2020    MONOPCT 2.6 12/25/2020    BASOPCT 0.3 12/25/2020    MONOSABS 0.70 12/25/2020    LYMPHSABS 0.00 12/25/2020    EOSABS 0.21 12/25/2020    BASOSABS 0.00 12/25/2020     CMP:    Lab Results   Component Value Date     12/25/2020    K 3.9 12/25/2020     12/25/2020    CO2 21 12/25/2020     12/25/2020    CREATININE 3.5 12/25/2020    GFRAA 21 12/25/2020    LABGLOM 18 12/25/2020    GLUCOSE 129 12/25/2020    GLUCOSE 112 04/27/2012    PROT 5.7 12/25/2020    LABALBU 2.4 12/25/2020    LABALBU 4.8 04/27/2012    CALCIUM 8.6 12/25/2020    BILITOT 0.8 12/25/2020    ALKPHOS 218 12/25/2020    AST 38 12/25/2020    ALT 70 12/25/2020     BMP:    Lab Results   Component Value Date     12/25/2020    K 3.9 12/25/2020     12/25/2020    CO2 21 12/25/2020     12/25/2020    LABALBU 2.4 12/25/2020 LABALBU 4.8 04/27/2012    CREATININE 3.5 12/25/2020    CALCIUM 8.6 12/25/2020    GFRAA 21 12/25/2020    LABGLOM 18 12/25/2020    GLUCOSE 129 12/25/2020    GLUCOSE 112 04/27/2012     Calcium:    Lab Results   Component Value Date    CALCIUM 8.6 12/25/2020     Ionized Calcium:  No results found for: IONCA  Magnesium:    Lab Results   Component Value Date    MG 1.9 11/30/2020     Phosphorus:    Lab Results   Component Value Date    PHOS 3.9 10/31/2020     LDH:    Lab Results   Component Value Date     12/22/2020     Uric Acid:    Lab Results   Component Value Date    LABURIC 5.4 03/10/2017    URICACID 4.8 07/12/2016     PT/INR:    Lab Results   Component Value Date    PROTIME 14.6 12/22/2020    INR 1.3 12/22/2020     PTT:    Lab Results   Component Value Date    APTT 28.2 12/22/2020   [APTT}  Last 3 Troponin:    Lab Results   Component Value Date    TROPONINI 0.10 12/22/2020    TROPONINI <0.01 11/30/2020     U/A:    Lab Results   Component Value Date    COLORU Yellow 12/22/2020    PROTEINU TRACE 12/22/2020    PHUR 5.0 12/22/2020    WBCUA 1-3 12/22/2020    RBCUA 0-1 12/22/2020    BACTERIA MODERATE 12/22/2020    CLARITYU Clear 12/22/2020    SPECGRAV >=1.030 12/22/2020    LEUKOCYTESUR TRACE 12/22/2020    UROBILINOGEN 1.0 12/22/2020    BILIRUBINUR MODERATE 12/22/2020    BLOODU SMALL 12/22/2020    GLUCOSEU 100 12/22/2020    AMORPHOUS FEW 12/22/2020     ABG:    Lab Results   Component Value Date    PH 7.403 12/22/2020    PCO2 33.5 12/22/2020    PO2 78.4 12/22/2020    HCO3 20.4 12/22/2020    BE -3.3 12/22/2020    O2SAT 95.3 12/22/2020     HgBA1c:    Lab Results   Component Value Date    LABA1C 5.3 10/25/2020     Microalbumen/Creatinine ratio:  No components found for: RUCREAT  FLP:    Lab Results   Component Value Date    TRIG 101 10/25/2020    HDL 48 10/25/2020    LDLCALC 91 10/25/2020    LABVLDL 20 10/25/2020     TSH:    Lab Results   Component Value Date    TSH 0.958 10/29/2020 VITAMIN B12: No components found for: B12  FOLATE:    Lab Results   Component Value Date    FOLATE 19.3 03/16/2015     IRON:  No results found for: IRON  Iron Saturation:  No components found for: PERCENTFE  TIBC:  No results found for: TIBC  FERRITIN:    Lab Results   Component Value Date    FERRITIN 1,489 12/22/2020     RPR:  No results found for: RPR  AMYLASE:  No results found for: AMYLASE  LIPASE:  No results found for: LIPASE  Fibrinogen Level:  No components found for: FIB  Urine Toxicology:  No components found for: IAMMENTA, IBARBIT, IBENZO, ICOCAINE, IMARTHC, IOPIATES, IPHENCYC     Imaging:  EXAMINATION:   CT OF THE HEAD WITHOUT CONTRAST  12/22/2020 3:53 am   TECHNIQUE:   CT of the head was performed without the administration of intravenous   contrast. Dose modulation, iterative reconstruction, and/or weight based   adjustment of the mA/kV was utilized to reduce the radiation dose to as low   as reasonably achievable. COMPARISON:   None. HISTORY:   ORDERING SYSTEM PROVIDED HISTORY: AMS   TECHNOLOGIST PROVIDED HISTORY:   Reason for exam:->AMS   Has a \"code stroke\" or \"stroke alert\" been called? ->No   What reading provider will be dictating this exam?->CRC   FINDINGS:   Stable extensive right-sided encephalomalacia likely with some minimal   dystrophic calcification.  No acute hemorrhage, mass or midline shift. Stable caliber of ventricles and sulci.  Unremarkable bony calvarium.       Impression   No new abnormal findings.  Encephalomalacia on the right as before. EXAMINATION:   CT OF THE CHEST WITHOUT CONTRAST 12/22/2020 3:53 am   TECHNIQUE:   CT of the chest was performed without the administration of intravenous   contrast. Multiplanar reformatted images are provided for review. Dose   modulation, iterative reconstruction, and/or weight based adjustment of the   mA/kV was utilized to reduce the radiation dose to as low as reasonably   achievable.    COMPARISON:   October 7, 2009   HISTORY: ORDERING SYSTEM PROVIDED HISTORY: sob, hypoxia   TECHNOLOGIST PROVIDED HISTORY:   Reason for exam:->sob, hypoxia   What reading provider will be dictating this exam?->CRC   FINDINGS:   There is left lower lobe pneumonia and there is opacity at the right lower   lobe which is most likely atelectasis. Heart size is normal with some coronary artery calcification.  The caliber of   the thoracic aorta is within normal limits.  No mediastinal adenopathy is   present. No active process evident in the upper abdomen.  Chest wall is unremarkable.       Impression   Left lower lobe pneumonia.  Opacity at the right lower lobe is likely   atelectasis. Narrative   EXAMINATION:   CT OF THE ABDOMEN AND PELVIS WITHOUT CONTRAST 12/22/2020 3:53 am   TECHNIQUE:   CT of the abdomen and pelvis was performed without the administration of   intravenous contrast. Multiplanar reformatted images are provided for review. Dose modulation, iterative reconstruction, and/or weight based adjustment of   the mA/kV was utilized to reduce the radiation dose to as low as reasonably   achievable. COMPARISON:   October 7 2009   HISTORY:   ORDERING SYSTEM PROVIDED HISTORY: renal failure, weakness   TECHNOLOGIST PROVIDED HISTORY:   Reason for exam:->renal failure, weakness   Additional Contrast?->None   What reading provider will be dictating this exam?->CRC   FINDINGS:   Lower Chest: Reported separately. Organs: Gallbladder, liver, spleen, pancreas and adrenal glands demonstrate   nothing active. Tamika Collar is no evidence of hydronephrosis or renal parenchymal   wasting.  No space-occupying renal lesions or calcified stones are evident. GI/Bowel: Normal appendix. Tamika Collar is sigmoid diverticulosis. Pelvis: Nothing active. Peritoneum/Retroperitoneum: Nothing active. Bones/Soft Tissues: Nothing active.       Impression   No evidence of urinary tract obstruction or other demonstrable specific cause   of renal failure.  See above. EXAMINATION:   ONE XRAY VIEW OF THE CHEST   12/22/2020 3:45 am   COMPARISON:   30 November 2020   HISTORY:   ORDERING SYSTEM PROVIDED HISTORY: ams   TECHNOLOGIST PROVIDED HISTORY:   Reason for exam:->ams   What reading provider will be dictating this exam?->CRC   FINDINGS:   Left lower lobe pneumonia.  Heart and pulmonary vascularity are normal.   Neither costophrenic angle is blunted.       Impression   Left lower lobe pneumonia. Assessment & Plan:   1. AGUSTIN in the setting of dehydration, combined with COVID-19 infection. baseline serum Cr 0.6-0.8 mg/dl with eGFR=>60 ml/min. Serum Cr OA   4.9-->5.3-->5.5 ->6.0-->4.9>3.5  CT scan reveals no hydro, no calculi. FENa - 0.4% supports pre-renal etiology   PVR, MCR - 39.8  Pts wife conveys that pt does not want dialysis  Continue IVF D5W    2. Hypernatremia in the setting of dehydration. Na+160 OA-->156->155->153>150  Continue ivf  Monitor Q 12 hrs    3. DM type II  Controlled as evidenced by HgbA1c 5.3 on 10/25/2020  Primary following    4. Leukocytosis  Wbc 32.2>23  No fever  LLL infiltrates  Received vanc, cefepime, ceftriaxone in ER  On Vanc  ID following. 5. Altered Mental Status  Primary following     6. COVID 19 Infection  Received steroids  ID following.        Thank you Dr. Lolis Barker MD for allowing us to participate in care of Clarisa Ortega Sun,APRN-CNS  3:15 PM  12/25/2020     Pt not examined due to covid agree with above  Na improving with ivf  Cr starting to improve  Joel Palacios

## 2020-12-25 NOTE — PROGRESS NOTES
Chart reviewed. Overall poor prognosis. Code status established. Received 1 dose of IV morphine 1mg yesterday. Less tachypneic. Palliative available if needed.     Derrick Carpio DO

## 2020-12-25 NOTE — PROGRESS NOTES
Pharmacy Consultation Note  (Antibiotic Dosing and Monitoring)    Initial consult date:   Consulting physician: Dr. Cassy Deras  Drug: Vancomycin  Indication: Pneumonia, recent hospitalization     Age/  Gender Height Weight IBW Dosing weight  Allergy Information   65 y.o./male 6' 2\" (188 cm) 185 lb (83.9 kg)     Ideal body weight: 82.2 kg (181 lb 3.5 oz)  Adjusted ideal body weight: 82.9 kg (182 lb 11.7 oz)  83 kg  Bactrim [sulfamethoxazole-trimethoprim]      Temp (24hrs), Av.6 °F (35.9 °C), Min:96 °F (35.6 °C), Max:97.2 °F (36.2 °C)          Date  WBC BUN SCr CrCl  (mL/min) Drug/Dose Time   Given Level(s)   (Time) Comments    32.2 157 5.5     Vancomycin 1750 mg IV X 1  1317      - 164 5.5  X X Random 15.5 @ 0415     23.6 170 4.9 18 Vancomycin 1250 mg IV X 1 1016      23.4 141 3.5 25 X  Random 21 @ 0600          Intake/Output Summary (Last 24 hours) at 2020 0840  Last data filed at 2020 0429  Gross per 24 hour   Intake 0 ml   Output 1650 ml   Net -1650 ml       Historical Cultures:  Organism   Date Value Ref Range Status   2020 Enterococcus faecalis (A)  Final     No results for input(s): BC in the last 72 hours. Cultures:  available culture and sensitivity results were reviewed in [facility]    Assessment:  · 60 y.o. male has been initiated Vancomycin.   · Goal trough level = 15-20 mcg/mL  · Pt currently in AGUSTIN  · Pts wife conveys that pt does not want dialysis  · Random level from =21 mcg/mL      Plan:  · No vancomycin today  · Will dose by levels - random level with AM labs  · Monitor renal function   · Clinical pharmacy to follow      Mahendra Ramirez PharmD, BCPS 2020 8:44 AM

## 2020-12-25 NOTE — PROGRESS NOTES
BILITOT 0.8 12/25/2020    ALKPHOS 218 12/25/2020    AST 38 12/25/2020    ALT 70 12/25/2020     TSH:    Lab Results   Component Value Date    TSH 0.958 10/29/2020     FERRITIN:    Lab Results   Component Value Date    FERRITIN 1,489 12/22/2020     Fibrinogen Level:  No components found for: FIB     PRO-BNP:   Lab Results   Component Value Date    PROBNP 1,385 (H) 12/22/2020    PROBNP 120 11/30/2020      ABGs:   Lab Results   Component Value Date    PH 7.403 12/22/2020    PO2 78.4 12/22/2020    PCO2 33.5 12/22/2020     Hemoglobin A1C: No components found for: HGBA1C    IMAGING:  Imaging tests were completed and reviewed and discussed radiology and care team involved and reveals     Ct Head Wo Contrast    Result Date: 12/22/2020  EXAMINATION: CT OF THE HEAD WITHOUT CONTRAST  12/22/2020 3:53 am TECHNIQUE: CT of the head was performed without the administration of intravenous contrast. Dose modulation, iterative reconstruction, and/or weight based adjustment of the mA/kV was utilized to reduce the radiation dose to as low as reasonably achievable. COMPARISON: None. HISTORY: ORDERING SYSTEM PROVIDED HISTORY: AMS TECHNOLOGIST PROVIDED HISTORY: Reason for exam:->AMS Has a \"code stroke\" or \"stroke alert\" been called? ->No What reading provider will be dictating this exam?->CRC FINDINGS: Stable extensive right-sided encephalomalacia likely with some minimal dystrophic calcification. No acute hemorrhage, mass or midline shift. Stable caliber of ventricles and sulci. Unremarkable bony calvarium. No new abnormal findings. Encephalomalacia on the right as before.     Ct Head Wo Contrast    Result Date: 11/30/2020 EXAMINATION: CT OF THE HEAD WITHOUT CONTRAST  11/30/2020 12:39 pm TECHNIQUE: CT of the head was performed without the administration of intravenous contrast. Dose modulation, iterative reconstruction, and/or weight based adjustment of the mA/kV was utilized to reduce the radiation dose to as low as reasonably achievable. COMPARISON: 10/28/2020 HISTORY: ORDERING SYSTEM PROVIDED HISTORY: fall at [facility] TECHNOLOGIST PROVIDED HISTORY: Has a \"code stroke\" or \"stroke alert\" been called? ->No Reason for exam:->fall at [facility] What reading provider will be dictating this exam?->CRC FINDINGS: BRAIN/VENTRICLES: No acute intracranial hemorrhage or cortical based infarct. Extensive encephalomalacia within the right MCA distribution secondary to remote infarct. No mass effect or midline shift. No abnormal extra-axial fluid collections. The ventricles are intact without features of hydrocephalus. ORBITS: The visualized portion of the orbits demonstrate no acute abnormality. SINUSES: The visualized paranasal sinuses and mastoid air cells demonstrate no acute abnormality. SOFT TISSUES/SKULL:  No acute abnormality of the visualized skull or soft tissues. No acute intracranial abnormality.     Ct Chest Wo Contrast    Result Date: 12/22/2020 EXAMINATION: CT OF THE CHEST WITHOUT CONTRAST 12/22/2020 3:53 am TECHNIQUE: CT of the chest was performed without the administration of intravenous contrast. Multiplanar reformatted images are provided for review. Dose modulation, iterative reconstruction, and/or weight based adjustment of the mA/kV was utilized to reduce the radiation dose to as low as reasonably achievable. COMPARISON: October 7, 2009 HISTORY: ORDERING SYSTEM PROVIDED HISTORY: sob, hypoxia TECHNOLOGIST PROVIDED HISTORY: Reason for exam:->sob, hypoxia What reading provider will be dictating this exam?->CRC FINDINGS: There is left lower lobe pneumonia and there is opacity at the right lower lobe which is most likely atelectasis. Heart size is normal with some coronary artery calcification. The caliber of the thoracic aorta is within normal limits. No mediastinal adenopathy is present. No active process evident in the upper abdomen. Chest wall is unremarkable. Left lower lobe pneumonia. Opacity at the right lower lobe is likely atelectasis. Fl Modified Barium Swallow W Video  Result Date: 12/2/2020  Swallowing dysfunction as described above including deep penetration of thin liquid barium and silent aspiration of nectar. Please see separate speech pathology report for full discussion of findings and recommendations. Assessment:   1. COVID-19 infection,   2. Sepsis,   3. Aphasia, CVA chronic  4. Encephalomalacia  5. HCAP, Left lower lobe pneumonia. 6. hyponatremia,   7. acute kidney injury,   8. Lactic acidosis        Plan:   1. Will start to wean decadron   2. Tocilizumab to get given (max dose is 800), give 1/2 and look for response ? If was done  3. His main issue is sepsis not CoVID, on treatment  4. ID consulted  5. On Abx per ID  6. Nephrology correcting volume dehydration  7.  Nutritional assessment needed    Enedina Gao MPH, Bobby Montalvo  Professor of Medicine

## 2020-12-25 NOTE — PROGRESS NOTES
Subjective:    Chief complaint:    More alert, awake  Denies any new complaints  Wife is by the bedside      Objective:    /76   Pulse 96   Temp 97 °F (36.1 °C) (Temporal)   Resp 16   Ht 6' 2\" (1.88 m)   Wt 185 lb (83.9 kg)   SpO2 92%   BMI 23.75 kg/m²   General : Awake, more alert  Heart:  RRR, no murmurs, gallops, or rubs. Lungs:  CTA bilaterally, no wheeze, rales or rhonchi  Abd: bowel sounds present, nontender, nondistended, no masses  Extrem:  No clubbing, cyanosis, or edema    CBC:   Lab Results   Component Value Date    WBC 23.4 12/25/2020    RBC 4.24 12/25/2020    HGB 12.8 12/25/2020    HCT 39.6 12/25/2020    MCV 93.4 12/25/2020    MCH 30.2 12/25/2020    MCHC 32.3 12/25/2020    RDW 14.4 12/25/2020     12/25/2020    MPV 13.1 12/25/2020     BMP:    Lab Results   Component Value Date     12/25/2020    K 3.9 12/25/2020     12/25/2020    CO2 21 12/25/2020     12/25/2020    LABALBU 2.4 12/25/2020    LABALBU 4.8 04/27/2012    CREATININE 3.5 12/25/2020    CALCIUM 8.6 12/25/2020    GFRAA 21 12/25/2020    LABGLOM 18 12/25/2020    GLUCOSE 129 12/25/2020    GLUCOSE 112 04/27/2012     PT/INR:    Lab Results   Component Value Date    PROTIME 14.6 12/22/2020    INR 1.3 12/22/2020     Troponin:    Lab Results   Component Value Date    TROPONINI 0.10 12/22/2020       No results for input(s): LABURIN in the last 72 hours. No results for input(s): BC in the last 72 hours. No results for input(s): Dillon Crumble in the last 72 hours.       Current Facility-Administered Medications:     morphine (PF) injection 1 mg, 1 mg, Intravenous, Q2H PRN, Catalina Rowland DO, 1 mg at 12/25/20 1217    dextrose 5 % solution, , Intravenous, Continuous, Adelina Ronquillo DO, Last Rate: 125 mL/hr at 12/25/20 1217, New Bag at 12/25/20 1217    heparin flush 100 UNIT/ML injection 100 Units, 100 Units, Intracatheter, PRN, Leilani Tran MD, 100 Units at 12/24/20 2021

## 2020-12-26 NOTE — PROGRESS NOTES
Pharmacy Consultation Note  (Antibiotic Dosing and Monitoring)    Initial consult date:   Consulting physician: Dr. Matthias Menjivar  Drug: Vancomycin  Indication: Pneumonia, recent hospitalization     Age/  Gender Height Weight IBW Dosing weight  Allergy Information   65 y.o./male 6' 2\" (188 cm) 185 lb (83.9 kg)     Ideal body weight: 82.2 kg (181 lb 3.5 oz)  Adjusted ideal body weight: 82.9 kg (182 lb 11.7 oz)  83 kg  Bactrim [sulfamethoxazole-trimethoprim]      Temp (24hrs), Av.2 °F (36.2 °C), Min:96.3 °F (35.7 °C), Max:98.5 °F (36.9 °C)          Date  WBC BUN SCr CrCl  (mL/min) Drug/Dose Time   Given Level(s)   (Time) Comments    32.2 157 5.5     Vancomycin 1750 mg IV X 1  1317      - 164 5.5  X X Random 15.5 @ 0415     23.6 170 4.9 18 Vancomycin 1250 mg IV X 1 1016      23.4 141 3.5 25 X  Random 21 @ 0600     21.8 112 2.4 36  Vancomycin 1000mg IV x 1  <1000> Random 12.6 @ 0527                                Intake/Output Summary (Last 24 hours) at 2020 0916  Last data filed at 2020 0524  Gross per 24 hour   Intake    Output 2100 ml   Net -2100 ml       Historical Cultures:  Organism   Date Value Ref Range Status   2020 Enterococcus faecalis (A)  Final     No results for input(s): BC in the last 72 hours. Cultures:  available culture and sensitivity results were reviewed in [facility]    Assessment:  · 60 y.o. male has been initiated Vancomycin.   · Goal trough level = 15-20 mcg/mL  · Pt currently in AGUSTIN  · Pts wife conveys that pt does not want dialysis  · Random level from = 12.6 mcg/mL      Plan:  · Vancomycin 1000mg x 1   · Will dose by levels - random level with AM labs  · Monitor renal function   · Clinical pharmacy to follow      Werner Butts PharmD, BCPS 2020 9:16 AM

## 2020-12-26 NOTE — PROGRESS NOTES
[place]  Department of Internal Medicine  Division of Pulmonary, Critical Care and Sleep Medicine  Progress Note    Toshia Colon DO, Tosha Gates, Keysha Perez MD, [facility]    Patient: Gianna Sotomayor  MRN: [id number]  : 1955    Encounter Time: 9:23 AM     Date of Admission: 2020  3:15 AM    Primary Care Physician: Dana Ahumada MD    Reason for Consultation: Maddie Cotton and Sepsis from [facility]    SUJBECTIVE:    Slow improvement per family  Looks the same to me,labs the same  WBC 21 K    OBJECTIVE:     PHYSICAL EXAM:   VITALS:     Intake/Output Summary (Last 24 hours) at 2020 0923  Last data filed at 2020 0524  Gross per 24 hour   Intake    Output 2100 ml   Net -2100 ml      Vitals:    20 1715 20 1945 20 0851   BP:    121/74   Pulse:   101 103   Resp: 16 16 15 18   Temp:   96.3 °F (35.7 °C) 96.8 °F (36 °C)   TempSrc:   Axillary Temporal   SpO2: (!) 89% 93% 91% 95%   Weight:       Height:           CONSTITUTIONAL:   Alert, NAD  SKIN:     Pale skin discoloration  HEENT:     No thrush  NECK:    No bruits, No JVP apprechiated  CV:      Sinus,  No murmur, No rubs, No gallops  PULMONARY:   Couse BS,  No Rhonchi      No noted egophony  ABDOMEN:     Soft, non-tender. BS normal. No R/R/G  EXT:    No deformities . No clubbing.       + lower extremity edema, No venous stasis  PULSE:   Appears equal and palpable.   PSYCHIATRIC:  aphasia  MS:    Gross weakness  NEUROLOGIC:   Focal     DATA: IMAGING & TESTING:     LABORATORY TESTS:    CBC:   Lab Results   Component Value Date    WBC 21.8 2020    RBC 4.57 2020    HGB 13.4 2020    HCT 42.9 2020    MCV 93.9 2020    MCH 29.3 2020    MCHC 31.2 2020    RDW 14.2 2020     2020    MPV 13.7 2020     CMP:    Lab Results   Component Value Date     2020    K 3.9 2020     2020 CO2 22 12/26/2020     12/26/2020    CREATININE 2.4 12/26/2020    GFRAA 33 12/26/2020    LABGLOM 27 12/26/2020    GLUCOSE 112 12/26/2020    GLUCOSE 112 04/27/2012    PROT 5.9 12/26/2020    LABALBU 2.6 12/26/2020    LABALBU 4.8 04/27/2012    CALCIUM 9.0 12/26/2020    BILITOT 1.0 12/26/2020    ALKPHOS 244 12/26/2020    AST 51 12/26/2020    ALT 65 12/26/2020     TSH:    Lab Results   Component Value Date    TSH 0.958 10/29/2020     FERRITIN:    Lab Results   Component Value Date    FERRITIN 1,489 12/22/2020     Fibrinogen Level:  No components found for: FIB     PRO-BNP:   Lab Results   Component Value Date    PROBNP 1,385 (H) 12/22/2020    PROBNP 120 11/30/2020      ABGs:   Lab Results   Component Value Date    PH 7.403 12/22/2020    PO2 78.4 12/22/2020    PCO2 33.5 12/22/2020     Hemoglobin A1C: No components found for: HGBA1C    IMAGING:  Imaging tests were completed and reviewed and discussed radiology and care team involved and reveals     Ct Head Wo Contrast    Result Date: 12/22/2020  EXAMINATION: CT OF THE HEAD WITHOUT CONTRAST  12/22/2020 3:53 am TECHNIQUE: CT of the head was performed without the administration of intravenous contrast. Dose modulation, iterative reconstruction, and/or weight based adjustment of the mA/kV was utilized to reduce the radiation dose to as low as reasonably achievable. COMPARISON: None. HISTORY: ORDERING SYSTEM PROVIDED HISTORY: AMS TECHNOLOGIST PROVIDED HISTORY: Reason for exam:->[facility] Has a \"code stroke\" or \"stroke alert\" been called? ->No What reading provider will be dictating this exam?->CRC FINDINGS: Stable extensive right-sided encephalomalacia likely with some minimal dystrophic calcification. No acute hemorrhage, mass or midline shift. Stable caliber of ventricles and sulci. Unremarkable bony calvarium. No new abnormal findings. Encephalomalacia on the right as before.     Ct Head Wo Contrast    Result Date: 11/30/2020 EXAMINATION: CT OF THE HEAD WITHOUT CONTRAST  11/30/2020 12:39 pm TECHNIQUE: CT of the head was performed without the administration of intravenous contrast. Dose modulation, iterative reconstruction, and/or weight based adjustment of the mA/kV was utilized to reduce the radiation dose to as low as reasonably achievable. COMPARISON: 10/28/2020 HISTORY: ORDERING SYSTEM PROVIDED HISTORY: fall at [facility] TECHNOLOGIST PROVIDED HISTORY: Has a \"code stroke\" or \"stroke alert\" been called? ->No Reason for exam:->fall at [facility] What reading provider will be dictating this exam?->CRC FINDINGS: BRAIN/VENTRICLES: No acute intracranial hemorrhage or cortical based infarct. Extensive encephalomalacia within the right MCA distribution secondary to remote infarct. No mass effect or midline shift. No abnormal extra-axial fluid collections. The ventricles are intact without features of hydrocephalus. ORBITS: The visualized portion of the orbits demonstrate no acute abnormality. SINUSES: The visualized paranasal sinuses and mastoid air cells demonstrate no acute abnormality. SOFT TISSUES/SKULL:  No acute abnormality of the visualized skull or soft tissues. No acute intracranial abnormality.     Ct Chest Wo Contrast    Result Date: 12/22/2020 Pulmonary, Critical Care and Sleep Medicine

## 2020-12-26 NOTE — PROGRESS NOTES
Chart reviewed. Per primary notes felt to be clinically improving. Patient/wife don't want aggressive measures but have wanted continued supportive care as long as patient does not deteriorate. Will continue to follow.     55 Fabi Lu

## 2020-12-26 NOTE — PROGRESS NOTES
 aspirin chewable tablet 81 mg  81 mg Oral Daily Noah Mooney MD   81 mg at 12/26/20 1038    bisacodyl (DULCOLAX) suppository 10 mg  10 mg Rectal Daily PRN Noah Mooney MD        magnesium hydroxide (MILK OF MAGNESIA) 400 MG/5ML suspension 30 mL  30 mL Oral Daily PRN Noah Mooney MD        traMADol Memo Becker) tablet 50 mg  50 mg Oral Q4H PRN Noah Mooney MD        sertraline (ZOLOFT) tablet 50 mg  50 mg Oral Daily Noah Mooney MD   50 mg at 12/26/20 1038    acetaminophen (TYLENOL) tablet 650 mg  650 mg Oral Q6H PRN Noah Mooney MD        Or   [facility] acetaminophen (TYLENOL) suppository 650 mg  650 mg Rectal Q6H PRN Noah Mooney MD        Vitamin D (CHOLECALCIFEROL) tablet 2,000 Units  2,000 Units Oral Daily Noah Mooney MD   2,000 Units at 12/26/20 1038    vancomycin (VANCOCIN) intermittent dosing (placeholder)   Other RX Placeholder Alex Aalnis MD         REVIEW OF SYSTEMS: could not be obtained       PHYSICAL EXAM:      Vitals:     /74   Pulse 103   Temp 96.8 °F (36 °C) (Temporal)   Resp 18   Ht 6' 2\" (1.88 m)   Wt 185 lb (83.9 kg)   SpO2 95%   BMI 23.75 kg/m²     General Appearance:     More awake       Head:    Normocephalic, atraumatic   Eyes:    +  pallor, no icterus,   Ears:    No obvious deformity or drainage.    Nose:   No nasal drainage   Throat:   Mucosa dry    Neck:   Supple, no lymphadenopathy   Lungs:     Decreased breath sound,    Heart:    Tachycardic    Abdomen:     Soft,not distended, bowel sounds present    Extremities:   No edema,    Pulses:   Dorsalis pedis palpable    Skin:   no rashes or lesions   CBC with Differential:      Lab Results   Component Value Date    WBC 21.8 12/26/2020    RBC 4.57 12/26/2020    HGB 13.4 12/26/2020    HCT 42.9 12/26/2020     12/26/2020    MCV 93.9 12/26/2020    MCH 29.3 12/26/2020    MCHC 31.2 12/26/2020    RDW 14.2 12/26/2020    SEGSPCT 21 04/27/2012 1. Respiratory failure ,on  13 L of high flow oxygen , saturation 96 %   2. COVID 19 pneumonia ( Severe ) / Pneumonia / Sepsis   3. Leukocytosis, lactic acidosis, elevated procalcitonin   4. Transaminitis       RECOMMENDATIONS:      1. Decadron 6 mg IV q 12 hrs   2. Remdesivir not given due to renal failure   3. Stop vancomycin and cefepime   4. Meropenem 500 mg IV q 24 hrs   5.  Palliative following - transfer to hospice or feeding tube - family to decide

## 2020-12-26 NOTE — PLAN OF CARE
Problem: Falls - Risk of:  Goal: Will remain free from falls  Description: Will remain free from falls  Outcome: Met This Shift     Problem: Airway Clearance - Ineffective  Goal: Achieve or maintain patent airway  Outcome: Met This Shift     Problem: Gas Exchange - Impaired  Goal: Absence of hypoxia  Outcome: Met This Shift     Problem: Breathing Pattern - Ineffective  Goal: Ability to achieve and maintain a regular respiratory rate  Outcome: Met This Shift     Problem: Injury - Risk of, Physical Injury:  Goal: Will remain free from falls  Description: Will remain free from falls  Outcome: Met This Shift

## 2020-12-26 NOTE — PROGRESS NOTES
Subjective:    Chief complaint:    More alert, awake  Daughter is by the bedside    Objective:    /74   Pulse 103   Temp 96.8 °F (36 °C) (Temporal)   Resp 18   Ht 6' 2\" (1.88 m)   Wt 185 lb (83.9 kg)   SpO2 95%   BMI 23.75 kg/m²   General : Awake, more alert  Heart:  RRR, no murmurs, gallops, or rubs. Lungs:  CTA bilaterally, no wheeze, rales or rhonchi  Abd: bowel sounds present, nontender, nondistended, no masses  Extrem:  No clubbing, cyanosis, or edema    CBC:   Lab Results   Component Value Date    WBC 21.8 12/26/2020    RBC 4.57 12/26/2020    HGB 13.4 12/26/2020    HCT 42.9 12/26/2020    MCV 93.9 12/26/2020    MCH 29.3 12/26/2020    MCHC 31.2 12/26/2020    RDW 14.2 12/26/2020     12/26/2020    MPV 13.7 12/26/2020     BMP:    Lab Results   Component Value Date     12/26/2020    K 3.9 12/26/2020     12/26/2020    CO2 22 12/26/2020     12/26/2020    LABALBU 2.6 12/26/2020    LABALBU 4.8 04/27/2012    CREATININE 2.4 12/26/2020    CALCIUM 9.0 12/26/2020    GFRAA 33 12/26/2020    LABGLOM 27 12/26/2020    GLUCOSE 112 12/26/2020    GLUCOSE 112 04/27/2012     PT/INR:    Lab Results   Component Value Date    PROTIME 14.6 12/22/2020    INR 1.3 12/22/2020     Troponin:    Lab Results   Component Value Date    TROPONINI 0.10 12/22/2020       No results for input(s): LABURIN in the last 72 hours. No results for input(s): BC in the last 72 hours. No results for input(s): [place] Daft in the last 72 hours.       Current Facility-Administered Medications:     meropenem (MERREM) 500 mg IVPB extended (mini-bag), 500 mg, Intravenous, Q24H **AND** 0.9 % sodium chloride infusion (meropenem flush), , Intravenous, Q24H, Cristobal Prince MD    dexamethasone (DECADRON) tablet 3 mg, 3 mg, Oral, Daily, Ronnie Phillip DO, 3 mg at 12/26/20 1038    morphine (PF) injection 1 mg, 1 mg, Intravenous, Q2H PRN, Dash Knight DO, 1 mg at 12/25/20 1210   dextrose 5 % solution, , Intravenous, Continuous, Stephanie Martínez, DO, Last Rate: 125 mL/hr at 12/26/20 0513, New Bag at 12/26/20 0513    heparin flush 100 UNIT/ML injection 100 Units, 100 Units, Intracatheter, PRN, Rosanna Mcghee MD, 100 Units at 12/25/20 2032    mineral oil-hydrophilic petrolatum (HYDROPHOR) ointment, , Topical, BID, Given at 12/26/20 1037 **AND** mineral oil-hydrophilic petrolatum (HYDROPHOR) ointment, , Topical, TID PRN, Rosanna Mcghee MD    sodium chloride flush 0.9 % injection 10 mL, 10 mL, Intravenous, 2 times per day, Rosanna Mcghee MD, 10 mL at 12/26/20 1038    sodium chloride flush 0.9 % injection 10 mL, 10 mL, Intravenous, PRN, Rosanna Mcghee MD    heparin (porcine) injection 5,000 Units, 5,000 Units, Subcutaneous, 3 times per day, Rosanna Mcghee MD, 5,000 Units at 12/26/20 0610    levothyroxine (SYNTHROID) tablet 50 mcg, 50 mcg, Oral, Daily, Rosanna Mcghee MD, 50 mcg at 12/26/20 9218    aspirin chewable tablet 81 mg, 81 mg, Oral, Daily, Rosanna Mcghee MD, 81 mg at 12/26/20 1038    bisacodyl (DULCOLAX) suppository 10 mg, 10 mg, Rectal, Daily PRN, Rosanna Mcghee MD    magnesium hydroxide (MILK OF MAGNESIA) 400 MG/5ML suspension 30 mL, 30 mL, Oral, Daily PRN, Rosanna Mcghee MD    traMADol (ULTRAM) tablet 50 mg, 50 mg, Oral, Q4H PRN, Rosanna Mcghee MD    sertraline (ZOLOFT) tablet 50 mg, 50 mg, Oral, Daily, Rosanna Mcghee MD, 50 mg at 12/26/20 1038    acetaminophen (TYLENOL) tablet 650 mg, 650 mg, Oral, Q6H PRN **OR** acetaminophen (TYLENOL) suppository 650 mg, 650 mg, Rectal, Q6H PRN, Rosanna Mcghee MD    Vitamin D (CHOLECALCIFEROL) tablet 2,000 Units, 2,000 Units, Oral, Daily, Rosanna Mcghee MD, 2,000 Units at 12/26/20 1038    vancomycin (VANCOCIN) intermittent dosing (placeholder), , Other, RX Placeholder, Rosanna Mcghee MD    DIET CARDIAC; Dietary Nutrition Supplements: Diabetic Oral Supplement  Dietary Nutrition Supplements: Other Oral Supplement (see comment)    XR CHEST PORTABLE   Final Result   Left lower lobe pneumonia. CT Head WO Contrast   Final Result   No new abnormal findings. Encephalomalacia on the right as before. CT ABDOMEN PELVIS WO CONTRAST Additional Contrast? None   Final Result   No evidence of urinary tract obstruction or other demonstrable specific cause   of renal failure. See above. CT CHEST WO CONTRAST   Final Result   Left lower lobe pneumonia. Opacity at the right lower lobe is likely   atelectasis. Assessment:    Active Problems:    COVID-19    Altered mental status    Palliative care by specialist    Goals of care, counseling/discussion  Resolved Problems:    * No resolved hospital problems. *  Recent stroke  Underlying aphasia  Acute kidney injury  Hypernatremia  Transaminitis      Plan:    He is on high flow oxygen  Creatinine is 2.4  Hemoglobin is 13.4  leukocytosis slowly improving  Blood cultures negative to date  Family still contemplating discharge destination    Maria Isabel Trejokevin  1:50 PM  12/26/2020    NOTE: This report was transcribed using voice recognition software.  Every effort was made to ensure accuracy; however, inadvertent transcription errors may be present

## 2020-12-27 NOTE — PROGRESS NOTES
Department of Internal Medicine  Infectious Diseases  Progress Note      C/C :   COVID 19 , leukocytosis ,pneumonia     Discussed with pt's wife    Pt is more awake and alert   Afebrile   No resp distress       Current Facility-Administered Medications   Medication Dose Route Frequency Provider Last Rate Last Admin    lidocaine 1 % injection 5 mL  5 mL Intradermal Once Sary Casiano MD        sodium chloride flush 0.9 % injection 10 mL  10 mL Intravenous PRN Sary Casiano MD        heparin flush 100 UNIT/ML injection 100 Units  1 mL Intravenous 2 times per day Sary Casiano MD        heparin flush 100 UNIT/ML injection 100 Units  1 mL Intracatheter PRN Sary Casiano MD        dronabinol (MARINOL) capsule 5 mg  5 mg Oral BID Sary Casiano MD        meropenem (MERREM) 500 mg IVPB extended (mini-bag)  500 mg Intravenous Q24H Kristyn Hoffmann MD   Stopped at 12/26/20 2039    And    0.9 % sodium chloride infusion (meropenem flush)   Intravenous Q24H Kristyn Hoffmann MD   Stopped at 12/26/20 2039    morphine (PF) injection 1 mg  1 mg Intravenous Q2H PRN Francine Seat, DO   1 mg at 12/25/20 1217    dextrose 5 % solution   Intravenous Continuous Sherel Fazal Kenji,  mL/hr at 12/27/20 0041 New Bag at 12/27/20 0041    heparin flush 100 UNIT/ML injection 100 Units  100 Units Intracatheter PRN Sary Casiano MD   100 Units at 12/26/20 2037    mineral oil-hydrophilic petrolatum (HYDROPHOR) ointment   Topical BID Sary Caisano MD   Given at 12/27/20 1238    And    mineral oil-hydrophilic petrolatum (HYDROPHOR) ointment   Topical TID PRN Sary Casiano MD        sodium chloride flush 0.9 % injection 10 mL  10 mL Intravenous 2 times per day Sary Casiano MD   10 mL at 12/26/20 2037    sodium chloride flush 0.9 % injection 10 mL  10 mL Intravenous PRN Sary Casiano MD  heparin (porcine) injection 5,000 Units  5,000 Units Subcutaneous 3 times per day Trevon Morin MD   5,000 Units at 12/27/20 0527    levothyroxine (SYNTHROID) tablet 50 mcg  50 mcg Oral Daily Trevon Morin MD   50 mcg at 12/26/20 2663    aspirin chewable tablet 81 mg  81 mg Oral Daily Trevon Morin MD   81 mg at 12/26/20 1038    bisacodyl (DULCOLAX) suppository 10 mg  10 mg Rectal Daily PRN Trevon Morin MD        magnesium hydroxide (MILK OF MAGNESIA) 400 MG/5ML suspension 30 mL  30 mL Oral Daily PRN Trevon Morin MD        traMADol Toribio Mor) tablet 50 mg  50 mg Oral Q4H PRN Trevon Morin MD        sertraline (ZOLOFT) tablet 50 mg  50 mg Oral Daily Trevon Morin MD   50 mg at 12/26/20 1038    acetaminophen (TYLENOL) tablet 650 mg  650 mg Oral Q6H PRN Trevon Morin MD        Or   [facility] acetaminophen (TYLENOL) suppository 650 mg  650 mg Rectal Q6H PRN Trevon Morin MD        Vitamin D (CHOLECALCIFEROL) tablet 2,000 Units  2,000 Units Oral Daily Trevon Morin MD   2,000 Units at 12/26/20 1038     REVIEW OF SYSTEMS: could not be obtained       PHYSICAL EXAM:      Vitals:     /63   Pulse 98   Temp 98.2 °F (36.8 °C) (Temporal)   Resp 26   Ht 6' 2\" (1.88 m)   Wt 185 lb (83.9 kg)   SpO2 93%   BMI 23.75 kg/m²     General Appearance:     More awake       Head:    Normocephalic, atraumatic   Eyes:    +  pallor, no icterus,   Ears:    No obvious deformity or drainage.    Nose:   No nasal drainage   Throat:   Mucosa dry    Neck:   Supple, no lymphadenopathy   Lungs:     Decreased breath sound,    Heart:    Tachycardic    Abdomen:     Soft,not distended, bowel sounds present    Extremities:   No edema,    Pulses:   Dorsalis pedis palpable    Skin:   no rashes or lesions   CBC with Differential:      Lab Results   Component Value Date    WBC 21.8 12/26/2020    RBC 4.57 12/26/2020    HGB 13.4 12/26/2020    HCT 42.9 12/26/2020  12/26/2020    MCV 93.9 12/26/2020    MCH 29.3 12/26/2020    MCHC 31.2 12/26/2020    RDW 14.2 12/26/2020    SEGSPCT 21 04/27/2012    METASPCT 0.9 12/24/2020    LYMPHOPCT 3.0 12/26/2020    MONOPCT 4.8 12/26/2020    BASOPCT 0.2 12/26/2020    MONOSABS 1.05 12/26/2020    LYMPHSABS 0.66 12/26/2020    EOSABS 0.30 12/26/2020    BASOSABS 0.05 12/26/2020       CMP     Lab Results   Component Value Date     12/26/2020    K 3.9 12/26/2020     12/26/2020    CO2 22 12/26/2020     12/26/2020    CREATININE 2.4 12/26/2020    GFRAA 33 12/26/2020    LABGLOM 27 12/26/2020    GLUCOSE 112 12/26/2020    GLUCOSE 112 04/27/2012    PROT 5.9 12/26/2020    LABALBU 2.6 12/26/2020    LABALBU 4.8 04/27/2012    CALCIUM 9.0 12/26/2020    BILITOT 1.0 12/26/2020    ALKPHOS 244 12/26/2020    AST 51 12/26/2020    ALT 65 12/26/2020       Hepatic Function Panel:      Lab Results   Component Value Date    ALKPHOS 244 12/26/2020    ALT 65 12/26/2020    AST 51 12/26/2020    PROT 5.9 12/26/2020    BILITOT 1.0 12/26/2020    LABALBU 2.6 12/26/2020    LABALBU 4.8 04/27/2012       PT/INR:    Lab Results   Component Value Date    PROTIME 14.6 12/22/2020    INR 1.3 12/22/2020       TSH:    Lab Results   Component Value Date    TSH 0.958 10/29/2020       U/A:      Lab Results   Component Value Date    COLORU Yellow 12/22/2020    PHUR 5.0 12/22/2020    WBCUA 1-3 12/22/2020    RBCUA 0-1 12/22/2020    BACTERIA MODERATE 12/22/2020    CLARITYU Clear 12/22/2020    SPECGRAV >=1.030 12/22/2020    LEUKOCYTESUR TRACE 12/22/2020    UROBILINOGEN 1.0 12/22/2020    BILIRUBINUR MODERATE 12/22/2020    BLOODU SMALL 12/22/2020    GLUCOSEU 100 12/22/2020    AMORPHOUS FEW 12/22/2020       ABG:  No results found for: NHN2ZSP, BEART, H0YCCNST, PHART, THGBART, LHC8QZJ, PO2ART, ZZQ0RRV    MICROBIOLOGY:    Blood culture - pending     Urine Culture -     SARS-CoV-2, PCR DETECTEDAbnormal        Radiology :      CT scan of chest - Left lower lobe pneumonia.  Opacity at the right lower lobe is likely   Atelectasis. IMPRESSION:     1. Respiratory failure ,on  13 L of high flow oxygen , saturation 96 %   2. COVID 19 pneumonia ( Severe ) / Pneumonia / Sepsis   3. Leukocytosis, lactic acidosis, elevated procalcitonin   4. Transaminitis       RECOMMENDATIONS:      1. Decadron 6 mg IV q 12 hrs   2. Remdesivir not given due to renal failure   3. Meropenem 500 mg IV q 24 hrs   4. Midline insertion / check CBC with diff   5.  Palliative following - transfer to hospice or feeding tube -swallow eval ordered

## 2020-12-27 NOTE — PROGRESS NOTES
Pharmacy Consultation Note  (Antibiotic Dosing and Monitoring)    Initial consult date: 12/22  Consulting physician: Dr. Manuel Rodriguez  Drug: Vancomycin  Indication: Pneumonia, recent hospitalization       Plan:  Vancomycin has been discontinued   Clinical Pharmacy to sign-off  Physician to re-consult pharmacy if future dosing is needed    Thank you for the consult,        Mirella CervantesD, BCPS 12/27/2020 9:02 AM

## 2020-12-27 NOTE — PROGRESS NOTES
[facility] [place]  Department of Internal Medicine  Division of Pulmonary, Critical Care and Sleep Medicine  Progress Note    Ketan Louise DO, Linder Alert, David Flores MD, [facility]    Patient: Lisandro Garcia  MRN: [id number]  : 1955    Encounter Time: 10:16 AM     Date of Admission: 2020  3:15 AM    Primary Care Physician: Willa Rosario MD    Reason for Consultation: [place] and Sepsis from [facility]    SUJBECTIVE:    Slow improvement   Interacting with nods and yes  WBC pending    OBJECTIVE:     PHYSICAL EXAM:   VITALS:     Intake/Output Summary (Last 24 hours) at 2020 1016  Last data filed at 2020 0457  Gross per 24 hour   Intake    Output 1800 ml   Net -1800 ml      Vitals:    20 1658 20 2030 20 2131 20 0907   BP: 119/74 110/65  112/63   Pulse: 94 97  98   Resp:    Temp: 97.7 °F (36.5 °C) 97.4 °F (36.3 °C)  98.2 °F (36.8 °C)   TempSrc: Temporal Temporal  Temporal   SpO2: 97% 96% 99% 93%   Weight:       Height:           CONSTITUTIONAL:   Alert, NAD  SKIN:     Pale skin discoloration  HEENT:     No thrush  NECK:    No bruits, No JVP apprechiated  CV:      Sinus,  No murmur, No rubs, No gallops  PULMONARY:   Couse BS,  No Rhonchi      No noted egophony  ABDOMEN:     Soft, non-tender. BS normal. No R/R/G  EXT:    No deformities . No clubbing.       + lower extremity edema, No venous stasis  PULSE:   Appears equal and palpable.   PSYCHIATRIC:  aphasia  MS:    Gross weakness  NEUROLOGIC:   Focal     DATA: IMAGING & TESTING:     LABORATORY TESTS:    CBC:   Lab Results   Component Value Date    WBC 21.8 2020    RBC 4.57 2020    HGB 13.4 2020    HCT 42.9 2020    MCV 93.9 2020    MCH 29.3 2020    MCHC 31.2 2020    RDW 14.2 2020     2020    MPV 13.7 2020     CMP:    Lab Results   Component Value Date     2020    K 3.9 2020  12/26/2020    CO2 22 12/26/2020     12/26/2020    CREATININE 2.4 12/26/2020    GFRAA 33 12/26/2020    LABGLOM 27 12/26/2020    GLUCOSE 112 12/26/2020    GLUCOSE 112 04/27/2012    PROT 5.9 12/26/2020    LABALBU 2.6 12/26/2020    LABALBU 4.8 04/27/2012    CALCIUM 9.0 12/26/2020    BILITOT 1.0 12/26/2020    ALKPHOS 244 12/26/2020    AST 51 12/26/2020    ALT 65 12/26/2020     TSH:    Lab Results   Component Value Date    TSH 0.958 10/29/2020     FERRITIN:    Lab Results   Component Value Date    FERRITIN 1,489 12/22/2020     Fibrinogen Level:  No components found for: FIB     PRO-BNP:   Lab Results   Component Value Date    PROBNP 1,385 (H) 12/22/2020    PROBNP 120 11/30/2020      ABGs:   Lab Results   Component Value Date    PH 7.403 12/22/2020    PO2 78.4 12/22/2020    PCO2 33.5 12/22/2020     Hemoglobin A1C: No components found for: HGBA1C    IMAGING:  Imaging tests were completed and reviewed and discussed radiology and care team involved and reveals     Ct Head Wo Contrast    Result Date: 12/22/2020  EXAMINATION: CT OF THE HEAD WITHOUT CONTRAST  12/22/2020 3:53 am TECHNIQUE: CT of the head was performed without the administration of intravenous contrast. Dose modulation, iterative reconstruction, and/or weight based adjustment of the mA/kV was utilized to reduce the radiation dose to as low as reasonably achievable. COMPARISON: None. HISTORY: ORDERING SYSTEM PROVIDED HISTORY: AMS TECHNOLOGIST PROVIDED HISTORY: Reason for exam:->AMS Has a \"code stroke\" or \"stroke alert\" been called? ->No What reading provider will be dictating this exam?->CRC FINDINGS: Stable extensive right-sided encephalomalacia likely with some minimal dystrophic calcification. No acute hemorrhage, mass or midline shift. Stable caliber of ventricles and sulci. Unremarkable bony calvarium. No new abnormal findings. Encephalomalacia on the right as before.     Ct Head Wo Contrast    Result Date: 11/30/2020 EXAMINATION: CT OF THE HEAD WITHOUT CONTRAST  11/30/2020 12:39 pm TECHNIQUE: CT of the head was performed without the administration of intravenous contrast. Dose modulation, iterative reconstruction, and/or weight based adjustment of the mA/kV was utilized to reduce the radiation dose to as low as reasonably achievable. COMPARISON: 10/28/2020 HISTORY: ORDERING SYSTEM PROVIDED HISTORY: fall at [facility] TECHNOLOGIST PROVIDED HISTORY: Has a \"code stroke\" or \"stroke alert\" been called? ->No Reason for exam:->fall at [facility] What reading provider will be dictating this exam?->CRC FINDINGS: BRAIN/VENTRICLES: No acute intracranial hemorrhage or cortical based infarct. Extensive encephalomalacia within the right MCA distribution secondary to remote infarct. No mass effect or midline shift. No abnormal extra-axial fluid collections. The ventricles are intact without features of hydrocephalus. ORBITS: The visualized portion of the orbits demonstrate no acute abnormality. SINUSES: The visualized paranasal sinuses and mastoid air cells demonstrate no acute abnormality. SOFT TISSUES/SKULL:  No acute abnormality of the visualized skull or soft tissues. No acute intracranial abnormality.     Ct Chest Wo Contrast    Result Date: 12/22/2020 EXAMINATION: CT OF THE CHEST WITHOUT CONTRAST 12/22/2020 3:53 am TECHNIQUE: CT of the chest was performed without the administration of intravenous contrast. Multiplanar reformatted images are provided for review. Dose modulation, iterative reconstruction, and/or weight based adjustment of the mA/kV was utilized to reduce the radiation dose to as low as reasonably achievable. COMPARISON: October 7, 2009 HISTORY: ORDERING SYSTEM PROVIDED HISTORY: sob, hypoxia TECHNOLOGIST PROVIDED HISTORY: Reason for exam:->sob, hypoxia What reading provider will be dictating this exam?->CRC FINDINGS: There is left lower lobe pneumonia and there is opacity at the right lower lobe which is most likely atelectasis. Heart size is normal with some coronary artery calcification. The caliber of the thoracic aorta is within normal limits. No mediastinal adenopathy is present. No active process evident in the upper abdomen. Chest wall is unremarkable. Left lower lobe pneumonia. Opacity at the right lower lobe is likely atelectasis. Fl Modified Barium Swallow W Video  Result Date: 12/2/2020  Swallowing dysfunction as described above including deep penetration of thin liquid barium and silent aspiration of nectar. Please see separate speech pathology report for full discussion of findings and recommendations. Assessment:   1. COVID-19 infection,   2. Sepsis,   3. Aphasia, CVA chronic  4. Encephalomalacia  5. HCAP, Left lower lobe pneumonia. 6. hyponatremia,   7. acute kidney injury,   8. Lactic acidosis        Plan:   1. Will stop decadron   2. Still on D5W @ 125 BMP pending  3. His main issue is sepsis not CoVID, on treatment  4. ID consulted  5. On Abx per ID  6. Nephrology correcting volume dehydration  7. Nutritional assessment needed  8.  Kiya Treadwell, MPH, Jaya Gun  Professor of Medicine  Pulmonary, Critical Care and Sleep Medicine

## 2020-12-27 NOTE — PROGRESS NOTES
RN attempted peripheral stick on patient and was unsuccessful.  IV team is on for midline placement and to get labs drawn with midline placement

## 2020-12-27 NOTE — PROGRESS NOTES
Nephrology Consult Note  Patient's Name: Paul Barry  11:43 AM  12/27/2020    Nephrologist: Dr. Nixon Palacios    Reason for Consult:  AGUSTIN & Hypernatremia  Requesting Physician:  Maki Pulliam MD    Chief Complaint:  AMS and SOB    History Obtained From:  past medical records    History of Present Ilness:  Per 12/22/20 Note:  Paul Barry is a 60 y.o. 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 He had a recent hospitalization 11/30-12/3/2020 for sepsis and severe malnutrition. He had recently had a stroke and was at a rehab, where he suffered a fall with subsequent left shoulder pain and went to ER for evaluation and was found to have a possible uti with sepsis. Urine culture revealed Enterococcus faecalis. During that hospitalization, he pulled out his NG tube, a video swallow was completed, of which he passed. He was sent to the ER this am, 12/22/2020, with a change in mental status and shortness of breath. There was no fever. Pt was dehydrated. Significant labs included:  Wbc 32, Cr 5.3, Na 160, AST 81, , procalcitonin 1.46, and lactic acid 3.2. He was treated with ivf, vanc, ceftriaxone, and cefepime. SARS/ CoV2 +positive . Vitals upon presentation:  T 96.9, R 22, P 120, BP 98/75, 78% on room air, he was placed on heated high flow oxygen 60 L with FiO2 100. He has been nonverbal and non communicating. 12/23/2020: Physical exam not performed d/t +COVID 19 Infection. Palliative care met with wife who wishes no CPR but wants infection treated. 12/24/20:  No in-person visit d/t +COVID 19 isolation.    12/25: pt not examined due to covid, chart  Reviewed  12/26: pt not examined due to covid, chart was reviewed  12/27: pt not seen due to covid, chart reviewed    Past Medical History:   Diagnosis Date    Allergic rhinitis     had allergy shots    Cancer ([facility] [address]     leukemia    Gout     Thyroid disease Past Surgical History:   Procedure Laterality Date    BACK SURGERY      for spinal stenosis [facility]    IR MECHANICAL ART THROMBECTOMY INTRACRANIAL  10/23/2020    IR MECHANICAL ART THROMBECTOMY INTRACRANIAL 10/23/2020 [facility]    TUMOR EXCISION      melanoma on the back       Family History   Problem Relation Age of Onset    Coronary Art Dis Father         aged 63    Breast Cancer Mother     Mult Sclerosis Sister         reports that he has never smoked. He has never used smokeless tobacco. He reports that he does not drink alcohol or use drugs.     Allergies:  Bactrim [sulfamethoxazole-trimethoprim]    Current Medications:        lidocaine 1 % injection 5 mL, Once      sodium chloride flush 0.9 % injection 10 mL, PRN      heparin flush 100 UNIT/ML injection 100 Units, 2 times per day      heparin flush 100 UNIT/ML injection 100 Units, PRN      meropenem (MERREM) 500 mg IVPB extended (mini-bag), Q24H    And      0.9 % sodium chloride infusion (meropenem flush), Q24H      morphine (PF) injection 1 mg, Q2H PRN      dextrose 5 % solution, Continuous      heparin flush 100 UNIT/ML injection 100 Units, PRN      mineral oil-hydrophilic petrolatum (HYDROPHOR) ointment, BID    And      mineral oil-hydrophilic petrolatum (HYDROPHOR) ointment, TID PRN      sodium chloride flush 0.9 % injection 10 mL, 2 times per day      sodium chloride flush 0.9 % injection 10 mL, PRN      heparin (porcine) injection 5,000 Units, 3 times per day      levothyroxine (SYNTHROID) tablet 50 mcg, Daily      aspirin chewable tablet 81 mg, Daily      bisacodyl (DULCOLAX) suppository 10 mg, Daily PRN      magnesium hydroxide (MILK OF MAGNESIA) 400 MG/5ML suspension 30 mL, Daily PRN      traMADol (ULTRAM) tablet 50 mg, Q4H PRN      sertraline (ZOLOFT) tablet 50 mg, Daily      acetaminophen (TYLENOL) tablet 650 mg, Q6H PRN    Or      acetaminophen (TYLENOL) suppository 650 mg, Q6H PRN Component Value Date     12/26/2020    K 3.9 12/26/2020     12/26/2020    CO2 22 12/26/2020     12/26/2020    LABALBU 2.6 12/26/2020    LABALBU 4.8 04/27/2012    CREATININE 2.4 12/26/2020    CALCIUM 9.0 12/26/2020    GFRAA 33 12/26/2020    LABGLOM 27 12/26/2020    GLUCOSE 112 12/26/2020    GLUCOSE 112 04/27/2012     Calcium:    Lab Results   Component Value Date    CALCIUM 9.0 12/26/2020     Ionized Calcium:  No results found for: IONCA  Magnesium:    Lab Results   Component Value Date    MG 1.9 11/30/2020     Phosphorus:    Lab Results   Component Value Date    PHOS 3.9 10/31/2020     LDH:    Lab Results   Component Value Date     12/22/2020     Uric Acid:    Lab Results   Component Value Date    LABURIC 5.4 03/10/2017    URICACID 4.8 07/12/2016     PT/INR:    Lab Results   Component Value Date    PROTIME 14.6 12/22/2020    INR 1.3 12/22/2020     PTT:    Lab Results   Component Value Date    APTT 28.2 12/22/2020   [APTT}  Last 3 Troponin:    Lab Results   Component Value Date    TROPONINI 0.10 12/22/2020    TROPONINI <0.01 11/30/2020     U/A:    Lab Results   Component Value Date    COLORU Yellow 12/22/2020    PROTEINU TRACE 12/22/2020    PHUR 5.0 12/22/2020    WBCUA 1-3 12/22/2020    RBCUA 0-1 12/22/2020    BACTERIA MODERATE 12/22/2020    CLARITYU Clear 12/22/2020    SPECGRAV >=1.030 12/22/2020    LEUKOCYTESUR TRACE 12/22/2020    UROBILINOGEN 1.0 12/22/2020    BILIRUBINUR MODERATE 12/22/2020    BLOODU SMALL 12/22/2020    GLUCOSEU 100 12/22/2020    AMORPHOUS FEW 12/22/2020     ABG:    Lab Results   Component Value Date    PH 7.403 12/22/2020    PCO2 33.5 12/22/2020    PO2 78.4 12/22/2020    HCO3 20.4 12/22/2020    BE -3.3 12/22/2020    O2SAT 95.3 12/22/2020     HgBA1c:    Lab Results   Component Value Date    LABA1C 5.3 10/25/2020     Microalbumen/Creatinine ratio:  No components found for: RUCREAT  FLP:    Lab Results   Component Value Date    TRIG 101 10/25/2020    HDL 48 10/25/2020 [address] 91 10/25/2020    LABVLDL 20 10/25/2020     TSH:    Lab Results   Component Value Date    TSH 0.958 10/29/2020     VITAMIN B12: No components found for: B12  FOLATE:    Lab Results   Component Value Date    FOLATE 19.3 03/16/2015     IRON:  No results found for: IRON  Iron Saturation:  No components found for: PERCENTFE  TIBC:  No results found for: TIBC  FERRITIN:    Lab Results   Component Value Date    FERRITIN 1,489 12/22/2020     RPR:  No results found for: RPR  AMYLASE:  No results found for: AMYLASE  LIPASE:  No results found for: LIPASE  Fibrinogen Level:  No components found for: FIB  Urine Toxicology:  No components found for: IAMMENTA, IBARBIT, IBENZO, ICOCAINE, IMARTHC, IOPIATES, IPHENCYC     Imaging:  EXAMINATION:   CT OF THE HEAD WITHOUT CONTRAST  12/22/2020 3:53 am   TECHNIQUE:   CT of the head was performed without the administration of intravenous   contrast. Dose modulation, iterative reconstruction, and/or weight based   adjustment of the mA/kV was utilized to reduce the radiation dose to as low   as reasonably achievable. COMPARISON:   None. HISTORY:   ORDERING SYSTEM PROVIDED HISTORY: AMS   TECHNOLOGIST PROVIDED HISTORY:   Reason for exam:->AMS   Has a \"code stroke\" or \"stroke alert\" been called? ->No   What reading provider will be dictating this exam?->CRC   FINDINGS:   Stable extensive right-sided encephalomalacia likely with some minimal   dystrophic calcification.  No acute hemorrhage, mass or midline shift. Stable caliber of ventricles and sulci.  Unremarkable bony calvarium.       Impression   No new abnormal findings.  Encephalomalacia on the right as before. EXAMINATION:   CT OF THE CHEST WITHOUT CONTRAST 12/22/2020 3:53 am   TECHNIQUE:   CT of the chest was performed without the administration of intravenous   contrast. Multiplanar reformatted images are provided for review.  Dose   modulation, iterative reconstruction, and/or weight based adjustment of the mA/kV was utilized to reduce the radiation dose to as low as reasonably   achievable. COMPARISON:   October 7, 2009   HISTORY:   ORDERING SYSTEM PROVIDED HISTORY: sob, hypoxia   TECHNOLOGIST PROVIDED HISTORY:   Reason for exam:->sob, hypoxia   What reading provider will be dictating this exam?->CRC   FINDINGS:   There is left lower lobe pneumonia and there is opacity at the right lower   lobe which is most likely atelectasis. Heart size is normal with some coronary artery calcification.  The caliber of   the thoracic aorta is within normal limits.  No mediastinal adenopathy is   present. No active process evident in the upper abdomen.  Chest wall is unremarkable.       Impression   Left lower lobe pneumonia.  Opacity at the right lower lobe is likely   atelectasis. Narrative   EXAMINATION:   CT OF THE ABDOMEN AND PELVIS WITHOUT CONTRAST 12/22/2020 3:53 am   TECHNIQUE:   CT of the abdomen and pelvis was performed without the administration of   intravenous contrast. Multiplanar reformatted images are provided for review. Dose modulation, iterative reconstruction, and/or weight based adjustment of   the mA/kV was utilized to reduce the radiation dose to as low as reasonably   achievable. COMPARISON:   October 7 2009   HISTORY:   ORDERING SYSTEM PROVIDED HISTORY: renal failure, weakness   TECHNOLOGIST PROVIDED HISTORY:   Reason for exam:->renal failure, weakness   Additional Contrast?->None   What reading provider will be dictating this exam?->CRC   FINDINGS:   Lower Chest: Reported separately. Organs: Gallbladder, liver, spleen, pancreas and adrenal glands demonstrate   nothing active. [place] Gent[place] is no evidence of hydronephrosis or renal parenchymal   wasting.  No space-occupying renal lesions or calcified stones are evident. GI/Bowel: Normal appendix. [place] is sigmoid diverticulosis. Pelvis: Nothing active. Peritoneum/Retroperitoneum: Nothing active.    Bones/Soft Tissues: Nothing active.     Impression   No evidence of urinary tract obstruction or other demonstrable specific cause   of renal failure.  See above. EXAMINATION:   ONE XRAY VIEW OF THE CHEST   12/22/2020 3:45 am   COMPARISON:   30 November 2020   HISTORY:   ORDERING SYSTEM PROVIDED HISTORY: ams   TECHNOLOGIST PROVIDED HISTORY:   Reason for exam:->ams   What reading provider will be dictating this exam?->CRC   FINDINGS:   Left lower lobe pneumonia.  Heart and pulmonary vascularity are normal.   Neither costophrenic angle is blunted.       Impression   Left lower lobe pneumonia. Assessment & Plan:   1. AGUSTIN in the setting of dehydration, combined with COVID-19 infection. baseline serum Cr 0.6-0.8 mg/dl with eGFR=>60 ml/min. Serum Cr OA   4.9-->5.3-->5.5 ->6.0-->4.9>3.5>2.4  CT scan reveals no hydro, no calculi. FENa - 0.4% supports pre-renal etiology  PVR, MCR - 39.8  Pts wife conveys that pt does not want dialysis  Continue IVF D5W    2. Hypernatremia in the setting of dehydration. Na+160 OA-->156->155->153>150  Continue ivf  Monitor Q 12 hrs  Doubt pt getting the fluids that are ordered    3. DM type II  Controlled as evidenced by HgbA1c 5.3 on 10/25/2020  Primary following    4. Leukocytosis  Wbc 32.2>23>21  No fever  LLL infiltrates  Received vanc, cefepime, ceftriaxone in ER  On Vanc  ID following. 5. Altered Mental Status  Primary following     6. COVID 19 Infection  Received steroids  ID following.        Thank you Dr. Jose M Walker MD for allowing us to participate in care of Columba Spencer SHANIA Garsia-CALOS  3:15 PM  12/27/2020

## 2020-12-27 NOTE — PLAN OF CARE
Problem: Falls - Risk of:  Goal: Will remain free from falls  Description: Will remain free from falls  Outcome: Met This Shift     Problem: Airway Clearance - Ineffective  Goal: Achieve or maintain patent airway  Outcome: Met This Shift     Problem: Gas Exchange - Impaired  Goal: Absence of hypoxia  Outcome: Met This Shift  Goal: Promote optimal lung function  Outcome: Met This Shift     Problem: Injury - Risk of, Physical Injury:  Goal: Will remain free from falls  Description: Will remain free from falls  Outcome: Met This Shift

## 2020-12-28 NOTE — PLAN OF CARE
Problem: Falls - Risk of:  Goal: Will remain free from falls  Description: Will remain free from falls  Outcome: Met This Shift  Goal: Absence of physical injury  Description: Absence of physical injury  Outcome: Met This Shift     Problem: Gas Exchange - Impaired  Goal: Absence of hypoxia  Outcome: Met This Shift     Problem: Skin Integrity:  Goal: Will show no infection signs and symptoms  Description: Will show no infection signs and symptoms  Outcome: Met This Shift     Problem: Injury - Risk of, Physical Injury:  Goal: Will remain free from falls  Description: Will remain free from falls  Outcome: Met This Shift  Goal: Absence of physical injury  Description: Absence of physical injury  Outcome: Met This Shift     Problem: Pain:  Goal: Pain level will decrease  Description: Pain level will decrease  Outcome: Met This Shift  Goal: Control of acute pain  Description: Control of acute pain  Outcome: Met This Shift  Goal: Control of chronic pain  Description: Control of chronic pain  Outcome: Met This Shift

## 2020-12-28 NOTE — PROGRESS NOTES
[place]  Department of Internal Medicine  Division of Pulmonary, Critical Care and Sleep Medicine  Progress Note    Lanny Mejia DO, [facility], [place], [address] [place] [address] MD [place], [facility]    Patient: Bib Murray  MRN: [id number]  : 1955    Encounter Time: 6:33 PM     Date of Admission: 2020  3:15 AM    Primary Care Physician: Noah Mooney MD    Reason for Consultation: [place] and Sepsis from [address]:  Home with hospice is what I was told    OBJECTIVE:     PHYSICAL EXAM:   VITALS:     Intake/Output Summary (Last 24 hours) at 2020 1833  Last data filed at 2020 1341  Gross per 24 hour   Intake 0 ml   Output 1175 ml   Net -1175 ml      Vitals:    20 0907 20 1656 20 0730 20 1715   BP: 112/63 110/71 102/66 102/79   Pulse: 98 102 114 112   Resp: 26 18 13 15   Temp: 98.2 °F (36.8 °C) 97.3 °F (36.3 °C) 97.1 °F (36.2 °C) 97.7 °F (36.5 °C)   TempSrc: Temporal Temporal Temporal Temporal   SpO2: 93% 97% 94% 94%   Weight:       Height:           CONSTITUTIONAL:   Alert, NAD  SKIN:     Pale skin discoloration  HEENT:     No thrush  NECK:    No bruits, No JVP apprechiated  CV:      Sinus,  No murmur, No rubs, No gallops  PULMONARY:   Couse BS,  No Rhonchi      No noted egophony  ABDOMEN:     Soft, non-tender. BS normal. No R/R/G  EXT:    No deformities . No clubbing.       + lower extremity edema, No venous stasis  PULSE:   Appears equal and palpable.   PSYCHIATRIC:  aphasia  MS:    Gross weakness  NEUROLOGIC:   Focal     DATA: IMAGING & TESTING:     LABORATORY TESTS:    CBC:   Lab Results   Component Value Date    WBC 23.9 2020    RBC 4.87 2020    HGB 14.3 2020    HCT 46.3 2020    MCV 95.1 2020    MCH 29.4 2020    MCHC 30.9 2020    RDW 14.1 2020     2020    MPV 12.1 2020     CMP:    Lab Results   Component Value Date     2020    K 3.9 2020  12/28/2020    CO2 25 12/28/2020    BUN 80 12/28/2020    CREATININE 1.3 12/28/2020    GFRAA >60 12/28/2020    LABGLOM 56 12/28/2020    GLUCOSE 110 12/28/2020    GLUCOSE 112 04/27/2012    PROT 6.2 12/28/2020    LABALBU 2.9 12/28/2020    LABALBU 4.8 04/27/2012    CALCIUM 9.7 12/28/2020    BILITOT 1.0 12/28/2020    ALKPHOS 340 12/28/2020    AST 67 12/28/2020     12/28/2020     TSH:    Lab Results   Component Value Date    TSH 0.958 10/29/2020     FERRITIN:    Lab Results   Component Value Date    FERRITIN 1,489 12/22/2020     Fibrinogen Level:  No components found for: FIB     PRO-BNP:   Lab Results   Component Value Date    PROBNP 1,385 (H) 12/22/2020    PROBNP 120 11/30/2020      ABGs:   Lab Results   Component Value Date    PH 7.403 12/22/2020    PO2 78.4 12/22/2020    PCO2 33.5 12/22/2020     Hemoglobin A1C: No components found for: HGBA1C    IMAGING:  Imaging tests were completed and reviewed and discussed radiology and care team involved and reveals     Ct Head Wo Contrast    Result Date: 12/22/2020  EXAMINATION: CT OF THE HEAD WITHOUT CONTRAST  12/22/2020 3:53 am TECHNIQUE: CT of the head was performed without the administration of intravenous contrast. Dose modulation, iterative reconstruction, and/or weight based adjustment of the mA/kV was utilized to reduce the radiation dose to as low as reasonably achievable. COMPARISON: None. HISTORY: ORDERING SYSTEM PROVIDED HISTORY: AMS TECHNOLOGIST PROVIDED HISTORY: Reason for exam:->AMS Has a \"code stroke\" or \"stroke alert\" been called? ->No What reading provider will be dictating this exam?->CRC FINDINGS: Stable extensive right-sided encephalomalacia likely with some minimal dystrophic calcification. No acute hemorrhage, mass or midline shift. Stable caliber of ventricles and sulci. Unremarkable bony calvarium. No new abnormal findings. Encephalomalacia on the right as before.     Ct Head Wo Contrast    Result Date: 11/30/2020 EXAMINATION: CT OF THE HEAD WITHOUT CONTRAST  11/30/2020 12:39 pm TECHNIQUE: CT of the head was performed without the administration of intravenous contrast. Dose modulation, iterative reconstruction, and/or weight based adjustment of the mA/kV was utilized to reduce the radiation dose to as low as reasonably achievable. COMPARISON: 10/28/2020 HISTORY: ORDERING SYSTEM PROVIDED HISTORY: fall at [facility] TECHNOLOGIST PROVIDED HISTORY: Has a \"code stroke\" or \"stroke alert\" been called? ->No Reason for exam:->fall at [facility] What reading provider will be dictating this exam?->CRC FINDINGS: BRAIN/VENTRICLES: No acute intracranial hemorrhage or cortical based infarct. Extensive encephalomalacia within the right MCA distribution secondary to remote infarct. No mass effect or midline shift. No abnormal extra-axial fluid collections. The ventricles are intact without features of hydrocephalus. ORBITS: The visualized portion of the orbits demonstrate no acute abnormality. SINUSES: The visualized paranasal sinuses and mastoid air cells demonstrate no acute abnormality. SOFT TISSUES/SKULL:  No acute abnormality of the visualized skull or soft tissues. No acute intracranial abnormality.     Ct Chest Wo Contrast    Result Date: 12/22/2020 EXAMINATION: CT OF THE CHEST WITHOUT CONTRAST 12/22/2020 3:53 am TECHNIQUE: CT of the chest was performed without the administration of intravenous contrast. Multiplanar reformatted images are provided for review. Dose modulation, iterative reconstruction, and/or weight based adjustment of the mA/kV was utilized to reduce the radiation dose to as low as reasonably achievable. COMPARISON: October 7, 2009 HISTORY: ORDERING SYSTEM PROVIDED HISTORY: sob, hypoxia TECHNOLOGIST PROVIDED HISTORY: Reason for exam:->sob, hypoxia What reading provider will be dictating this exam?->CRC FINDINGS: There is left lower lobe pneumonia and there is opacity at the right lower lobe which is most likely atelectasis. Heart size is normal with some coronary artery calcification. The caliber of the thoracic aorta is within normal limits. No mediastinal adenopathy is present. No active process evident in the upper abdomen. Chest wall is unremarkable. Left lower lobe pneumonia. Opacity at the right lower lobe is likely atelectasis. Fl Modified Barium Swallow W Video  Result Date: 12/2/2020  Swallowing dysfunction as described above including deep penetration of thin liquid barium and silent aspiration of nectar. Please see separate speech pathology report for full discussion of findings and recommendations. Assessment:   1. COVID-19 infection,   2. Sepsis,   3. Aphasia, CVA chronic  4. Encephalomalacia  5. HCAP, Left lower lobe pneumonia. 6. hyponatremia,   7. acute kidney injury,   8. Lactic acidosis        Plan:   1.  Sign off    Andrés Machado, MPH, [facility]  Professor of Medicine  Pulmonary, Critical Care and Sleep Medicine

## 2020-12-28 NOTE — PROGRESS NOTES
Nephrology Consult Note  Patient's Name: Fer Amin  12:45 PM  12/28/2020    Nephrologist: Dr. Joe Verdin    Reason for Consult:  AGUSTIN & Hypernatremia  Requesting Physician:  Sigrid Morelos MD    Chief Complaint:  AMS and SOB    History Obtained From:  past medical records    History of Present Ilness:  Per 12/22/20 Note:  Fer Amin is a 60 y.o. 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 He had a recent hospitalization 11/30-12/3/2020 for sepsis and severe malnutrition. He had recently had a stroke and was at a rehab, where he suffered a fall with subsequent left shoulder pain and went to ER for evaluation and was found to have a possible uti with sepsis. Urine culture revealed Enterococcus faecalis. During that hospitalization, he pulled out his NG tube, a video swallow was completed, of which he passed. He was sent to the ER this am, 12/22/2020, with a change in mental status and shortness of breath. There was no fever. Pt was dehydrated. Significant labs included:  Wbc 32, Cr 5.3, Na 160, AST 81, , procalcitonin 1.46, and lactic acid 3.2. He was treated with ivf, vanc, ceftriaxone, and cefepime. SARS/ CoV2 +positive . Vitals upon presentation:  T 96.9, R 22, P 120, BP 98/75, 78% on room air, he was placed on heated high flow oxygen 60 L with FiO2 100. He has been nonverbal and non communicating. 12/28: pt not examined due to covid, chart reviewed. Patient discussed with RN. Family has not decided on possible Hospice care.        Past Medical History:   Diagnosis Date    Allergic rhinitis     had allergy shots    Cancer ([address]     leukemia    Gout     Thyroid disease        Past Surgical History:   Procedure Laterality Date    BACK SURGERY      [facility]    IR MECHANICAL ART THROMBECTOMY INTRACRANIAL  10/23/2020 IR MECHANICAL ART THROMBECTOMY INTRACRANIAL 10/23/2020 [facility]    TUMOR EXCISION      melanoma on the back       Family History   Problem Relation Age of Onset    Coronary Art Dis Father         aged 63    Breast Cancer Mother     Mult Sclerosis Sister         reports that he has never smoked. He has never used smokeless tobacco. He reports that he does not drink alcohol or use drugs. Allergies:  Bactrim [sulfamethoxazole-trimethoprim]    Current Medications:        lidocaine 1 % injection 5 mL, Once      sodium chloride flush 0.9 % injection 10 mL, PRN      heparin flush 100 UNIT/ML injection 100 Units, 2 times per day      heparin flush 100 UNIT/ML injection 100 Units, PRN      dronabinol (MARINOL) capsule 5 mg, BID      meropenem (MERREM) 500 mg IVPB extended (mini-bag), Q24H    And      0.9 % sodium chloride infusion (meropenem flush), Q24H      morphine (PF) injection 1 mg, Q2H PRN      dextrose 5 % solution, Continuous      heparin flush 100 UNIT/ML injection 100 Units, PRN      mineral oil-hydrophilic petrolatum (HYDROPHOR) ointment, BID    And      mineral oil-hydrophilic petrolatum (HYDROPHOR) ointment, TID PRN      sodium chloride flush 0.9 % injection 10 mL, 2 times per day      sodium chloride flush 0.9 % injection 10 mL, PRN      heparin (porcine) injection 5,000 Units, 3 times per day      levothyroxine (SYNTHROID) tablet 50 mcg, Daily      aspirin chewable tablet 81 mg, Daily      bisacodyl (DULCOLAX) suppository 10 mg, Daily PRN      magnesium hydroxide (MILK OF MAGNESIA) 400 MG/5ML suspension 30 mL, Daily PRN      traMADol (ULTRAM) tablet 50 mg, Q4H PRN      sertraline (ZOLOFT) tablet 50 mg, Daily      acetaminophen (TYLENOL) tablet 650 mg, Q6H PRN    Or      acetaminophen (TYLENOL) suppository 650 mg, Q6H PRN      Vitamin D (CHOLECALCIFEROL) tablet 2,000 Units, Daily        Review of Systems:   Pertinent items are noted in HPI.     Physical exam: Constitutional:    Vitals: VITALS:  /66   Pulse 114   Temp 97.1 °F (36.2 °C) (Temporal)   Resp 13   Ht 6' 2\" (1.88 m)   Wt 185 lb (83.9 kg)   SpO2 94%   BMI 23.75 kg/m²   24HR INTAKE/OUTPUT:      Intake/Output Summary (Last 24 hours) at 12/28/2020 1245  Last data filed at 12/28/2020 1110  Gross per 24 hour   Intake 0 ml   Output 1475 ml   Net -1475 ml     URINARY CATHETER OUTPUT ([place]):  Urethral Catheter-Output (mL): 450 mL     PE not performed-pt is COVID-19 +positive.     Data:   Labs:  CBC:   Lab Results   Component Value Date    WBC 23.9 12/28/2020    RBC 4.87 12/28/2020    HGB 14.3 12/28/2020    HCT 46.3 12/28/2020    MCV 95.1 12/28/2020    MCH 29.4 12/28/2020    MCHC 30.9 12/28/2020    RDW 14.1 12/28/2020     12/28/2020    MPV 12.1 12/28/2020     CBC with Differential:    Lab Results   Component Value Date    WBC 23.9 12/28/2020    RBC 4.87 12/28/2020    HGB 14.3 12/28/2020    HCT 46.3 12/28/2020     12/28/2020    MCV 95.1 12/28/2020    MCH 29.4 12/28/2020    MCHC 30.9 12/28/2020    RDW 14.1 12/28/2020    SEGSPCT 21 04/27/2012    METASPCT 0.9 12/24/2020    LYMPHOPCT 3.6 12/28/2020    MONOPCT 6.0 12/28/2020    BASOPCT 0.3 12/28/2020    MONOSABS 1.44 12/28/2020    LYMPHSABS 0.85 12/28/2020    EOSABS 0.11 12/28/2020    BASOSABS 0.08 12/28/2020     CMP:    Lab Results   Component Value Date     12/28/2020    K 3.9 12/28/2020     12/28/2020    CO2 25 12/28/2020    BUN 80 12/28/2020    CREATININE 1.3 12/28/2020    GFRAA >60 12/28/2020    LABGLOM 56 12/28/2020    GLUCOSE 110 12/28/2020    GLUCOSE 112 04/27/2012    PROT 6.2 12/28/2020    LABALBU 2.9 12/28/2020    LABALBU 4.8 04/27/2012    CALCIUM 9.7 12/28/2020    BILITOT 1.0 12/28/2020    ALKPHOS 340 12/28/2020    AST 67 12/28/2020     12/28/2020     BMP:    Lab Results   Component Value Date     12/28/2020    K 3.9 12/28/2020     12/28/2020    CO2 25 12/28/2020    BUN 80 12/28/2020 LABALBU 2.9 12/28/2020    LABALBU 4.8 04/27/2012    CREATININE 1.3 12/28/2020    CALCIUM 9.7 12/28/2020    GFRAA >60 12/28/2020    LABGLOM 56 12/28/2020    GLUCOSE 110 12/28/2020    GLUCOSE 112 04/27/2012     Calcium:    Lab Results   Component Value Date    CALCIUM 9.7 12/28/2020     Ionized Calcium:  No results found for: IONCA  Magnesium:    Lab Results   Component Value Date    MG 1.9 11/30/2020     Phosphorus:    Lab Results   Component Value Date    PHOS 3.9 10/31/2020     LDH:    Lab Results   Component Value Date     12/22/2020     Uric Acid:    Lab Results   Component Value Date    LABURIC 5.4 03/10/2017    URICACID 4.8 07/12/2016     PT/INR:    Lab Results   Component Value Date    PROTIME 14.6 12/22/2020    INR 1.3 12/22/2020     PTT:    Lab Results   Component Value Date    APTT 28.2 12/22/2020   [APTT}  Last 3 Troponin:    Lab Results   Component Value Date    TROPONINI 0.10 12/22/2020    TROPONINI <0.01 11/30/2020     U/A:    Lab Results   Component Value Date    COLORU Yellow 12/22/2020    PROTEINU TRACE 12/22/2020    PHUR 5.0 12/22/2020    WBCUA 1-3 12/22/2020    RBCUA 0-1 12/22/2020    BACTERIA MODERATE 12/22/2020    CLARITYU Clear 12/22/2020    SPECGRAV >=1.030 12/22/2020    LEUKOCYTESUR TRACE 12/22/2020    UROBILINOGEN 1.0 12/22/2020    BILIRUBINUR MODERATE 12/22/2020    BLOODU SMALL 12/22/2020    GLUCOSEU 100 12/22/2020    AMORPHOUS FEW 12/22/2020     ABG:    Lab Results   Component Value Date    PH 7.403 12/22/2020    PCO2 33.5 12/22/2020    PO2 78.4 12/22/2020    HCO3 20.4 12/22/2020    BE -3.3 12/22/2020    O2SAT 95.3 12/22/2020     HgBA1c:    Lab Results   Component Value Date    LABA1C 5.3 10/25/2020     Microalbumen/Creatinine ratio:  No components found for: RUCREAT  FLP:    Lab Results   Component Value Date    TRIG 101 10/25/2020    HDL 48 10/25/2020    LDLCALC 91 10/25/2020    LABVLDL 20 10/25/2020     TSH:    Lab Results   Component Value Date    TSH 0.958 10/29/2020

## 2020-12-28 NOTE — PROGRESS NOTES
  dronabinol (MARINOL) capsule 5 mg, 5 mg, Oral, BID, Alli Soriano MD, Stopped at 12/27/20 2024    meropenem (MERREM) 500 mg IVPB extended (mini-bag), 500 mg, Intravenous, Q24H, Last Rate: 33.3 mL/hr at 12/28/20 1310, 500 mg at 12/28/20 1310 **AND** 0.9 % sodium chloride infusion (meropenem flush), , Intravenous, Q24H, Cristobal Prince MD, Stopped at 12/26/20 2039    morphine (PF) injection 1 mg, 1 mg, Intravenous, Q2H PRN, Kristin Burkitt, DO, 1 mg at 12/28/20 1326    dextrose 5 % solution, , Intravenous, Continuous, Carmen Nail, DO, Last Rate: 125 mL/hr at 12/28/20 1310, New Bag at 12/28/20 1310    heparin flush 100 UNIT/ML injection 100 Units, 100 Units, Intracatheter, PRN, Alli Soriano MD, 100 Units at 12/26/20 2037    mineral oil-hydrophilic petrolatum (HYDROPHOR) ointment, , Topical, BID, Given at 12/28/20 1031 **AND** mineral oil-hydrophilic petrolatum (HYDROPHOR) ointment, , Topical, TID PRN, Alli Soriano MD    sodium chloride flush 0.9 % injection 10 mL, 10 mL, Intravenous, 2 times per day, Alli Soriano MD, 10 mL at 12/28/20 1032    sodium chloride flush 0.9 % injection 10 mL, 10 mL, Intravenous, PRN, Alli Soriano MD    heparin (porcine) injection 5,000 Units, 5,000 Units, Subcutaneous, 3 times per day, Alli Soriano MD, 5,000 Units at 12/28/20 1304    levothyroxine (SYNTHROID) tablet 50 mcg, 50 mcg, Oral, Daily, Alli Soriano MD, Stopped at 12/28/20 0505    aspirin chewable tablet 81 mg, 81 mg, Oral, Daily, Alli Soriano MD, 81 mg at 12/26/20 1038    bisacodyl (DULCOLAX) suppository 10 mg, 10 mg, Rectal, Daily PRN, Alli Soriano MD    magnesium hydroxide (MILK OF MAGNESIA) 400 MG/5ML suspension 30 mL, 30 mL, Oral, Daily PRN, Alli Soriano MD    traMADol (ULTRAM) tablet 50 mg, 50 mg, Oral, Q4H PRN, Alli Soriano MD

## 2020-12-28 NOTE — PROGRESS NOTES
Department of Internal Medicine  Infectious Diseases  Progress Note      C/C :   COVID 19 , leukocytosis ,pneumonia     Discussed with pt's wife    Pt is more awake and alert   Afebrile   No resp distress       Current Facility-Administered Medications   Medication Dose Route Frequency Provider Last Rate Last Admin    lidocaine 1 % injection 5 mL  5 mL Intradermal Once David Drake MD        sodium chloride flush 0.9 % injection 10 mL  10 mL Intravenous PRN David Drake MD        heparin flush 100 UNIT/ML injection 100 Units  1 mL Intravenous 2 times per day David Drake MD   100 Units at 12/28/20 1107    heparin flush 100 UNIT/ML injection 100 Units  1 mL Intracatheter PRN David Drake MD        dronabinol (MARINOL) capsule 5 mg  5 mg Oral BID David Drake MD   Stopped at 12/27/20 2024    meropenem (MERREM) 500 mg IVPB extended (mini-bag)  500 mg Intravenous Q24H Cristobal Prince MD 33.3 mL/hr at 12/28/20 1310 500 mg at 12/28/20 1310    And    0.9 % sodium chloride infusion (meropenem flush)   Intravenous Q24H Jobie Spatz, MD   Stopped at 12/26/20 2039    morphine (PF) injection 1 mg  1 mg Intravenous Q2H PRN Abel Reveal, DO   1 mg at 12/28/20 1326    dextrose 5 % solution   Intravenous Continuous Rosalie Sacks Kenji,  mL/hr at 12/28/20 1310 New Bag at 12/28/20 1310    heparin flush 100 UNIT/ML injection 100 Units  100 Units Intracatheter PRN David Drake MD   100 Units at 12/26/20 2037    mineral oil-hydrophilic petrolatum (HYDROPHOR) ointment   Topical BID David Drake MD   Given at 12/28/20 1031    And    mineral oil-hydrophilic petrolatum (HYDROPHOR) ointment   Topical TID PRN David Drake MD        sodium chloride flush 0.9 % injection 10 mL  10 mL Intravenous 2 times per day David Drake MD   10 mL at 12/28/20 1032  sodium chloride flush 0.9 % injection 10 mL  10 mL Intravenous PRN Sigrid Morelos MD        heparin (porcine) injection 5,000 Units  5,000 Units Subcutaneous 3 times per day Sigrid Morelos MD   5,000 Units at 12/28/20 1304    levothyroxine (SYNTHROID) tablet 50 mcg  50 mcg Oral Daily Sigrid Morelos MD   Stopped at 12/28/20 0505    aspirin chewable tablet 81 mg  81 mg Oral Daily Sigrid Morelos MD   81 mg at 12/26/20 1038    bisacodyl (DULCOLAX) suppository 10 mg  10 mg Rectal Daily PRN Sigrid Morelos MD        magnesium hydroxide (MILK OF MAGNESIA) 400 MG/5ML suspension 30 mL  30 mL Oral Daily PRN Sigrid Morelos MD        traMADol [place]) tablet 50 mg  50 mg Oral Q4H PRN Sigrid Morelos MD        sertraline (ZOLOFT) tablet 50 mg  50 mg Oral Daily Sigrid Morelos MD   50 mg at 12/26/20 1038    acetaminophen (TYLENOL) tablet 650 mg  650 mg Oral Q6H PRN Sigrid Morelos MD        Or   Dossie Sandie acetaminophen (TYLENOL) suppository 650 mg  650 mg Rectal Q6H PRN Sigrid Morelos MD        Vitamin D (CHOLECALCIFEROL) tablet 2,000 Units  2,000 Units Oral Daily Sigrid Morelos MD   2,000 Units at 12/26/20 1038     REVIEW OF SYSTEMS: could not be obtained       PHYSICAL EXAM:      Vitals:     /66   Pulse 114   Temp 97.1 °F (36.2 °C) (Temporal)   Resp 13   Ht 6' 2\" (1.88 m)   Wt 185 lb (83.9 kg)   SpO2 94%   BMI 23.75 kg/m²     General Appearance:     More awake       Head:    Normocephalic, atraumatic   Eyes:    +  pallor, no icterus,   Ears:    No obvious deformity or drainage.    Nose:   No nasal drainage   Throat:   Mucosa dry    Neck:   Supple, no lymphadenopathy   Lungs:     Decreased breath sound,    Heart:    Tachycardic    Abdomen:     Soft,not distended, bowel sounds present    Extremities:   No edema,    Pulses:   Dorsalis pedis palpable    Skin:   no rashes or lesions   CBC with Differential:      Lab Results Component Value Date    WBC 23.9 12/28/2020    RBC 4.87 12/28/2020    HGB 14.3 12/28/2020    HCT 46.3 12/28/2020     12/28/2020    MCV 95.1 12/28/2020    MCH 29.4 12/28/2020    MCHC 30.9 12/28/2020    RDW 14.1 12/28/2020    SEGSPCT 21 04/27/2012    METASPCT 0.9 12/24/2020    LYMPHOPCT 3.6 12/28/2020    MONOPCT 6.0 12/28/2020    BASOPCT 0.3 12/28/2020    MONOSABS 1.44 12/28/2020    LYMPHSABS 0.85 12/28/2020    EOSABS 0.11 12/28/2020    BASOSABS 0.08 12/28/2020       CMP     Lab Results   Component Value Date     12/28/2020    K 3.9 12/28/2020     12/28/2020    CO2 25 12/28/2020    BUN 80 12/28/2020    CREATININE 1.3 12/28/2020    GFRAA >60 12/28/2020    LABGLOM 56 12/28/2020    GLUCOSE 110 12/28/2020    GLUCOSE 112 04/27/2012    PROT 6.2 12/28/2020    LABALBU 2.9 12/28/2020    LABALBU 4.8 04/27/2012    CALCIUM 9.7 12/28/2020    BILITOT 1.0 12/28/2020    ALKPHOS 340 12/28/2020    AST 67 12/28/2020     12/28/2020       Hepatic Function Panel:      Lab Results   Component Value Date    ALKPHOS 340 12/28/2020     12/28/2020    AST 67 12/28/2020    PROT 6.2 12/28/2020    BILITOT 1.0 12/28/2020    LABALBU 2.9 12/28/2020    LABALBU 4.8 04/27/2012       PT/INR:    Lab Results   Component Value Date    PROTIME 14.6 12/22/2020    INR 1.3 12/22/2020       TSH:    Lab Results   Component Value Date    TSH 0.958 10/29/2020       U/A:      Lab Results   Component Value Date    COLORU Yellow 12/22/2020    PHUR 5.0 12/22/2020    WBCUA 1-3 12/22/2020    RBCUA 0-1 12/22/2020    BACTERIA MODERATE 12/22/2020    CLARITYU Clear 12/22/2020    SPECGRAV >=1.030 12/22/2020    LEUKOCYTESUR TRACE 12/22/2020    UROBILINOGEN 1.0 12/22/2020    BILIRUBINUR MODERATE 12/22/2020    BLOODU SMALL 12/22/2020    GLUCOSEU 100 12/22/2020    AMORPHOUS FEW 12/22/2020       ABG:  No results found for: RHY3HYP, BEART, S2JHFHUX, PHART, THGBART, KMB0LHV, PO2ART, OFN3AYY    MICROBIOLOGY:    Blood culture - pending Urine Culture -     SARS-CoV-2, PCR DETECTEDAbnormal        Radiology :      CT scan of chest -  Left lower lobe pneumonia.  Opacity at the right lower lobe is likely   Atelectasis. IMPRESSION:     1. Respiratory failure ,on  13 L of high flow oxygen , saturation 96 %   2. COVID 19 pneumonia ( Severe ) / Pneumonia / Sepsis   3. Leukocytosis, lactic acidosis, elevated procalcitonin   4. Transaminitis       RECOMMENDATIONS:      1.  Stop meropenem , stop decadron   Pt is being discharged to home with hospice   ID sign off

## 2020-12-28 NOTE — PROGRESS NOTES
[facility]          Liaison Information Visit Note              Patient Name: Leeroy Almonte   :  1955  MRN:  [id number]    Admit date:  2020    Admitted from: rehab  Hospital Admitting Physician:  David Drake MD   PCP:  David Drake MD      Primary Insurance: Payor: [facility] /  /  /    Secondary Insurance:  unknown    Emergency Contact:      Contact/Relation:   /         Phone:       Contact/Relation:   /     Phone:     Advance Directive  Advance directives received No  Patient has NOT completed an advance directive   Discussed with: Family member  DPOA-HC Name-Relation:    Phone:       Terminal Diagnosis COVID pneumonia respiratory failure with hypoxia as confirmed by Dr. Aram Tena Problem List:   Patient Active Problem List   Diagnosis Code    Chronic lymphocytic leukemia ([facility] [address] C91.10    Acquired hypothyroidism E03.9    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.0720    Recurrent genital herpes A60.00    Diabetes mellitus type 2 in nonobese ([facility] [address] E11.9    Prediabetes R73.03    Stroke, acute, thrombotic ([facility] [address] I63.9    HLD (hyperlipidemia) E78.5    Cerebral edema ([facility] [address] G93.6    COVID-19 U07.1    Altered mental status R41.82    Palliative care by specialist Z51.5    Goals of care, counseling/discussion Z71.89       Code Status Order: Limited     Past Medical History:        Diagnosis Date    Allergic rhinitis     had allergy shots    Cancer ([facility] [address]     leukemia    Gout     Thyroid disease      Past Surgical History:        Procedure Laterality Date    BACK SURGERY      [facility]    IR MECHANICAL ART THROMBECTOMY INTRACRANIAL  10/23/2020    IR MECHANICAL ART THROMBECTOMY INTRACRANIAL 10/23/2020 [facility]    TUMOR EXCISION      melanoma on the back       Allergies:  Bactrim [sulfamethoxazole-trimethoprim]    Family Goal: undecided    Meeting held with wife Serge Chambers The hospice benefit and philosophy were explained including that hospice is end of life care in which, per Medicare, a patient has a terminal diagnosis that life expectancy would be 6 months or less. Hospice care is a service that is covered by most insurance plans. The following levels of hospice care were discussed including, routine level of hospice care at private home or facility, which patient/family is responsible for any room and board fees at the facility, and general in patient level of care (GIP) at the [facility] for short term symptom management. Per Medicare guidelines, a patient is considered appropriate for GIP if they have uncontrolled symptoms such as pain, agitation, labored breathing or nausea/vomiting. Once symptoms become managed, the patient would need to be moved to a lower level of care such as home with hospice, ECF with hospice or the Transition program.  [facility] transition program also explained which is routine hospice care provided at the [facility] instead of an ECF or home. The transition program is private pay $300/day for room/board. Room/board for the transition program is not covered by Medicaid as would be in an ECF. Family informed that with the routine level of care at home or ECF,  the hospice team consists of the RN who visits 1-3 times a week, a  who visits within the first five days of the hospice election, the personal care team who visit 1-3 times a week, non-medical volunteers and Chaplains. Explained that at home in routine level of care, familles are responsible for the 24 hour care. Discussed that under hospice care patient would not receive chemotherapy, radiation, immune therapy, IV antibiotics, dialysis or blood transfusions. Explained that once in hospice care, all aggressive treatments would be stopped and allow nature to takes its course with focus on comfort care for the patient. Karla Ferreira would like to discuss with her family before she makes any decisions regarding transitioning to hospice care. She states patient is no longer eating or drinking and does not want a feeding tube. Karla Ferreira states patient says he just wants to go home. I explained that if home is the destination, I could get him there today. Karla Ferreira will speak with her family first.  I offered to follow up tomorrow and she was in agreement. Updated SW/CM. Discharge Plan:  Discharge Disposition; unknown  Facility Name: none    [facility] plan:  1. Will follow up with Karla Ferreira tomorrow. 2. Please call [facility] [phone] with any questions. 3. Patient not currently under the care of hospice.     Electronically signed by Myriam Andersen RN on 12/28/2020 at 3:34 PM

## 2020-12-28 NOTE — PROGRESS NOTES
(CHG) POWER MIDLINE Placement 12/28/2020    Product number: [id number]   Lot Number: [id number]      Ultrasound: YES   Right Basilic vein:                Upper Arm Circumference: 24    Size: 4.5    Exposed Length: 0    Internal Length: 15   Cut: 0   Vein Measurement: 0.55    Kristen Whiting  12/28/2020  9:29 AM

## 2020-12-28 NOTE — PROGRESS NOTES
Palliative Care Department  [phone]  Palliative Care Progress Note  Joselo Hurtado  [id number]  Hospital Day: 7    Date of Initial Consult: 12/23/20  Referring Provider:  Dr. Eufemia West was consulted for assistance with:goals of care and code status discussion      HPI:   Trina Cade is a 60 y.o. with a past medical history of gout, leukemia, HLD,  thyroid disease, intracranial thrombectomy (CVA-10/23/20) who was admitted on 12/22/2020 from local nursing facility with a CHIEF COMPLAINT of altered mental status for ~ 2 days and shortness of breath. EMS found pt to be hypoxic; pt was placed on non rebreather mask. Workup in ED noting left LL pneumonia; leukocytosis of 32.3; hyponatremia; AGUSTIN; UTI and +for COVID 19. CT of head noted no new findings. He was started on IV fluids,  antibiotics and decadron. He was placed on Optiflow. He was admitted for further care. ASSESSMENT/PLAN:     Pertinent Hospital Diagnoses   ? COVID 19 pneumonia/resp failure with hypoxia-continue oxygen supplementation as needed; dexamethasone; ID consulted; continues on antibiotics  ? Septicemia continue antibiotics  ? AMS- continue to evaluate neuro sats  ? AGUSTIN- Nephrology consulted; continue fluids  ? Hypernatremia- continue fluids; monitor Na+    Palliative Care Encounter / Counseling Regarding Goals of Care    ? Trina Cade, Does Not have capacity for medical decision-making. Capacity is time limited and situation/question specific  ? During encounter wife Frank Dykes was surrogate medical decision-maker  ? Outcome of goals of care meeting: patient wants to go home, wife requesting information from hospice   ? Code status ;  limited code- no to everything  ? Advanced Directives: no HPOA or Living Will noted in chart  ?  Surrogate/Legal NOK:  o spouse Tracy Marks @ [phone]    Spiritual assessment: no spiritual distress identified  Bereavement and grief: to be determined Referrals to: none today    SUBJECTIVE:     Active Hospital Problems    Diagnosis Date Noted    Altered mental status [R41.82]     Palliative care by specialist [Z51.5]     Goals of care, counseling/discussion [Z71.89]     COVID-19 [U07.1] 12/22/2020     Details of Conversation:    Per bedside RN no improvement in condition. Spoke with wife who was at bedside. Patient has been asking to go home, not improving, unable to take oral medications at this time. Wife wants to get information from hospice about what they could provide at home, she is leaning towards just taking the patient home with hospice due to lack of improvement. Wife requesting HOTV. OBJECTIVE:   Prognosis: Guarded    Physical Exam:  /66   Pulse 114   Temp 97.1 °F (36.2 °C) (Temporal)   Resp 13   Ht 6' 2\" (1.88 m)   Wt 185 lb (83.9 kg)   SpO2 94%   BMI 23.75 kg/m²   Physical exam not performed due to COVID-19 pandemic and need to preserve PPE. Please see note from primary team for exam details. Objective data reviewed: labs, images, records, medication use, vitals and chart    Discussed patient and the plan of care with the other IDT members: Palliative Medicine IDT Team    Time/Communication  Greater than 50% of time spent, total 25 minutes in counseling and coordination of care at the bedside regarding goals of care, symptom management, diagnosis and prognosis and see above. Thank you for allowing Palliative Medicine to participate in the care of Ely Baldwin.

## 2020-12-29 NOTE — PROGRESS NOTES
CLINICAL PHARMACY NOTE: MEDS TO [address] Select Patient?: No  Total # of Prescriptions Filled: 1   The following medications were delivered to the patient:  · Morphine sulfate 100 solution  Total # of Interventions Completed: 3  Time Spent (min): 15    Additional Documentation:

## 2020-12-29 NOTE — ADT AUTH CERT
1. AGUSTIN in the setting of dehydration, combined with COVID-19 infection. baseline serum Cr 0.6-0.8 mg/dl with eGFR=>60 ml/min. Serum Cr OA   4.9-->5.3-->5.5 ->6.0-->4.9>3.5>2.4-->1.3   CT scan reveals no hydro, no calculi. FENa - 0.4% supports pre-renal etiology   Pts wife conveys that pt does not want dialysis   12/27 Patient without IV access and therefore did not receive IVF   12/28 Midline inserted    Continue IVF D5W       2. Hypernatremia in the setting of dehydration. Na+160 OA-->156->155->153>150->152->154   Continue ivf   Monitor Q 12 hrs   12/27 No IVF w/o IV access    12/28 fluids restarted today       3.  DM type II   Controlled as evidenced by HgbA1c 5.3 on 10/25/2020   Primary following       4. Leukocytosis   Wbc 32.2>23>21->23.9   No fever   LLL infiltrates   Received vanc, cefepime, ceftriaxone in ER   Vanc completed   ID following.      Pneumonia - Care Day 6 (12/27/2020) by Carisa Rowley RN       Review Status Review Entered   Completed 12/29/2020 15:35      Criteria Review      Care Day: 6 Care Date: 12/27/2020 Level of Care: Intermediate Care    Guideline Day 2    Level Of Care    (X) Floor    12/29/2020 3:35 PM EST by Franklyn Alvarez      intermediate care unit    Clinical Status    (X) * No CO2 retention or acidosis    (X) * No requirement for mechanical ventilation    (X) * Hypotension absent    12/29/2020 3:35 PM EST by Franklyn Alvarez      112/63    (X) * Afebrile or fever improved    ( ) * No hypoxia on room air or oxygenation improved    12/29/2020 3:35 PM EST by Franklyn Alvarez      still on high flow 9L    (X) * Mental status improved or at baseline    Activity    ( ) * Increased activity    Routes    (X) Usual diet    Interventions    (X) Pulse oximetry    (X) Possible oxygen    12/29/2020 3:35 PM EST by Franklyn Alvarez      9L    Medications    (X) IV or oral antibiotics    12/29/2020 3:35 PM EST by Franklyn Alvarez      merrem 500mg ivpb qd    * Milestone   Additional Notes 12/27  Day 6      MEDS   Merrem 500mg ivpb qd   Heparin 5000 units tid   D5 @ 125 ml/hr      **Pulmonary note**   Slow improvement    Interacting with nods and yes   WBC pending   Assessment:    1. COVID-19 infection,    2. Sepsis,    3. Aphasia, CVA chronic   4. Encephalomalacia   5. HCAP, Left lower lobe pneumonia. 6. hyponatremia,    7. acute kidney injury,    8. Lactic acidosis   Plan:    1. Will stop decadron    2. Still on D5W @ 125 BMP pending   3. His main issue is sepsis not CoVID, on treatment   4. ID consulted   5. On Abx per ID   6. Nephrology correcting volume dehydration   7. Nutritional assessment needed   8. OOB       **ID note**   IMPRESSION:    1. Respiratory failure ,on  13 L of high flow oxygen , saturation 96 %    2. COVID 19 pneumonia ( Severe ) / Pneumonia / Sepsis    3. Leukocytosis, lactic acidosis, elevated procalcitonin    4. Transaminitis    RECOMMENDATIONS:         1. Decadron 6 mg IV q 12 hrs    2. Remdesivir not given due to renal failure    3. Meropenem 500 mg IV q 24 hrs    4. Midline insertion / check CBC with diff    5. Palliative following - transfer to hospice or feeding tube -swallow eval ordered        **Internal MD note**   Plan:   He is on high flow oxygen   Creatinine is 2.4   Hemoglobin is 13.4   leukocytosis slowly improving   Blood cultures negative to date       Per nursing patient has not been eating anything   Check swallow eval   Try Marinol for appetite stimulation   Patient is sure he is not going back to subacute         **Nephro note**   Assessment & Plan:    1. AGUSTIN in the setting of dehydration, combined with COVID-19 infection. baseline serum Cr 0.6-0.8 mg/dl with eGFR=>60 ml/min. Serum Cr OA   4.9-->5.3-->5.5 ->6.0-->4.9>3.5>2.4   CT scan reveals no hydro, no calculi. FENa - 0.4% supports pre-renal etiology   PVR, MCR - 39.8   Pts wife conveys that pt does not want dialysis   Continue IVF D5W       2. Hypernatremia in the setting of dehydration. Na+160 OA-->156->155->153>150   Continue ivf   Monitor Q 12 hrs   Doubt pt getting the fluids that are ordered       3.  DM type II   Controlled as evidenced by HgbA1c 5.3 on 10/25/2020   Primary following       4. Leukocytosis   Wbc 32.2>23>21   No fever   LLL infiltrates   Received vanc, cefepime, ceftriaxone in ER   On Vanc   ID following.      Pneumonia - Care Day 4 (12/25/2020) by Sheryl Guzman RN       Review Status Review Entered   Completed 12/29/2020 15:24      Criteria Review      Care Day: 4 Care Date: 12/25/2020 Level of Care: Intermediate Care    Guideline Day 2    Level Of Care    (X) Floor    12/29/2020 3:24 PM EST by Gómez Harris      intermediate care unit    Clinical Status    (X) * No CO2 retention or acidosis    (X) * No requirement for mechanical ventilation    (X) * Hypotension absent    (X) * Afebrile or fever improved    12/29/2020 3:24 PM EST by Gómez Harris      97.0 temp    ( ) * No hypoxia on room air or oxygenation improved    12/29/2020 3:24 PM EST by Gómez Harris      on high flow oxygen  13L    (X) * Mental status improved or at baseline    12/29/2020 3:24 PM EST by Gómez Harris      Awake, more alert    Activity    ( ) * Increased activity    Routes    (X) Usual diet    12/29/2020 3:24 PM EST by Gómez Harris      cardiac diet    Interventions    (X) Pulse oximetry    (X) Possible oxygen    12/29/2020 3:24 PM EST by Gómez Harris      13L    Medications    (X) IV or oral antibiotics    12/29/2020 3:24 PM EST by Gómez Harris      merrem 500mg ivpb x1    * Milestone   Additional Notes   12/25  Day 4  Intermediate care unit      /76   Pulse 96   Temp 97 °F (36.1 °C) (Temporal)   Resp 16   Ht 6' 2\" (1.88 m)   Wt 185 lb (83.9 kg)   SpO2 92% 13L      MEDS  Maxipime 1gm ivpb qd   Heparin 5000 units tid   Synthroid 50mcg qd   D5 @ 125 ml/hr   Morphine 1mg ivp x1      **Nephro note**   Assessment & Plan: 1. AGUSTIN in the setting of dehydration, combined with COVID-19 infection. baseline serum Cr 0.6-0.8 mg/dl with eGFR=>60 ml/min. Serum Cr OA   4.9-->5.3-->5.5 ->6.0-->4.9>3.5   CT scan reveals no hydro, no calculi. FENa - 0.4% supports pre-renal etiology    PVR, MCR - 39.8   Pts wife conveys that pt does not want dialysis   Continue IVF D5W       2. Hypernatremia in the setting of dehydration. Na+160 OA-->156->155->153>150   Continue ivf   Monitor Q 12 hrs       3. DM type II   Controlled as evidenced by HgbA1c 5.3 on 10/25/2020   Primary following       4. Leukocytosis   Wbc 32.2>23   No fever   LLL infiltrates   Received vanc, cefepime, ceftriaxone in ER   On Vanc   ID following. **ID note**   IMPRESSION:    1. Respiratory failure ,on  13 L of high flow oxygen , saturation 96 %    2. COVID 19 pneumonia ( Severe ) / Pneumonia / Sepsis    3. Leukocytosis, lactic acidosis, elevated procalcitonin    4. Transaminitis        RECOMMENDATIONS:     1. Decadron 6 mg IV q 12 hrs    2. Remdesivir not given due to renal failure    3. Cefepime 1 gram IV q 24 hrs, Vancomycin PRN        **Pulmonary note**   Assessment:    1. COVID-19 infection,    2. Sepsis,    3. Aphasia, CVA chronic   4. Encephalomalacia   5. HCAP, Left lower lobe pneumonia. 6. hyponatremia,    7. acute kidney injury,    8. Lactic acidosis   Plan:    1. Will start to wean decadron    2. Tocilizumab to get given (max dose is 800), give 1/2 and look for response ? If was done   3. His main issue is sepsis not CoVID, on treatment   4. ID consulted   5. On Abx per ID   6.  Nephrology correcting volume dehydration

## 2020-12-29 NOTE — CARE COORDINATION
Discussed with palliative Dr. Yony Sanders. Visited pt in room however, spouse not present. Called spouse daughter Eusebio answered and reports spouse is en route to hospital.  Per Eusebio, they did discuss Hospice and she believes bed will be delivered tomorrow however, states her Mom knows more. Will re-visit once spouse arrives.

## 2020-12-29 NOTE — PROGRESS NOTES
Subjective:    Chief complaint:    Having issues with breathing  Wife is by the bed side    Objective:    BP (!) 86/58   Pulse 127   Temp 96.7 °F (35.9 °C) (Temporal)   Resp (!) 36   Ht 6' 2\" (1.88 m)   Wt 185 lb (83.9 kg)   SpO2 92%   BMI 23.75 kg/m²   General : Awake, more alert  Heart:  RRR, no murmurs, gallops, or rubs. Lungs:  CTA bilaterally, no wheeze, rales or rhonchi  Abd: bowel sounds present, nontender, nondistended, no masses  Extrem:  No clubbing, cyanosis, or edema    CBC:   Lab Results   Component Value Date    WBC 32.1 12/29/2020    RBC 4.93 12/29/2020    HGB 14.7 12/29/2020    HCT 46.4 12/29/2020    MCV 94.1 12/29/2020    MCH 29.8 12/29/2020    MCHC 31.7 12/29/2020    RDW 14.6 12/29/2020     12/29/2020    MPV 12.3 12/29/2020     BMP:    Lab Results   Component Value Date     12/29/2020    K 3.9 12/29/2020     12/29/2020    CO2 21 12/29/2020    BUN 88 12/29/2020    LABALBU 2.8 12/29/2020    LABALBU 4.8 04/27/2012    CREATININE 1.4 12/29/2020    CALCIUM 9.9 12/29/2020    GFRAA >60 12/29/2020    LABGLOM 51 12/29/2020    GLUCOSE 142 12/29/2020    GLUCOSE 112 04/27/2012     PT/INR:    Lab Results   Component Value Date    PROTIME 14.6 12/22/2020    INR 1.3 12/22/2020     Troponin:    Lab Results   Component Value Date    TROPONINI 0.10 12/22/2020       No results for input(s): LABURIN in the last 72 hours. No results for input(s): BC in the last 72 hours. No results for input(s): Dawn Kamara in the last 72 hours.       Current Facility-Administered Medications:     LORazepam (ATIVAN) injection 1 mg, 1 mg, Intravenous, Q4H PRN, Neha Micaelaech, DO, 1 mg at 12/29/20 1145    morphine (PF) injection 2 mg, 2 mg, Intravenous, Q1H PRN, Neha King DO    lidocaine 1 % injection 5 mL, 5 mL, Intradermal, Once, Martin Washington MD    sodium chloride flush 0.9 % injection 10 mL, 10 mL, Intravenous, PRN, Martin Washington MD   heparin flush 100 UNIT/ML injection 100 Units, 1 mL, Intravenous, 2 times per day, Pramod Weber MD, 100 Units at 12/29/20 0925    heparin flush 100 UNIT/ML injection 100 Units, 1 mL, Intracatheter, PRN, Pramod Weber MD    dronabinol (MARINOL) capsule 5 mg, 5 mg, Oral, BID, Pramod Weber MD, Stopped at 12/27/20 2024    dextrose 5 % solution, , Intravenous, Continuous, Mayela Clamp, DO, Last Rate: 125 mL/hr at 12/28/20 1310, New Bag at 12/28/20 1310    heparin flush 100 UNIT/ML injection 100 Units, 100 Units, Intracatheter, PRN, Pramod Weber MD, 100 Units at 12/26/20 2037    mineral oil-hydrophilic petrolatum (HYDROPHOR) ointment, , Topical, BID, Given at 12/29/20 0925 **AND** mineral oil-hydrophilic petrolatum (HYDROPHOR) ointment, , Topical, TID PRN, Pramod Weber MD    sodium chloride flush 0.9 % injection 10 mL, 10 mL, Intravenous, 2 times per day, Pramod Weber MD, 10 mL at 12/28/20 1032    sodium chloride flush 0.9 % injection 10 mL, 10 mL, Intravenous, PRN, Pramod Weber MD    heparin (porcine) injection 5,000 Units, 5,000 Units, Subcutaneous, 3 times per day, Pramod Weber MD, 5,000 Units at 12/29/20 8651    levothyroxine (SYNTHROID) tablet 50 mcg, 50 mcg, Oral, Daily, Pramod Weber MD, Stopped at 12/28/20 0505    aspirin chewable tablet 81 mg, 81 mg, Oral, Daily, Pramod Weber MD, 81 mg at 12/26/20 1038    bisacodyl (DULCOLAX) suppository 10 mg, 10 mg, Rectal, Daily PRN, Pramod Weber MD    magnesium hydroxide (MILK OF MAGNESIA) 400 MG/5ML suspension 30 mL, 30 mL, Oral, Daily PRN, Pramod Weber MD    traMADol (ULTRAM) tablet 50 mg, 50 mg, Oral, Q4H PRN, Pramod Weber MD    sertraline (ZOLOFT) tablet 50 mg, 50 mg, Oral, Daily, Pramod Weber MD, 50 mg at 12/26/20 1038   acetaminophen (TYLENOL) tablet 650 mg, 650 mg, Oral, Q6H PRN **OR** acetaminophen (TYLENOL) suppository 650 mg, 650 mg, Rectal, Q6H PRN, Rosanna Mcghee MD    Vitamin D (CHOLECALCIFEROL) tablet 2,000 Units, 2,000 Units, Oral, Daily, Rosanna Mcghee MD, 2,000 Units at 12/26/20 1038    DIET CARDIAC; Dietary Nutrition Supplements: Diabetic Oral Supplement  Dietary Nutrition Supplements: Other Oral Supplement (see comment)    XR CHEST PORTABLE   Final Result   Left lower lobe pneumonia. CT Head WO Contrast   Final Result   No new abnormal findings. Encephalomalacia on the right as before. CT ABDOMEN PELVIS WO CONTRAST Additional Contrast? None   Final Result   No evidence of urinary tract obstruction or other demonstrable specific cause   of renal failure. See above. CT CHEST WO CONTRAST   Final Result   Left lower lobe pneumonia. Opacity at the right lower lobe is likely   atelectasis. Assessment:    Active Problems:    COVID-19    Altered mental status    Palliative care by specialist    Goals of care, counseling/discussion  Resolved Problems:    * No resolved hospital problems. *  Recent stroke  Underlying aphasia  Acute kidney injury  Hypernatremia  Transaminitis      Plan:    He is on high flow oxygen  Creatinine is 2.4  Hemoglobin is 13.4  leukocytosis slowly improving  Blood cultures negative to date    Ok to dc home whenever wife is willing  Script for roxanol left in case of dc  Alex Tejeda  1:01 PM  12/29/2020    NOTE: This report was transcribed using voice recognition software.  Every effort was made to ensure accuracy; however, inadvertent transcription errors may be present

## 2020-12-29 NOTE — PROGRESS NOTES
Nephrology Consult Note  Patient's Name: Nettie Rogers  3:05 PM  12/29/2020    Nephrologist: Dr. George Pepper    Reason for Consult:  AGUSTIN & Hypernatremia  Requesting Physician:  To Bowser MD    Chief Complaint:  AMS and SOB    History Obtained From:  past medical records    History of Present Ilness:  Per 12/22/20 Note:  Nettie Rogers is a 60 y.o. 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 He had a recent hospitalization 11/30-12/3/2020 for sepsis and severe malnutrition. He had recently had a stroke and was at a rehab, where he suffered a fall with subsequent left shoulder pain and went to ER for evaluation and was found to have a possible uti with sepsis. Urine culture revealed Enterococcus faecalis. During that hospitalization, he pulled out his NG tube, a video swallow was completed, of which he passed. He was sent to the ER this am, 12/22/2020, with a change in mental status and shortness of breath. There was no fever. Pt was dehydrated. Significant labs included:  Wbc 32, Cr 5.3, Na 160, AST 81, , procalcitonin 1.46, and lactic acid 3.2. He was treated with ivf, vanc, ceftriaxone, and cefepime. SARS/ CoV2 +positive . Vitals upon presentation:  T 96.9, R 22, P 120, BP 98/75, 78% on room air, he was placed on heated high flow oxygen 60 L with FiO2 100. He has been nonverbal and non communicating. 12/28: pt not examined due to covid, chart reviewed. Patient discussed with RN. Family has not decided on possible Hospice care. 12/29: Patient to go home on Hospice. with family.       Past Medical History:   Diagnosis Date    Allergic rhinitis     had allergy shots    Cancer ([address]     leukemia    Gout     Thyroid disease        Past Surgical History:   Procedure Laterality Date    BACK SURGERY      [facility]    IR MECHANICAL ART THROMBECTOMY INTRACRANIAL  10/23/2020 IR MECHANICAL ART THROMBECTOMY INTRACRANIAL 10/23/2020 [facility]    TUMOR EXCISION      melanoma on the back       Family History   Problem Relation Age of Onset    Coronary Art Dis Father         aged 63    Breast Cancer Mother     Mult Sclerosis Sister         reports that he has never smoked. He has never used smokeless tobacco. He reports that he does not drink alcohol or use drugs. Allergies:  Bactrim [sulfamethoxazole-trimethoprim]    Current Medications:        LORazepam (ATIVAN) injection 1 mg, Q4H PRN      morphine (PF) injection 2 mg, Q1H PRN      lidocaine 1 % injection 5 mL, Once      sodium chloride flush 0.9 % injection 10 mL, PRN      heparin flush 100 UNIT/ML injection 100 Units, 2 times per day      heparin flush 100 UNIT/ML injection 100 Units, PRN      dronabinol (MARINOL) capsule 5 mg, BID      dextrose 5 % solution, Continuous      heparin flush 100 UNIT/ML injection 100 Units, PRN      mineral oil-hydrophilic petrolatum (HYDROPHOR) ointment, BID    And      mineral oil-hydrophilic petrolatum (HYDROPHOR) ointment, TID PRN      sodium chloride flush 0.9 % injection 10 mL, 2 times per day      sodium chloride flush 0.9 % injection 10 mL, PRN      heparin (porcine) injection 5,000 Units, 3 times per day      levothyroxine (SYNTHROID) tablet 50 mcg, Daily      aspirin chewable tablet 81 mg, Daily      bisacodyl (DULCOLAX) suppository 10 mg, Daily PRN      magnesium hydroxide (MILK OF MAGNESIA) 400 MG/5ML suspension 30 mL, Daily PRN      traMADol (ULTRAM) tablet 50 mg, Q4H PRN      sertraline (ZOLOFT) tablet 50 mg, Daily      acetaminophen (TYLENOL) tablet 650 mg, Q6H PRN    Or      acetaminophen (TYLENOL) suppository 650 mg, Q6H PRN      Vitamin D (CHOLECALCIFEROL) tablet 2,000 Units, Daily        Review of Systems:   Pertinent items are noted in HPI.     Physical exam:   Constitutional: LABALBU 4.8 04/27/2012    CREATININE 1.4 12/29/2020    CALCIUM 9.9 12/29/2020    GFRAA >60 12/29/2020    LABGLOM 51 12/29/2020    GLUCOSE 142 12/29/2020    GLUCOSE 112 04/27/2012     Calcium:    Lab Results   Component Value Date    CALCIUM 9.9 12/29/2020     Ionized Calcium:  No results found for: IONCA  Magnesium:    Lab Results   Component Value Date    MG 1.9 11/30/2020     Phosphorus:    Lab Results   Component Value Date    PHOS 3.9 10/31/2020     LDH:    Lab Results   Component Value Date     12/22/2020     Uric Acid:    Lab Results   Component Value Date    LABURIC 5.4 03/10/2017    URICACID 4.8 07/12/2016     PT/INR:    Lab Results   Component Value Date    PROTIME 14.6 12/22/2020    INR 1.3 12/22/2020     PTT:    Lab Results   Component Value Date    APTT 28.2 12/22/2020   [APTT}  Last 3 Troponin:    Lab Results   Component Value Date    TROPONINI 0.10 12/22/2020    TROPONINI <0.01 11/30/2020     U/A:    Lab Results   Component Value Date    COLORU Yellow 12/22/2020    PROTEINU TRACE 12/22/2020    PHUR 5.0 12/22/2020    WBCUA 1-3 12/22/2020    RBCUA 0-1 12/22/2020    BACTERIA MODERATE 12/22/2020    CLARITYU Clear 12/22/2020    SPECGRAV >=1.030 12/22/2020    LEUKOCYTESUR TRACE 12/22/2020    UROBILINOGEN 1.0 12/22/2020    BILIRUBINUR MODERATE 12/22/2020    BLOODU SMALL 12/22/2020    GLUCOSEU 100 12/22/2020    AMORPHOUS FEW 12/22/2020     ABG:    Lab Results   Component Value Date    PH 7.403 12/22/2020    PCO2 33.5 12/22/2020    PO2 78.4 12/22/2020    HCO3 20.4 12/22/2020    BE -3.3 12/22/2020    O2SAT 95.3 12/22/2020     HgBA1c:    Lab Results   Component Value Date    LABA1C 5.3 10/25/2020     Microalbumen/Creatinine ratio:  No components found for: RUCREAT  FLP:    Lab Results   Component Value Date    TRIG 101 10/25/2020    HDL 48 10/25/2020    LDLCALC 91 10/25/2020    LABVLDL 20 10/25/2020     TSH:    Lab Results   Component Value Date    TSH 0.958 10/29/2020 VITAMIN B12: No components found for: B12  FOLATE:    Lab Results   Component Value Date    FOLATE 19.3 03/16/2015     IRON:  No results found for: IRON  Iron Saturation:  No components found for: PERCENTFE  TIBC:  No results found for: TIBC  FERRITIN:    Lab Results   Component Value Date    FERRITIN 1,489 12/22/2020     RPR:  No results found for: RPR  AMYLASE:  No results found for: AMYLASE  LIPASE:  No results found for: LIPASE  Fibrinogen Level:  No components found for: FIB  Urine Toxicology:  No components found for: IAMMENTA, IBARBIT, IBENZO, ICOCAINE, IMARTHC, IOPIATES, IPHENCYC     Imaging:  EXAMINATION:   CT OF THE HEAD WITHOUT CONTRAST  12/22/2020 3:53 am   TECHNIQUE:   CT of the head was performed without the administration of intravenous   contrast. Dose modulation, iterative reconstruction, and/or weight based   adjustment of the mA/kV was utilized to reduce the radiation dose to as low   as reasonably achievable. COMPARISON:   None. HISTORY:   ORDERING SYSTEM PROVIDED HISTORY: AMS   TECHNOLOGIST PROVIDED HISTORY:   Reason for exam:->AMS   Has a \"code stroke\" or \"stroke alert\" been called? ->No   What reading provider will be dictating this exam?->CRC   FINDINGS:   Stable extensive right-sided encephalomalacia likely with some minimal   dystrophic calcification.  No acute hemorrhage, mass or midline shift. Stable caliber of ventricles and sulci.  Unremarkable bony calvarium.       Impression   No new abnormal findings.  Encephalomalacia on the right as before. EXAMINATION:   CT OF THE CHEST WITHOUT CONTRAST 12/22/2020 3:53 am   TECHNIQUE:   CT of the chest was performed without the administration of intravenous   contrast. Multiplanar reformatted images are provided for review. Dose   modulation, iterative reconstruction, and/or weight based adjustment of the   mA/kV was utilized to reduce the radiation dose to as low as reasonably   achievable.    COMPARISON:   October 7, 2009   HISTORY: ORDERING SYSTEM PROVIDED HISTORY: sob, hypoxia   TECHNOLOGIST PROVIDED HISTORY:   Reason for exam:->sob, hypoxia   What reading provider will be dictating this exam?->CRC   FINDINGS:   There is left lower lobe pneumonia and there is opacity at the right lower   lobe which is most likely atelectasis. Heart size is normal with some coronary artery calcification.  The caliber of   the thoracic aorta is within normal limits.  No mediastinal adenopathy is   present. No active process evident in the upper abdomen.  Chest wall is unremarkable.       Impression   Left lower lobe pneumonia.  Opacity at the right lower lobe is likely   atelectasis. Narrative   EXAMINATION:   CT OF THE ABDOMEN AND PELVIS WITHOUT CONTRAST 12/22/2020 3:53 am   TECHNIQUE:   CT of the abdomen and pelvis was performed without the administration of   intravenous contrast. Multiplanar reformatted images are provided for review. Dose modulation, iterative reconstruction, and/or weight based adjustment of   the mA/kV was utilized to reduce the radiation dose to as low as reasonably   achievable. COMPARISON:   October 7 2009   HISTORY:   ORDERING SYSTEM PROVIDED HISTORY: renal failure, weakness   TECHNOLOGIST PROVIDED HISTORY:   Reason for exam:->renal failure, weakness   Additional Contrast?->None   What reading provider will be dictating this exam?->CRC   FINDINGS:   Lower Chest: Reported separately. Organs: Gallbladder, liver, spleen, pancreas and adrenal glands demonstrate   nothing active. Tamera Scrivener is no evidence of hydronephrosis or renal parenchymal   wasting.  No space-occupying renal lesions or calcified stones are evident. GI/Bowel: Normal appendix. Tamera Scrivener is sigmoid diverticulosis. Pelvis: Nothing active. Peritoneum/Retroperitoneum: Nothing active. Bones/Soft Tissues: Nothing active.       Impression   No evidence of urinary tract obstruction or other demonstrable specific cause   of renal failure.  See above. EXAMINATION:   ONE XRAY VIEW OF THE CHEST   12/22/2020 3:45 am   COMPARISON:   30 November 2020   HISTORY:   ORDERING SYSTEM PROVIDED HISTORY: ams   TECHNOLOGIST PROVIDED HISTORY:   Reason for exam:->ams   What reading provider will be dictating this exam?->CRC   FINDINGS:   Left lower lobe pneumonia.  Heart and pulmonary vascularity are normal.   Neither costophrenic angle is blunted.       Impression   Left lower lobe pneumonia. Assessment & Plan:   1. AGUSTIN in the setting of dehydration, combined with COVID-19 infection. baseline serum Cr 0.6-0.8 mg/dl with eGFR=>60 ml/min. Serum Cr OA   4.9-->5.3-->5.5 ->6.0-->4.9>3.5>2.4-->1.3  CT scan reveals no hydro, no calculi. FENa - 0.4% supports pre-renal etiology  Pts wife conveys that pt does not want dialysis  12/27 Patient without IV access and therefore did not receive IVF  12/28 Midline inserted   Continue IVF D5W    2. Hypernatremia in the setting of dehydration. Na+160 OA-->156->155->153>150->152->154  Continue ivf  Monitor Q 12 hrs  12/27 No IVF w/o IV access   12/28 fluids restarted today    3. DM type II  Controlled as evidenced by HgbA1c 5.3 on 10/25/2020  Primary following    4. Leukocytosis  Wbc 32.2>23>21->23.9  No fever  LLL infiltrates  Received vanc, cefepime, ceftriaxone in ER  Vanc completed  ID following. 5. Altered Mental Status  Primary following     6. COVID 19 Infection  Received steroids  ID following. To be discharged with Hospice. Thank you Dr. Radha Rojas MD for asking us to participate in care of [place] 43  3:15 PM  12/29/2020     Patient seen and examined all key components of the physical performed independently , case discussed with NP, all pertinent labs and radiologic tests personally reviewed agree with above.       Pearlean Sandifer, MD

## 2020-12-29 NOTE — PROGRESS NOTES
Palliative Care Department  [phone]  Palliative Care Progress Note  Lizzie Moreau DO    Brandee Elkins  [id number]  Hospital Day: 8    Date of Initial Consult: 12/23/20  Referring Provider:  Dr. Mindy Leon was consulted for assistance with:goals of care and code status discussion      HPI:   Brandee Elkins is a 60 y.o. with a past medical history of gout, leukemia, HLD,  thyroid disease, intracranial thrombectomy (CVA-10/23/20) who was admitted on 12/22/2020 from local nursing facility with a CHIEF COMPLAINT of altered mental status for ~ 2 days and shortness of breath. EMS found pt to be hypoxic; pt was placed on non rebreather mask. Workup in ED noting left LL pneumonia; leukocytosis of 32.3; hyponatremia; AGUSTIN; UTI and +for COVID 19. CT of head noted no new findings. He was started on IV fluids,  antibiotics and decadron. He was placed on Optiflow. He was admitted for further care. ASSESSMENT/PLAN:     Pertinent Hospital Diagnoses   ? COVID 19 pneumonia/resp failure with hypoxia-continue oxygen supplementation as needed; dexamethasone; ID consulted; continues on antibiotics  ? Septicemia continue antibiotics  ? AMS- continue to evaluate neuro sats  ? AGUSTIN- Nephrology consulted; continue fluids  ? Hypernatremia- continue fluids; monitor Na+    Palliative Care Encounter / Counseling Regarding Goals of Care    ? Brandee Elkins, Does Not have capacity for medical decision-making. Capacity is time limited and situation/question specific  ? During encounter wife Dung Muse was surrogate medical decision-maker  ? Outcome of goals of care meeting: wife requesting patient go home with hospice today  ? Code status ;  limited code- no to everything  ? Advanced Directives: no HPOA or Living Will noted in chart  ?  Surrogate/Legal NOK:  o spouse Rosemary Huggins @ [phone]    Spiritual assessment: no spiritual distress identified  Bereavement and grief: to be determined Referrals to: none today    SUBJECTIVE:     Active Hospital Problems    Diagnosis Date Noted    Altered mental status [R41.82]     Palliative care by specialist [Z51.5]     Goals of care, counseling/discussion [Z71.89]     COVID-19 [U07.1] 12/22/2020     Details of Conversation:    Carmen Rodriguez spoke with wife yesterday, she wanted to talk with family prior to agreeing to take him home. Her granddaughter was staying with them until tomorrow, but due to patient doing worse today wife now is requesting that patient go home today. Met with wife, wife tearful, states she has a hard time watching the patient struggle to breathe and just wants him to be comfortable. Spoke with bedside nurse regarding plan to get patient more comfortable. Also need to wean down to 10L O2 before can go home. OBJECTIVE:   Prognosis: Guarded    Physical Exam:  BP (!) 89/55   Pulse 125   Temp 96.9 °F (36.1 °C) (Temporal)   Resp 21   Ht 6' 2\" (1.88 m)   Wt 185 lb (83.9 kg)   SpO2 90%   BMI 23.75 kg/m²   Physical exam not performed due to COVID-19 pandemic and need to preserve PPE. Please see note from primary team for exam details. Objective data reviewed: labs, images, records, medication use, vitals and chart    Discussed patient and the plan of care with the other IDT members: Palliative Medicine IDT Team    Time/Communication  Greater than 50% of time spent, total 35 minutes in counseling and coordination of care at the bedside regarding goals of care, symptom management, diagnosis and prognosis and see above. Thank you for allowing Palliative Medicine to participate in the care of Sharron Stokes.

## 2020-12-29 NOTE — PROGRESS NOTES
Call placed to wife, Serge Chambers. She would like to take patient home today. Patient has declined since yesterday. Explained to wife that patient will need to wean down on the oxygen prior to discharging home. Explained that patient will be medicated for comfort and that he may pass while weaning his oxygen down. Serge Chambers verbalized understanding. Will have [id number] [facility] deliver DME today between 2-5pm. Will arrange for transportation once DME arrives in the home. Morphine script left in soft chart. Spoke with [facility]. Reviewed medication. Morphine sulfate in hospice pay. Will send script to out patient pharmacy to be filled. Arranged for [facility] to  at 1730. Faxed face sheet and ambulance form to [facility]. Updated CM/SW and bedside RN.

## 2021-01-11 NOTE — DISCHARGE SUMMARY
Physician Discharge Summary     Patient ID:  Nettie Rogers  [id number]  57 y.o.  1955    Admit date: 2020    Discharge date and time: 2020  6:49 PM     Admission Diagnoses: Active Problems:    COVID-19    Altered mental status    Palliative care by specialist    Goals of care, counseling/discussion  Resolved Problems:    * No resolved hospital problems. *      Discharge Diagnoses: Active Problems:    COVID-19    Altered mental status    Palliative care by specialist    Goals of care, counseling/discussion  Resolved Problems:    * No resolved hospital problems. *      Condition at discharge : Unstable and patient expected to  shortly    Consults: IP CONSULT TO PULMONOLOGY  IP CONSULT TO NEPHROLOGY  PHARMACY TO DOSE VANCOMYCIN  IP CONSULT TO INFECTIOUS DISEASES  IP CONSULT TO PHARMACY  IP CONSULT TO PALLIATIVE CARE  IP CONSULT TO HOSPICE    Procedures: None    Hospital Course: 52-year-old gentleman from a local nursing home presents to the emergency room with altered mental status. He was also complaining of shortness of breath. He was noted to have acute kidney injury and lactic acidosis. CT angiogram of the chest revealed left lower lobe infiltrate. He was Covid positive. He was admitted to a monitored floor. He also was also noted to have transaminitis. He also had hyponatremia. Empiric antibiotics were started because of elevated procalcitonin and suspicion for HCAP. He was on oxygen supplementation. Dexamethasone was started. He was not a candidate for remdesivir given acute kidney disease. Nephrology and infectious disease were consulted. Patient really did not improve with conservative therapy. Patient and patient's wife then subsequently requested hospice care. Patient was then discharged home with hospice. XR CHEST PORTABLE   Final Result   Left lower lobe pneumonia.       CT Head WO Contrast   Final Result No new abnormal findings. Encephalomalacia on the right as before. CT ABDOMEN PELVIS WO CONTRAST Additional Contrast? None   Final Result   No evidence of urinary tract obstruction or other demonstrable specific cause   of renal failure. See above. CT CHEST WO CONTRAST   Final Result   Left lower lobe pneumonia. Opacity at the right lower lobe is likely   atelectasis.           Results for orders placed or performed during the hospital encounter of 12/22/20 (from the past 336 hour(s))   Comprehensive Metabolic Panel w/ Reflex to MG    Collection Time: 12/28/20 10:40 AM   Result Value Ref Range    Sodium 154 (H) 132 - 146 mmol/L    Potassium reflex Magnesium 3.9 3.5 - 5.0 mmol/L    Chloride 117 (H) 98 - 107 mmol/L    CO2 25 22 - 29 mmol/L    Anion Gap 12 7 - 16 mmol/L    Glucose 110 (H) 74 - 99 mg/dL    BUN 80 (H) 8 - 23 mg/dL    CREATININE 1.3 (H) 0.7 - 1.2 mg/dL    GFR Non-African American 56 >=60 mL/min/1.73    GFR African American >60     Calcium 9.7 8.6 - 10.2 mg/dL    Total Protein 6.2 (L) 6.4 - 8.3 g/dL    Alb 2.9 (L) 3.5 - 5.2 g/dL    Total Bilirubin 1.0 0.0 - 1.2 mg/dL    Alkaline Phosphatase 340 (H) 40 - 129 U/L     (H) 0 - 40 U/L    AST 67 (H) 0 - 39 U/L   CBC Auto Differential    Collection Time: 12/28/20 10:40 AM   Result Value Ref Range    WBC 23.9 (H) 4.5 - 11.5 E9/L    RBC 4.87 3.80 - 5.80 E12/L    Hemoglobin 14.3 12.5 - 16.5 g/dL    Hematocrit 46.3 37.0 - 54.0 %    MCV 95.1 80.0 - 99.9 fL    MCH 29.4 26.0 - 35.0 pg    MCHC 30.9 (L) 32.0 - 34.5 %    RDW 14.1 11.5 - 15.0 fL    Platelets 833 966 - 534 E9/L    MPV 12.1 (H) 7.0 - 12.0 fL    Neutrophils % 88.8 (H) 43.0 - 80.0 %    Immature Granulocytes % 0.8 0.0 - 5.0 %    Lymphocytes % 3.6 (L) 20.0 - 42.0 %    Monocytes % 6.0 2.0 - 12.0 %    Eosinophils % 0.5 0.0 - 6.0 %    Basophils % 0.3 0.0 - 2.0 %    Neutrophils Absolute 21.23 (H) 1.80 - 7.30 E9/L    Immature Granulocytes # 0.20 E9/L Lymphocytes Absolute 0.85 (L) 1.50 - 4.00 E9/L    Monocytes Absolute 1.44 (H) 0.10 - 0.95 E9/L    Eosinophils Absolute 0.11 0.05 - 0.50 E9/L    Basophils Absolute 0.08 0.00 - 0.20 E9/L    RBC Morphology Normal    Fibrinogen    Collection Time: 12/28/20 10:40 AM   Result Value Ref Range    Fibrinogen >700 (H) 225 - 540 mg/dL   D-Dimer, Quantitative    Collection Time: 12/28/20 10:40 AM   Result Value Ref Range    D-Dimer, Quant 2177 ng/mL DDU   Comprehensive Metabolic Panel w/ Reflex to MG    Collection Time: 12/29/20  5:53 AM   Result Value Ref Range    Sodium 156 (H) 132 - 146 mmol/L    Potassium reflex Magnesium 3.9 3.5 - 5.0 mmol/L    Chloride 119 (H) 98 - 107 mmol/L    CO2 21 (L) 22 - 29 mmol/L    Anion Gap 16 7 - 16 mmol/L    Glucose 142 (H) 74 - 99 mg/dL    BUN 88 (H) 8 - 23 mg/dL    CREATININE 1.4 (H) 0.7 - 1.2 mg/dL    GFR Non-African American 51 >=60 mL/min/1.73    GFR African American >60     Calcium 9.9 8.6 - 10.2 mg/dL    Total Protein 6.1 (L) 6.4 - 8.3 g/dL    Alb 2.8 (L) 3.5 - 5.2 g/dL    Total Bilirubin 1.0 0.0 - 1.2 mg/dL    Alkaline Phosphatase 332 (H) 40 - 129 U/L    ALT 94 (H) 0 - 40 U/L    AST 43 (H) 0 - 39 U/L   CBC Auto Differential    Collection Time: 12/29/20  5:53 AM   Result Value Ref Range    WBC 32.1 (H) 4.5 - 11.5 E9/L    RBC 4.93 3.80 - 5.80 E12/L    Hemoglobin 14.7 12.5 - 16.5 g/dL    Hematocrit 46.4 37.0 - 54.0 %    MCV 94.1 80.0 - 99.9 fL    MCH 29.8 26.0 - 35.0 pg    MCHC 31.7 (L) 32.0 - 34.5 %    RDW 14.6 11.5 - 15.0 fL    Platelets 759 552 - 454 E9/L    MPV 12.3 (H) 7.0 - 12.0 fL    Neutrophils % 94.8 (H) 43.0 - 80.0 %    Lymphocytes % 1.7 (L) 20.0 - 42.0 %    Monocytes % 3.5 2.0 - 12.0 %    Eosinophils % 0.2 0.0 - 6.0 %    Basophils % 0.2 0.0 - 2.0 %    Neutrophils Absolute 30.50 (H) 1.80 - 7.30 E9/L    Lymphocytes Absolute 0.64 (L) 1.50 - 4.00 E9/L    Monocytes Absolute 1.28 (H) 0.10 - 0.95 E9/L    Eosinophils Absolute 0.00 (L) 0.05 - 0.50 E9/L Basophils Absolute 0.00 0.00 - 0.20 E9/L    Anisocytosis 1+     Polychromasia 1+     Poikilocytes 1+     Chamois Cells 1+     Ovalocytes 1+    Fibrinogen    Collection Time: 12/29/20  5:53 AM   Result Value Ref Range    Fibrinogen >700 (H) 225 - 540 mg/dL   D-Dimer, Quantitative    Collection Time: 12/29/20  5:53 AM   Result Value Ref Range    D-Dimer, Quant 1557 ng/mL DDU         Discharge Exam:  See progress note from today    Disposition: Home with hospice    Patient Instructions:   Discharge Medication List as of 12/29/2020 12:05 PM      START taking these medications    Details   morphine sulfate 20 MG/ML concentrated oral solution Take 0.5 mLs by mouth every 2 hours as needed for Pain for up to 3 days. , Disp-30 mL, R-0Print         STOP taking these medications       acetaminophen (TYLENOL) 325 MG tablet Comments:   Reason for Stopping:         mirtazapine (REMERON) 30 MG tablet Comments:   Reason for Stopping:         aspirin 81 MG chewable tablet Comments:   Reason for Stopping:         bisacodyl (DULCOLAX) 10 MG suppository Comments:   Reason for Stopping:         magnesium hydroxide (MILK OF MAGNESIA) 400 MG/5ML suspension Comments:   Reason for Stopping:         traMADol (ULTRAM) 50 MG tablet Comments:   Reason for Stopping:         sertraline (ZOLOFT) 50 MG tablet Comments:   Reason for Stopping:         Febuxostat 80 MG TABS Comments:   Reason for Stopping:         levothyroxine (SYNTHROID) 50 MCG tablet Comments:   Reason for Stopping:         valACYclovir (VALTREX) 1 g tablet Comments:   Reason for Stopping:               Activity: As tolerated    Diet: As tolerated    Follow-up with No follow-up provider specified.       Note that over 30 minutes was spent in preparing discharge papers, discussing discharge with patient, medication review, etc.    Signed:  Jerrell Puentes  1/10/2021  7:41 PM

## 2022-02-18 NOTE — PROGRESS NOTES
Febuxostat 80 mg is non-formulary and will not be dispensed by the pharmacy at this time. Please have patient bring in home supply of medication and deliver it to pharmacy for identification and barcode. If patient has not supplied medication by 1400 on 12/23/20, please notify pharmacy.  #    Doe Márquez, Roque 12/22/2020 7:10 PM Tim 45 Transitions Follow Up Call    2022    Patient: Kenneth Palmer  Patient : 1952   MRN: [id number]   Reason for Admission:  covid 19 monitoring   Discharge Date: 22 RARS: Readmission Risk Score: 11.5 ( )         Spoke with: Tasha 17 Transitions Subsequent and Final Call    Subsequent and Final Calls  Do you have any ongoing symptoms?: No  Have your medications changed?: No  Do you have any questions related to your medications?: No  Do you currently have any active services?: No  Do you have any needs or concerns that I can assist you with?: No  Identified Barriers: None  Care Transitions Interventions  Other Interventions:         SUMMARY  CTN spoke to the Pt who denies s/s r/t Covid 19. Pt reports having elevated BP and HR the other day and having an EKG. Pt states EKG was fine and he's not sure what called elevated BP / HR but has not had an issue since then. Pt states he has not checked BP/HR this morning but feels fine and having no issues. Pt denies issues with fluid intake, appetite, urination, and bowel habits. Pt confirms they have all meds and taking as prescribed. From CDC: Are you at higher risk for severe illness?  Wash your hands often.  Avoid close contact (6 feet, which is about two arm lengths) with people who are sick.  Put distance between yourself and other people if COVID-19 is spreading in your community.  Clean and disinfect frequently touched surfaces.  Avoid all cruise travel and non-essential air travel.  Call your healthcare professional if you have concerns about COVID-19 and your underlying condition or if you are sick. Pt will take all meds as prescribed and schedule / keep doctor's appt. [facility] provided education on s/s that require medical attention and when to seek medical attention. [facility] advised Pt of use urgent care or physician's 24 hr access line if assistance is needed after hours or on the weekend.   Pt denies any needs or concerns and is agreeable with additional calls.     Follow Up  Future Appointments   Date Time Provider Carlos Velazquez   3/3/2022 11:00 AM SCHEDULE, [facility]  [facility] Kianna RAI   3/10/2022  1:20 PM MD Reji Tolliver P/CC WILLA Putnam RN

## 2022-06-09 NOTE — PROGRESS NOTES
Palliative Care Department  [phone]  Palliative Care Progress Note  Crissy Bains  [id number]  Hospital Day: 6    Date of Initial Consult: 12/23/20  Referring Provider:  Dr. Tiffanie Love was consulted for assistance with:goals of care and code status discussion      HPI:   Raleigh Madrigal is a 60 y.o. with a past medical history of gout, leukemia, HLD,  thyroid disease, intracranial thrombectomy (CVA-10/23/20) who was admitted on 12/22/2020 from local nursing facility with a CHIEF COMPLAINT of altered mental status for ~ 2 days and shortness of breath. EMS found pt to be hypoxic; pt was placed on non rebreather mask. Workup in ED noting left LL pneumonia; leukocytosis of 32.3; hyponatremia; AGUSTIN; UTI and +for COVID 19. CT of head noted no new findings. He was started on IV fluids,  antibiotics and decadron. He was placed on Optiflow. He was admitted for further care. ASSESSMENT/PLAN:     Pertinent Hospital Diagnoses   ? COVID 19 pneumonia/resp failure with hypoxia-continue oxygen supplementation as needed; dexamethasone; ID consulted; continues on antibiotics  ? Septicemia continue antibiotics  ? AMS- continue to evaluate neuro sats  ? AGUSTIN- Nephrology consulted; continue fluids  ? Hypernatremia- continue fluids; monitor Na+    Palliative Care Encounter / Counseling Regarding Goals of Care    ? Raleigh Madrigal, Does Not have capacity for medical decision-making. Capacity is time limited and situation/question specific  ? During encounter wife Harley Sweeney was surrogate medical decision-maker  ? Outcome of goals of care meeting: Continue current care; treat infections; nutrition-does not want feeding tube; ok for \"pleasure feedings\". ? Code status ;  limited code- no to everything  ? Advanced Directives: no HPOA or Living Will noted in chart  ?  Surrogate/Legal NOK:  o spouse Brown Goodell @ [phone]    Spiritual assessment: no spiritual distress identified Bereavement and grief: to be determined  Referrals to: none today    SUBJECTIVE:     Active Hospital Problems    Diagnosis Date Noted    Altered mental status [R41.82]     Palliative care by specialist [Z51.5]     Goals of care, counseling/discussion [Z71.89]     COVID-19 [U07.1] 12/22/2020     Details of Conversation:    Per bedside RN no improvement in condition. Daughter was able to visit today. Family still have not made a decision regarding whether to proceed with hospice. No prn pain medication given. OBJECTIVE:   Prognosis: Guarded    Physical Exam:  /71   Pulse 102   Temp 97.3 °F (36.3 °C) (Temporal)   Resp 18   Ht 6' 2\" (1.88 m)   Wt 185 lb (83.9 kg)   SpO2 97%   BMI 23.75 kg/m²   Physical exam not performed due to COVID-19 pandemic and need to preserve PPE. Please see note from primary team for exam details. Objective data reviewed: labs, images, records, medication use, vitals and chart    Discussed patient and the plan of care with the other IDT members: Palliative Medicine IDT Team    Time/Communication  Greater than 50% of time spent, total 15 minutes in counseling and coordination of care at the bedside regarding goals of care, symptom management, diagnosis and prognosis and see above. Thank you for allowing Palliative Medicine to participate in the care of Janice Rowe. Alert-The patient is alert, awake and responds to voice. The patient is oriented to time, place, and person. The triage nurse is able to obtain subjective information.